# Patient Record
Sex: FEMALE | Race: WHITE | Employment: UNEMPLOYED | URBAN - METROPOLITAN AREA
[De-identification: names, ages, dates, MRNs, and addresses within clinical notes are randomized per-mention and may not be internally consistent; named-entity substitution may affect disease eponyms.]

---

## 2017-03-14 ENCOUNTER — HOSPITAL ENCOUNTER (OUTPATIENT)
Age: 42
Discharge: HOME | End: 2017-03-14
Payer: COMMERCIAL

## 2017-03-14 VITALS
OXYGEN SATURATION: 100 % | HEART RATE: 72 BPM | HEIGHT: 68 IN | RESPIRATION RATE: 16 BRPM | DIASTOLIC BLOOD PRESSURE: 75 MMHG | SYSTOLIC BLOOD PRESSURE: 144 MMHG | TEMPERATURE: 98 F

## 2017-03-14 DIAGNOSIS — L02.91 ABSCESS: Primary | ICD-10-CM

## 2017-03-14 PROCEDURE — 87070 CULTURE OTHR SPECIMN AEROBIC: CPT | Performed by: PHYSICIAN ASSISTANT

## 2017-03-14 PROCEDURE — 99203 OFFICE O/P NEW LOW 30 MIN: CPT

## 2017-03-14 PROCEDURE — 87205 SMEAR GRAM STAIN: CPT | Performed by: PHYSICIAN ASSISTANT

## 2017-03-14 PROCEDURE — 99204 OFFICE O/P NEW MOD 45 MIN: CPT

## 2017-03-14 RX ORDER — SULFAMETHOXAZOLE AND TRIMETHOPRIM 800; 160 MG/1; MG/1
1 TABLET ORAL 2 TIMES DAILY
Qty: 14 TABLET | Refills: 0 | Status: SHIPPED | OUTPATIENT
Start: 2017-03-14 | End: 2017-03-21

## 2017-03-14 RX ORDER — DOXEPIN HYDROCHLORIDE 50 MG/1
1 CAPSULE ORAL DAILY
COMMUNITY
End: 2021-04-05

## 2017-03-14 NOTE — ED PROVIDER NOTES
Patient Seen in: [facility] In [place]    History   Patient presents with:  Abscess (integumentary)    Stated Complaint: abscess on hip    HPI    Kary Hawkins is a 59-year-old female who presents today for evaluation of an abscess on her hip.   Liane Martin Exam   Constitutional: She is oriented to person, place, and time. She appears well-developed and well-nourished. HENT:   Head: Normocephalic and atraumatic.    Right Ear: External ear normal.   Left Ear: External ear normal.   Nose: Nose normal.   Mouth/ Medication List as of 3/14/2017  5:26 PM    START taking these medications    Sulfamethoxazole-TMP -160 MG Oral Tab per tablet  Take 1 tablet by mouth 2 (two) times daily. , Normal, Disp-14 tablet, R-0

## 2017-03-14 NOTE — ED INITIAL ASSESSMENT (HPI)
Patient presents to [facility] with cc of abscess to left hip x 4 days. No fever or chills. Popped it herself on Saturday and draining purulent drainage.

## 2017-03-28 ENCOUNTER — HOSPITAL ENCOUNTER (OUTPATIENT)
Age: 42
Discharge: HOME | End: 2017-03-28
Payer: COMMERCIAL

## 2017-03-28 ENCOUNTER — APPOINTMENT (OUTPATIENT)
Dept: GENERAL RADIOLOGY | Age: 42
End: 2017-03-28
Attending: PHYSICIAN ASSISTANT
Payer: COMMERCIAL

## 2017-03-28 VITALS
SYSTOLIC BLOOD PRESSURE: 122 MMHG | HEART RATE: 66 BPM | TEMPERATURE: 99 F | RESPIRATION RATE: 20 BRPM | OXYGEN SATURATION: 99 % | DIASTOLIC BLOOD PRESSURE: 53 MMHG

## 2017-03-28 DIAGNOSIS — M41.9 SCOLIOSIS OF THORACIC SPINE, UNSPECIFIED SCOLIOSIS TYPE: ICD-10-CM

## 2017-03-28 DIAGNOSIS — M50.322 OTHER CERVICAL DISC DEGENERATION AT C5-C6 LEVEL: Primary | ICD-10-CM

## 2017-03-28 DIAGNOSIS — S13.9XXA ACUTE CERVICAL SPRAIN, INITIAL ENCOUNTER: ICD-10-CM

## 2017-03-28 LAB — POCT URINE PREGNANCY: NEGATIVE

## 2017-03-28 PROCEDURE — 72050 X-RAY EXAM NECK SPINE 4/5VWS: CPT

## 2017-03-28 PROCEDURE — 99214 OFFICE O/P EST MOD 30 MIN: CPT

## 2017-03-28 PROCEDURE — 81025 URINE PREGNANCY TEST: CPT | Performed by: PHYSICIAN ASSISTANT

## 2017-03-28 PROCEDURE — 96372 THER/PROPH/DIAG INJ SC/IM: CPT

## 2017-03-28 RX ORDER — KETOROLAC TROMETHAMINE 30 MG/ML
60 INJECTION, SOLUTION INTRAMUSCULAR; INTRAVENOUS ONCE
Status: COMPLETED | OUTPATIENT
Start: 2017-03-28 | End: 2017-03-28

## 2017-03-28 RX ORDER — CYCLOBENZAPRINE HCL 10 MG
10 TABLET ORAL NIGHTLY
Qty: 20 TABLET | Refills: 0 | Status: SHIPPED | OUTPATIENT
Start: 2017-03-28 | End: 2017-04-17
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
## 2017-04-26 ENCOUNTER — HOSPITAL ENCOUNTER (OUTPATIENT)
Dept: GENERAL RADIOLOGY | Facility: HOSPITAL | Age: 42
Discharge: HOME | End: 2017-04-26
Attending: FAMILY MEDICINE
Payer: COMMERCIAL

## 2017-04-26 ENCOUNTER — LAB ENCOUNTER (OUTPATIENT)
Dept: LAB | Facility: HOSPITAL | Age: 42
End: 2017-04-26
Attending: FAMILY MEDICINE
Payer: COMMERCIAL

## 2017-04-26 DIAGNOSIS — R10.11 RIGHT UPPER QUADRANT PAIN: ICD-10-CM

## 2017-04-26 DIAGNOSIS — R07.81 PLEURITIC PAIN: ICD-10-CM

## 2017-04-26 DIAGNOSIS — R07.1 CHEST PAIN MADE WORSE BY BREATHING: ICD-10-CM

## 2017-04-26 DIAGNOSIS — R10.11 RIGHT UPPER QUADRANT PAIN: Primary | ICD-10-CM

## 2017-04-26 DIAGNOSIS — R07.9 CHEST PAIN, UNSPECIFIED TYPE: ICD-10-CM

## 2017-04-26 DIAGNOSIS — R07.9 CHEST PAIN: ICD-10-CM

## 2017-04-26 PROCEDURE — 84484 ASSAY OF TROPONIN QUANT: CPT

## 2017-04-26 PROCEDURE — 85378 FIBRIN DEGRADE SEMIQUANT: CPT

## 2017-04-26 PROCEDURE — 36415 COLL VENOUS BLD VENIPUNCTURE: CPT

## 2017-04-26 PROCEDURE — 71020 XR CHEST PA + LAT CHEST (CPT=71020): CPT

## 2019-09-26 ENCOUNTER — APPOINTMENT (OUTPATIENT)
Dept: GENERAL RADIOLOGY | Facility: HOSPITAL | Age: 44
End: 2019-09-26
Attending: PHYSICIAN ASSISTANT
Payer: COMMERCIAL

## 2019-09-26 ENCOUNTER — HOSPITAL ENCOUNTER (EMERGENCY)
Facility: HOSPITAL | Age: 44
Discharge: HOME | End: 2019-09-26
Attending: EMERGENCY MEDICINE
Payer: COMMERCIAL

## 2019-09-26 VITALS
SYSTOLIC BLOOD PRESSURE: 122 MMHG | TEMPERATURE: 99 F | HEART RATE: 71 BPM | RESPIRATION RATE: 17 BRPM | OXYGEN SATURATION: 100 % | DIASTOLIC BLOOD PRESSURE: 59 MMHG

## 2019-09-26 DIAGNOSIS — R07.89 PRESSURE IN CHEST: Primary | ICD-10-CM

## 2019-09-26 LAB
ALBUMIN SERPL-MCNC: 4.1 G/DL (ref 3.4–5)
ALBUMIN/GLOB SERPL: 1 {RATIO} (ref 1–2)
ALP LIVER SERPL-CCNC: 87 U/L (ref 37–98)
ALT SERPL-CCNC: 18 U/L (ref 13–56)
ANION GAP SERPL CALC-SCNC: 5 MMOL/L (ref 0–18)
AST SERPL-CCNC: 10 U/L (ref 15–37)
BASOPHILS # BLD AUTO: 0.03 X10(3) UL (ref 0–0.2)
BASOPHILS NFR BLD AUTO: 0.2 %
BILIRUB SERPL-MCNC: 0.9 MG/DL (ref 0.1–2)
BUN BLD-MCNC: 12 MG/DL (ref 7–18)
BUN/CREAT SERPL: 15.2 (ref 10–20)
CALCIUM BLD-MCNC: 9.1 MG/DL (ref 8.5–10.1)
CHLORIDE SERPL-SCNC: 103 MMOL/L (ref 98–112)
CO2 SERPL-SCNC: 27 MMOL/L (ref 21–32)
CREAT BLD-MCNC: 0.79 MG/DL (ref 0.55–1.02)
DEPRECATED RDW RBC AUTO: 44.8 FL (ref 35.1–46.3)
EOSINOPHIL # BLD AUTO: 0.06 X10(3) UL (ref 0–0.7)
EOSINOPHIL NFR BLD AUTO: 0.4 %
ERYTHROCYTE [DISTWIDTH] IN BLOOD BY AUTOMATED COUNT: 14 % (ref 11–15)
GLOBULIN PLAS-MCNC: 4.2 G/DL (ref 2.8–4.4)
GLUCOSE BLD-MCNC: 101 MG/DL (ref 70–99)
HCT VFR BLD AUTO: 44.2 % (ref 35–48)
HGB BLD-MCNC: 14.5 G/DL (ref 12–16)
IMM GRANULOCYTES # BLD AUTO: 0.05 X10(3) UL (ref 0–1)
IMM GRANULOCYTES NFR BLD: 0.3 %
LIPASE SERPL-CCNC: 124 U/L (ref 73–393)
LYMPHOCYTES # BLD AUTO: 2.12 X10(3) UL (ref 1–4)
LYMPHOCYTES NFR BLD AUTO: 14.6 %
M PROTEIN MFR SERPL ELPH: 8.3 G/DL (ref 6.4–8.2)
MCH RBC QN AUTO: 28.5 PG (ref 26–34)
MCHC RBC AUTO-ENTMCNC: 32.8 G/DL (ref 31–37)
MCV RBC AUTO: 87 FL (ref 80–100)
MONOCYTES # BLD AUTO: 1.18 X10(3) UL (ref 0.1–1)
MONOCYTES NFR BLD AUTO: 8.1 %
NEUTROPHILS # BLD AUTO: 11.09 X10 (3) UL (ref 1.5–7.7)
NEUTROPHILS # BLD AUTO: 11.09 X10(3) UL (ref 1.5–7.7)
NEUTROPHILS NFR BLD AUTO: 76.4 %
OSMOLALITY SERPL CALC.SUM OF ELEC: 280 MOSM/KG (ref 275–295)
PLATELET # BLD AUTO: 230 10(3)UL (ref 150–450)
POTASSIUM SERPL-SCNC: 3.5 MMOL/L (ref 3.5–5.1)
RBC # BLD AUTO: 5.08 X10(6)UL (ref 3.8–5.3)
SODIUM SERPL-SCNC: 135 MMOL/L (ref 136–145)
TROPONIN I SERPL-MCNC: <0.045 NG/ML (ref ?–0.04)
WBC # BLD AUTO: 14.5 X10(3) UL (ref 4–11)

## 2019-09-26 PROCEDURE — 93010 ELECTROCARDIOGRAM REPORT: CPT

## 2019-09-26 PROCEDURE — 99285 EMERGENCY DEPT VISIT HI MDM: CPT

## 2019-09-26 PROCEDURE — 71045 X-RAY EXAM CHEST 1 VIEW: CPT | Performed by: PHYSICIAN ASSISTANT

## 2019-09-26 PROCEDURE — 93005 ELECTROCARDIOGRAM TRACING: CPT

## 2019-09-26 PROCEDURE — 85025 COMPLETE CBC W/AUTO DIFF WBC: CPT | Performed by: PHYSICIAN ASSISTANT

## 2019-09-26 PROCEDURE — 84484 ASSAY OF TROPONIN QUANT: CPT | Performed by: PHYSICIAN ASSISTANT

## 2019-09-26 PROCEDURE — 96374 THER/PROPH/DIAG INJ IV PUSH: CPT

## 2019-09-26 PROCEDURE — 80053 COMPREHEN METABOLIC PANEL: CPT | Performed by: PHYSICIAN ASSISTANT

## 2019-09-26 PROCEDURE — 83690 ASSAY OF LIPASE: CPT | Performed by: PHYSICIAN ASSISTANT

## 2019-09-26 RX ORDER — SUCRALFATE ORAL 1 G/10ML
1 SUSPENSION ORAL
Status: DISCONTINUED | OUTPATIENT
Start: 2019-09-26 | End: 2019-09-26

## 2019-09-26 RX ORDER — OMEPRAZOLE 20 MG/1
20 CAPSULE, DELAYED RELEASE ORAL DAILY
Qty: 30 CAPSULE | Refills: 0 | Status: SHIPPED | OUTPATIENT
Start: 2019-09-26 | End: 2019-10-26

## 2019-09-26 RX ORDER — SUCRALFATE ORAL 1 G/10ML
1 SUSPENSION ORAL ONCE
Status: COMPLETED | OUTPATIENT
Start: 2019-09-26 | End: 2019-09-26

## 2019-09-26 RX ORDER — KETOROLAC TROMETHAMINE 30 MG/ML
15 INJECTION, SOLUTION INTRAMUSCULAR; INTRAVENOUS ONCE
Status: COMPLETED | OUTPATIENT
Start: 2019-09-26 | End: 2019-09-26

## 2019-09-27 LAB
ATRIAL RATE: 77 BPM
P AXIS: 57 DEGREES
P-R INTERVAL: 172 MS
Q-T INTERVAL: 366 MS
QRS DURATION: 106 MS
QTC CALCULATION (BEZET): 414 MS
R AXIS: 85 DEGREES
T AXIS: -1 DEGREES
VENTRICULAR RATE: 77 BPM

## 2019-09-27 NOTE — ED PROVIDER NOTES
Patient Seen in: [facility] Emergency Department      History   Patient presents with:  Chest Pain Angina (cardiovascular)    Stated Complaint: Chest pain    HPI  CHIEF COMPLAINT: Chest pressure    HISTORY OF PRESENT ILLNESS: Patient is a 44-year-ol noncontributory to the presenting problem, except as indicated as above.       Past Medical History:   Diagnosis Date   • Asthma               Past Surgical History:   Procedure Laterality Date   •       x5   • REMOVAL GALLBLADDER                      S (*)     All other components within normal limits   CBC W/ DIFFERENTIAL - Abnormal; Notable for the following components:    WBC 14.5 (*)     Neutrophil Absolute Prelim 11.09 (*)     Neutrophil Absolute 11.09 (*)     Monocyte Absolute 1.18 (*)     All othe performed at this time. MDM     2215: Patient is comfortable going home , patient had negative cardiac work-up, patient be discharged home on Prilosec close follow-up with her primary care physician.   Discussed with her that she may need a outpatient st 0                            EpicACT:[email or web address],RunParamsURLEncoded:[id number]

## 2019-09-27 NOTE — ED INITIAL ASSESSMENT (HPI)
Per patient, chest pain since last night. Believes she has a pulled muscle from a fall one week ago. AAO X 4. Sharp substernal pain that radiates to her back. Also complains of pressure like headache for 2 weeks.

## 2020-10-01 ENCOUNTER — HOSPITAL ENCOUNTER (INPATIENT)
Facility: HOSPITAL | Age: 45
LOS: 1 days | Discharge: HOME | DRG: 309 | End: 2020-10-02
Attending: STUDENT IN AN ORGANIZED HEALTH CARE EDUCATION/TRAINING PROGRAM | Admitting: HOSPITALIST
Payer: COMMERCIAL

## 2020-10-01 DIAGNOSIS — I48.91 ATRIAL FIBRILLATION WITH RAPID VENTRICULAR RESPONSE (HCC): Primary | ICD-10-CM

## 2020-10-01 DIAGNOSIS — R07.89 CHEST PRESSURE: ICD-10-CM

## 2020-10-02 ENCOUNTER — APPOINTMENT (OUTPATIENT)
Dept: CV DIAGNOSTICS | Facility: HOSPITAL | Age: 45
DRG: 309 | End: 2020-10-02
Attending: INTERNAL MEDICINE
Payer: COMMERCIAL

## 2020-10-02 VITALS
RESPIRATION RATE: 13 BRPM | HEIGHT: 68 IN | BODY MASS INDEX: 44.41 KG/M2 | WEIGHT: 293 LBS | OXYGEN SATURATION: 100 % | HEART RATE: 57 BPM | SYSTOLIC BLOOD PRESSURE: 111 MMHG | TEMPERATURE: 99 F | DIASTOLIC BLOOD PRESSURE: 62 MMHG

## 2020-10-02 PROBLEM — I48.91 ATRIAL FIBRILLATION WITH RAPID VENTRICULAR RESPONSE (HCC): Status: ACTIVE | Noted: 2020-10-02

## 2020-10-02 PROBLEM — R07.89 CHEST PRESSURE: Status: ACTIVE | Noted: 2020-10-02

## 2020-10-02 LAB
ANION GAP SERPL CALC-SCNC: 7 MMOL/L
BUN SERPL-MCNC: 8 MG/DL
BUN/CREAT SERPL: 13.1
CALCIUM SERPL-MCNC: 9.2 MG/DL
CHLORIDE SERPL-SCNC: 105 MMOL/L
CHOLEST SERPL-MCNC: 163 MG/DL
CHOLEST/HDLC SERPL: 50 {RATIO}
CO2 SERPL-SCNC: 25 MMOL/L
CREAT SERPL-MCNC: 0.61 MG/DL
GLUCOSE SERPL-MCNC: 107 MG/DL
HCT VFR BLD CALC: 42.5 %
HGB BLD-MCNC: 13.9 G/DL
LDLC SERPL CALC-MCNC: 92 MG/DL
MAGNESIUM SERPL-MCNC: 2.3 MG/DL
NONHDLC SERPL-MCNC: 113 MG/DL
POTASSIUM SERPL-SCNC: 3.9 MMOL/L
RBC # BLD: 4.88 10*6/UL
SODIUM SERPL-SCNC: 137 MMOL/L
TRIGL SERPL-MCNC: 103 MG/DL
VLDLC SERPL CALC-MCNC: 21 MG/DL
WBC # BLD: 11.3 K/MCL

## 2020-10-02 PROCEDURE — 93306 TTE W/DOPPLER COMPLETE: CPT | Performed by: INTERNAL MEDICINE

## 2020-10-02 PROCEDURE — 92960 CARDIOVERSION ELECTRIC EXT: CPT | Performed by: INTERNAL MEDICINE

## 2020-10-02 PROCEDURE — 99223 1ST HOSP IP/OBS HIGH 75: CPT | Performed by: HOSPITALIST

## 2020-10-02 PROCEDURE — 5A2204Z RESTORATION OF CARDIAC RHYTHM, SINGLE: ICD-10-PCS | Performed by: INTERNAL MEDICINE

## 2020-10-02 PROCEDURE — 99255 IP/OBS CONSLTJ NEW/EST HI 80: CPT | Performed by: INTERNAL MEDICINE

## 2020-10-02 RX ORDER — SODIUM CHLORIDE 9 MG/ML
INJECTION, SOLUTION INTRAVENOUS CONTINUOUS
Status: DISCONTINUED | OUTPATIENT
Start: 2020-10-02 | End: 2020-10-02

## 2020-10-02 RX ORDER — ONDANSETRON 2 MG/ML
4 INJECTION INTRAMUSCULAR; INTRAVENOUS EVERY 6 HOURS PRN
Status: DISCONTINUED | OUTPATIENT
Start: 2020-10-02 | End: 2020-10-02

## 2020-10-02 RX ORDER — DILTIAZEM HYDROCHLORIDE 240 MG/1
240 CAPSULE, COATED, EXTENDED RELEASE ORAL DAILY
Qty: 30 CAPSULE | Refills: 1 | Status: SHIPPED | OUTPATIENT
Start: 2020-10-03

## 2020-10-02 RX ORDER — HEPARIN SODIUM 5000 [USP'U]/ML
5000 INJECTION INTRAVENOUS; SUBCUTANEOUS ONCE
Status: COMPLETED | OUTPATIENT
Start: 2020-10-02 | End: 2020-10-02

## 2020-10-02 RX ORDER — HEPARIN SODIUM AND DEXTROSE 10000; 5 [USP'U]/100ML; G/100ML
1000 INJECTION INTRAVENOUS ONCE
Status: COMPLETED | OUTPATIENT
Start: 2020-10-02 | End: 2020-10-02

## 2020-10-02 RX ORDER — ACETAMINOPHEN 325 MG/1
650 TABLET ORAL EVERY 6 HOURS PRN
Status: DISCONTINUED | OUTPATIENT
Start: 2020-10-02 | End: 2020-10-02

## 2020-10-02 RX ORDER — DILTIAZEM HYDROCHLORIDE 240 MG/1
240 CAPSULE, COATED, EXTENDED RELEASE ORAL DAILY
Status: DISCONTINUED | OUTPATIENT
Start: 2020-10-02 | End: 2020-10-02

## 2020-10-02 RX ORDER — HEPARIN SODIUM 5000 [USP'U]/ML
3000 INJECTION INTRAVENOUS; SUBCUTANEOUS ONCE
Status: COMPLETED | OUTPATIENT
Start: 2020-10-02 | End: 2020-10-02

## 2020-10-02 RX ORDER — FAMOTIDINE 20 MG/1
20 TABLET ORAL 2 TIMES DAILY
Status: DISCONTINUED | OUTPATIENT
Start: 2020-10-02 | End: 2020-10-02

## 2020-10-02 RX ORDER — HEPARIN SODIUM AND DEXTROSE 10000; 5 [USP'U]/100ML; G/100ML
INJECTION INTRAVENOUS CONTINUOUS
Status: DISCONTINUED | OUTPATIENT
Start: 2020-10-02 | End: 2020-10-02

## 2020-10-02 NOTE — PAYOR COMM NOTE
--------------  ADMISSION REVIEW     Payor: JUAN PINEDO  Subscriber #:  [id number]  Authorization Number: [id number]    Admit date: 10/2/20  Admit time: 0210       Patient Seen in: [facility] Emergency Department    History   Patient presents with:  Pastora Galan Notable for the following components:       Result Value    Glucose 100 (*)     Sodium 135 (*)     Total Protein 8.4 (*)     Globulin  5.0 (*)     A/G Ratio 0.7 (*)     All other components within normal limits   CBC W/ DIFFERENTIAL - Abnormal; Notable for 126   Temp 98.5 °F (36.9 °C) (Temporal)   Resp 17   Ht 172.7 cm (5' 8\")   Wt 300 lb (136.1 kg)   LMP 09/17/2020   SpO2 98%   BMI 45.61 kg/m²   General: Alert and oriented x 3. HEENT: Normocephalic atraumatic. Moist mucous membranes. EOM-I. PERRLA.  Anicte 5,000 Units Intravenous Dariana Perez RN      heparin (PORCINE) drip 62390woxkr/250mL infusion CONTINUOUS     Date Action Dose Route User    10/2/2020 0793 New Bag 1,200 Units/hr Intravenous Sera Duran RN      diltiazem 100mg/100ml in NaCl (

## 2020-10-02 NOTE — ED INITIAL ASSESSMENT (HPI)
Patient sts felt she had a moment or rage and rush where she felt palpitations and feels it hasn't gone away since earlier this morning.  Pain sternal.

## 2020-10-02 NOTE — PROCEDURES
Cardioversion Procedure Note:    PROCEDURE:   1) External direct current cardioversion  2) Administration of IV conscious sedation    DATE OF PROCEDURE: 96/58/35    COMPLICATIONS: None     Physician: Tamiko Hayes is a 39year old

## 2020-10-02 NOTE — CONSULTS
[facility]  Electrophysiology Initial Consult Note      Son Szymanski Patient Status:  Inpatient    1975 MRN [id number]   [facility] 8NE-A Attending Richard Jeffrey MD   Hosp Day # 0 PCP Shreya Whitfield MD     [facility] 10 mg infusion, 10 mg, Intravenous, Q1H PRN  •  diltiazem 100mg/100ml in NaCl (CARDIZEM) 1 mg/mL premix/add-vantage, 2.5-20 mg/hr, Intravenous, Continuous  •  acetaminophen (TYLENOL) tab 650 mg, 650 mg, Oral, Q6H PRN  •  ondansetron HCl (ZOF Lab 10/02/20  0002 10/02/20  0635   INR 1.07  --    PTT 29.9 33.1             Data:    Telemetry: personally reviewed; AF with RVR    ECG: personally reviewed; AF with RVR      Assessment/Plan:  Deandre Ayala is a 39year old woman with no past cardi

## 2020-10-02 NOTE — PLAN OF CARE
Assumed patient care at 0730 this AM. Patient A&O x4. SPO2 maintained on RA, no c/o SOB. Afib on tele- maintained on cardizem and heparin gtt per orders. Pt reports palpitations (lessened from admit). Plan for cardioversion today. Pt is continent of B&B.  U

## 2020-10-02 NOTE — PROGRESS NOTES
Explained discharge instructions including medications and follow ups to the patient, verbalize understanding. Reminded pt to follow up about getting sleep study done. Printed prescriptions and xarelto savings card provided. Belongings packed by pt.  Ravindra r

## 2020-10-02 NOTE — PLAN OF CARE
Rcv'd A/O/3  Denies pain or discomfort  On tele with A-fib RVR  Replaced leads with disposable leads   Cardizem and heparin drip  Lungs sounds diminished bilateral  States \"feels like this room is dirty\"  Germaphobic.  Insisted on re cleaning all surfaces replacement therapy as ordered  Outcome: Progressing

## 2020-10-02 NOTE — H&P
[facility]  History and Physical     River Hayes Patient Status:  Emergency    1975 MRN [id number]   Location [address] [facility] Attending Mikayla Cárdenas MD   Hosp Day # 0 PCP Judy Mccarthy MD     Chief Complaint: comprehensive 14 point review of systems was completed. Pertinent positives and negatives noted in the HPI.     Physical Exam:    /80   Pulse (!) 126   Temp 98.5 °F (36.9 °C) (Temporal)   Resp 17   Ht 172.7 cm (5' 8\")   Wt 300 lb (136.1 kg)   LMP Pepcid  3. Hyponatremia  4. Asthma  5.  Leukocytosis likely reactive    Quality:  · DVT Prophylaxis: heparin  · CODE status: Full  · Crawford: No    Plan of care discussed with patient, ED physician    Shankar Rock MD  10/2/2020

## 2020-10-02 NOTE — ED PROVIDER NOTES
Patient Seen in: [facility] Emergency Department      History   Patient presents with:  Chest Pain Angina    Stated Complaint: cp    HPI    Patient is a 22-year-old female who presents emergency department reporting intermittent palpitations since t Cardiovascular: Irregularly irregular, normal heart sounds and intact distal pulses. Exam reveals no gallop and no friction rub. No murmur heard. Pulmonary/Chest: Effort normal. No respiratory distress. no wheezes. no rales. no tenderness.    Abdomi when EKG compared to prior. No other concerning change. MDM      Extensive differential diagnosis was considered including underlying cardiac, pulmonary, thromboembolic, gastrointestinal, vascular, infectious and other etiologies.     EKG de

## 2020-10-02 NOTE — PROGRESS NOTES
S/P cardioversion. Brevital 80 mg given for sedation, patient cardioverted  To SR with one syncronize shock at 200J. Hemodynamics remain stable. EKG completed. Will continue to monitor. Pt is alert and oriented x 4, moving all limbs.  Report given by

## 2020-10-05 ENCOUNTER — TELEPHONE (OUTPATIENT)
Dept: CARDIOLOGY | Age: 45
End: 2020-10-05

## 2020-10-29 ENCOUNTER — OFFICE VISIT (OUTPATIENT)
Dept: CARDIOLOGY | Age: 45
End: 2020-10-29

## 2020-10-29 VITALS — DIASTOLIC BLOOD PRESSURE: 80 MMHG | WEIGHT: 293 LBS | SYSTOLIC BLOOD PRESSURE: 120 MMHG

## 2020-10-29 DIAGNOSIS — I48.0 PAROXYSMAL ATRIAL FIBRILLATION (CMD): Primary | ICD-10-CM

## 2020-10-29 PROCEDURE — 93000 ELECTROCARDIOGRAM COMPLETE: CPT | Performed by: INTERNAL MEDICINE

## 2020-10-29 PROCEDURE — 99204 OFFICE O/P NEW MOD 45 MIN: CPT | Performed by: INTERNAL MEDICINE

## 2020-10-29 RX ORDER — DOXEPIN HYDROCHLORIDE 50 MG/1
1 CAPSULE ORAL DAILY
COMMUNITY

## 2020-10-29 RX ORDER — DILTIAZEM HYDROCHLORIDE 240 MG/1
240 CAPSULE, COATED, EXTENDED RELEASE ORAL DAILY
COMMUNITY
Start: 2020-10-03 | End: 2021-05-03 | Stop reason: SDUPTHER

## 2020-10-29 SDOH — HEALTH STABILITY: MENTAL HEALTH: HOW OFTEN DO YOU HAVE A DRINK CONTAINING ALCOHOL?: NEVER

## 2020-10-29 SDOH — HEALTH STABILITY: PHYSICAL HEALTH: ON AVERAGE, HOW MANY DAYS PER WEEK DO YOU ENGAGE IN MODERATE TO STRENUOUS EXERCISE (LIKE A BRISK WALK)?: 0 DAYS

## 2020-10-29 ASSESSMENT — ENCOUNTER SYMPTOMS
CHILLS: 0
COUGH: 0
HEMOPTYSIS: 0
ALLERGIC/IMMUNOLOGIC COMMENTS: NO NEW FOOD ALLERGIES
HEMATOCHEZIA: 0
SUSPICIOUS LESIONS: 0
FEVER: 0
WEIGHT LOSS: 0
BRUISES/BLEEDS EASILY: 0
WEIGHT GAIN: 0

## 2020-10-29 ASSESSMENT — PATIENT HEALTH QUESTIONNAIRE - PHQ9
1. LITTLE INTEREST OR PLEASURE IN DOING THINGS: NOT AT ALL
CLINICAL INTERPRETATION OF PHQ2 SCORE: NO FURTHER SCREENING NEEDED
2. FEELING DOWN, DEPRESSED OR HOPELESS: NOT AT ALL
CLINICAL INTERPRETATION OF PHQ9 SCORE: NO FURTHER SCREENING NEEDED
SUM OF ALL RESPONSES TO PHQ9 QUESTIONS 1 AND 2: 0
SUM OF ALL RESPONSES TO PHQ9 QUESTIONS 1 AND 2: 0

## 2020-12-02 RX ORDER — DILTIAZEM HYDROCHLORIDE 240 MG/1
CAPSULE, EXTENDED RELEASE ORAL
Qty: 90 CAPSULE | Refills: 0 | Status: SHIPPED | OUTPATIENT
Start: 2020-12-02 | End: 2021-03-11 | Stop reason: SDUPTHER

## 2020-12-08 ENCOUNTER — TELEPHONE (OUTPATIENT)
Dept: CARDIOLOGY | Age: 45
End: 2020-12-08

## 2020-12-14 DIAGNOSIS — I48.0 PAROXYSMAL ATRIAL FIBRILLATION (CMD): Primary | ICD-10-CM

## 2020-12-17 ENCOUNTER — APPOINTMENT (OUTPATIENT)
Dept: CARDIOLOGY | Age: 45
End: 2020-12-17

## 2021-01-19 ENCOUNTER — TELEPHONE (OUTPATIENT)
Dept: CARDIOLOGY | Age: 46
End: 2021-01-19

## 2021-03-05 RX ORDER — DILTIAZEM HYDROCHLORIDE 240 MG/1
240 CAPSULE, EXTENDED RELEASE ORAL DAILY
Qty: 90 CAPSULE | Refills: 0 | Status: CANCELLED | OUTPATIENT
Start: 2021-03-05

## 2021-03-10 ENCOUNTER — TELEPHONE (OUTPATIENT)
Dept: CARDIOLOGY | Age: 46
End: 2021-03-10

## 2021-03-11 RX ORDER — DILTIAZEM HYDROCHLORIDE 240 MG/1
240 CAPSULE, EXTENDED RELEASE ORAL DAILY
Qty: 60 CAPSULE | Refills: 0 | Status: SHIPPED | OUTPATIENT
Start: 2021-03-11 | End: 2021-03-12 | Stop reason: CLARIF

## 2021-03-12 RX ORDER — AMOXICILLIN 875 MG/1
TABLET, COATED ORAL
COMMUNITY
Start: 2021-01-08 | End: 2021-03-26 | Stop reason: CLARIF

## 2021-03-26 ENCOUNTER — OFFICE VISIT (OUTPATIENT)
Dept: CARDIOLOGY | Age: 46
End: 2021-03-26

## 2021-03-26 VITALS
HEIGHT: 68 IN | SYSTOLIC BLOOD PRESSURE: 134 MMHG | WEIGHT: 293 LBS | BODY MASS INDEX: 44.41 KG/M2 | HEART RATE: 76 BPM | OXYGEN SATURATION: 96 % | DIASTOLIC BLOOD PRESSURE: 70 MMHG

## 2021-03-26 DIAGNOSIS — I48.0 PAROXYSMAL ATRIAL FIBRILLATION (CMD): Primary | ICD-10-CM

## 2021-03-26 PROCEDURE — 93000 ELECTROCARDIOGRAM COMPLETE: CPT | Performed by: INTERNAL MEDICINE

## 2021-03-26 PROCEDURE — 99244 OFF/OP CNSLTJ NEW/EST MOD 40: CPT | Performed by: INTERNAL MEDICINE

## 2021-03-26 SDOH — HEALTH STABILITY: MENTAL HEALTH: HOW OFTEN DO YOU HAVE A DRINK CONTAINING ALCOHOL?: NEVER

## 2021-03-26 ASSESSMENT — PATIENT HEALTH QUESTIONNAIRE - PHQ9
SUM OF ALL RESPONSES TO PHQ9 QUESTIONS 1 AND 2: 0
CLINICAL INTERPRETATION OF PHQ9 SCORE: NO FURTHER SCREENING NEEDED
CLINICAL INTERPRETATION OF PHQ2 SCORE: NO FURTHER SCREENING NEEDED
1. LITTLE INTEREST OR PLEASURE IN DOING THINGS: NOT AT ALL
SUM OF ALL RESPONSES TO PHQ9 QUESTIONS 1 AND 2: 0
2. FEELING DOWN, DEPRESSED OR HOPELESS: NOT AT ALL

## 2021-04-05 ENCOUNTER — APPOINTMENT (OUTPATIENT)
Dept: GENERAL RADIOLOGY | Facility: HOSPITAL | Age: 46
End: 2021-04-05
Attending: EMERGENCY MEDICINE
Payer: COMMERCIAL

## 2021-04-05 ENCOUNTER — APPOINTMENT (OUTPATIENT)
Dept: CT IMAGING | Facility: HOSPITAL | Age: 46
End: 2021-04-05
Attending: EMERGENCY MEDICINE
Payer: COMMERCIAL

## 2021-04-05 ENCOUNTER — HOSPITAL ENCOUNTER (EMERGENCY)
Facility: HOSPITAL | Age: 46
Discharge: HOME | End: 2021-04-05
Attending: EMERGENCY MEDICINE
Payer: COMMERCIAL

## 2021-04-05 VITALS
RESPIRATION RATE: 16 BRPM | BODY MASS INDEX: 44.41 KG/M2 | TEMPERATURE: 100 F | OXYGEN SATURATION: 96 % | SYSTOLIC BLOOD PRESSURE: 128 MMHG | HEART RATE: 72 BPM | DIASTOLIC BLOOD PRESSURE: 63 MMHG | HEIGHT: 68 IN | WEIGHT: 293 LBS

## 2021-04-05 DIAGNOSIS — R07.89 CHEST PAIN, ATYPICAL: ICD-10-CM

## 2021-04-05 DIAGNOSIS — J18.9 COMMUNITY ACQUIRED PNEUMONIA, UNSPECIFIED LATERALITY: Primary | ICD-10-CM

## 2021-04-05 PROCEDURE — 80053 COMPREHEN METABOLIC PANEL: CPT | Performed by: EMERGENCY MEDICINE

## 2021-04-05 PROCEDURE — 99453 REM MNTR PHYSIOL PARAM SETUP: CPT

## 2021-04-05 PROCEDURE — 71260 CT THORAX DX C+: CPT | Performed by: EMERGENCY MEDICINE

## 2021-04-05 PROCEDURE — 85379 FIBRIN DEGRADATION QUANT: CPT | Performed by: EMERGENCY MEDICINE

## 2021-04-05 PROCEDURE — 99285 EMERGENCY DEPT VISIT HI MDM: CPT

## 2021-04-05 PROCEDURE — 36415 COLL VENOUS BLD VENIPUNCTURE: CPT

## 2021-04-05 PROCEDURE — 71045 X-RAY EXAM CHEST 1 VIEW: CPT | Performed by: EMERGENCY MEDICINE

## 2021-04-05 PROCEDURE — 81025 URINE PREGNANCY TEST: CPT

## 2021-04-05 PROCEDURE — 85025 COMPLETE CBC W/AUTO DIFF WBC: CPT | Performed by: EMERGENCY MEDICINE

## 2021-04-05 PROCEDURE — 0241U SARS-COV-2/FLU A AND B/RSV BY PCR (GENEXPERT): CPT | Performed by: EMERGENCY MEDICINE

## 2021-04-05 PROCEDURE — 84484 ASSAY OF TROPONIN QUANT: CPT | Performed by: EMERGENCY MEDICINE

## 2021-04-05 PROCEDURE — 93005 ELECTROCARDIOGRAM TRACING: CPT

## 2021-04-05 PROCEDURE — 93010 ELECTROCARDIOGRAM REPORT: CPT

## 2021-04-05 RX ORDER — DOXYCYCLINE HYCLATE 100 MG/1
100 CAPSULE ORAL 2 TIMES DAILY
Qty: 10 CAPSULE | Refills: 0 | Status: SHIPPED | OUTPATIENT
Start: 2021-04-05 | End: 2021-04-10

## 2021-04-05 RX ORDER — AMOXICILLIN 500 MG/1
1000 CAPSULE ORAL ONCE
Status: COMPLETED | OUTPATIENT
Start: 2021-04-05 | End: 2021-04-05

## 2021-04-05 RX ORDER — ACETAMINOPHEN 500 MG
1000 TABLET ORAL ONCE
Status: DISCONTINUED | OUTPATIENT
Start: 2021-04-05 | End: 2021-04-06

## 2021-04-05 RX ORDER — AMOXICILLIN 500 MG/1
1000 TABLET, FILM COATED ORAL 3 TIMES DAILY
Qty: 30 TABLET | Refills: 0 | Status: SHIPPED | OUTPATIENT
Start: 2021-04-05 | End: 2021-04-10

## 2021-04-05 RX ORDER — AMOXICILLIN 875 MG/1
875 TABLET, COATED ORAL 2 TIMES DAILY
COMMUNITY
Start: 2021-01-08

## 2021-04-05 NOTE — ED PROVIDER NOTES
Patient Seen in: [facility] Emergency Department      History   Patient presents with:  Chest Pain Angina    Stated Complaint:     HPI/Subjective:   HPI    80-year-old female with past medical history of atrial fibrillation, no longer anticoagulated Mouth/Throat:      Mouth: Mucous membranes are moist.   Eyes:      Extraocular Movements: Extraocular movements intact. Pupils: Pupils are equal, round, and reactive to light. Cardiovascular:      Rate and Rhythm: Normal rate and regular rhythm.    P infection due to SARS-CoV-2, influenza, and RSV can be similar. Test performed using the Xpert Xpress SARS-CoV-2/FLU/RSV assay on the [address] [place] [address], [address] [place] [address].    This test is being used under the Food and Drug Administration's 7:14 PM.  INDICATIONS:  chest pain, elevated d dimer  TECHNIQUE:  CT images were obtained with non-ionic intravenous contrast material. Dose reduction techniques were used.  Dose information is transmitted to the  [facility] Radiology) NRDR (Na appearance is suspicious for COVID-19 pneumonia. Please correlate clinically. 3. There is an ovoid, 3.6 cm nodular soft tissue density within the left upper quadrant of the abdomen which appears to be emanating from the region of the gastric fundus.   The diverticulum versus tumor. I asked that she either discuss this with her primary care doctor or follow-up with gastroenterology for further evaluation.                  Disposition and Plan     Clinical Impression:  Community acquired pneumonia, unspecifie

## 2021-05-03 RX ORDER — DILTIAZEM HYDROCHLORIDE 240 MG/1
240 CAPSULE, COATED, EXTENDED RELEASE ORAL DAILY
Qty: 90 CAPSULE | Refills: 2 | Status: SHIPPED | OUTPATIENT
Start: 2021-05-03

## 2022-07-27 NOTE — ED NOTES
ANTICOAGULATION MANAGEMENT     Fausto Farr 81 year old male is on warfarin with therapeutic INR result. (Goal INR 2.5-3.5)    Recent labs: (last 7 days)     07/27/22  0822   INR 2.5*       ASSESSMENT       Source(s): Chart Review and Patient/Caregiver Call       Warfarin doses taken: Less warfarin taken than planned which may be affecting INR, patient was mixed up on the days to take 5 mg dose and 7.5 mg dose    Diet: Increased greens/vitamin K in diet; ongoing change    New illness, injury, or hospitalization: No    Medication/supplement changes: None noted    Signs or symptoms of bleeding or clotting: No    Previous INR: Supratherapeutic    Additional findings: None       PLAN     Recommended plan for no diet, medication or health factor changes affecting INR     Dosing Instructions: Continue your current warfarin dose with next INR in 2 weeks       Summary  As of 7/27/2022    Full warfarin instructions:  5 mg every Sun, Wed; 7.5 mg all other days   Next INR check:  8/10/2022             Telephone call with Ralph who agrees to plan and repeated back plan correctly    Lab visit scheduled    Education provided: Please call back if any changes to your diet, medications or how you've been taking warfarin and Contact [phone]  with any changes, questions or concerns.     Plan made per [facility] anticoagulation protocol    Cece Rois RN  Anticoagulation Clinic  7/27/2022    _______________________________________________________________________     Anticoagulation Episode Summary     Current INR goal:  2.5-3.5   TTR:  77.5 % (1 y)   Target end date:  Indefinite   Send INR reminders to:  SHANNA DOWELL    Indications    S/P aortic valve replacement [Z95.2]  S/P mitral valve replacement [Z95.2]  Long term current use of anticoagulant therapy [Z79.01]  Persistent atrial fibrillation (H) [I48.19]           Comments:           Anticoagulation Care Providers     Provider Role Specialty Phone number    Jodi Flower  Patient is refusing to disclose her weight. MD Lucie Referring Internal Medicine - Pediatrics [phone]

## 2022-09-17 ENCOUNTER — HOSPITAL ENCOUNTER (OUTPATIENT)
Age: 47
Discharge: HOME | End: 2022-09-17

## 2022-09-17 VITALS
TEMPERATURE: 98 F | OXYGEN SATURATION: 97 % | RESPIRATION RATE: 16 BRPM | SYSTOLIC BLOOD PRESSURE: 147 MMHG | DIASTOLIC BLOOD PRESSURE: 91 MMHG | BODY MASS INDEX: 46 KG/M2 | HEART RATE: 64 BPM | HEIGHT: 68 IN

## 2022-09-17 DIAGNOSIS — R21 RASH: Primary | ICD-10-CM

## 2022-09-17 PROCEDURE — 99212 OFFICE O/P EST SF 10 MIN: CPT

## 2022-09-17 RX ORDER — DOXYCYCLINE HYCLATE 100 MG/1
CAPSULE ORAL 2 TIMES DAILY
COMMUNITY

## 2022-11-29 ENCOUNTER — HOSPITAL ENCOUNTER (OUTPATIENT)
Dept: NUCLEAR MEDICINE | Facility: HOSPITAL | Age: 47
Discharge: HOME | End: 2022-11-29
Attending: SURGERY
Payer: COMMERCIAL

## 2022-11-29 DIAGNOSIS — C50.512 MALIGNANT NEOPLASM OF LOWER-OUTER QUADRANT OF LEFT FEMALE BREAST (HCC): ICD-10-CM

## 2022-11-29 DIAGNOSIS — C77.3 SECONDARY MALIGNANT NEOPLASM OF BRACHIAL LYMPH NODE (HCC): ICD-10-CM

## 2022-11-29 LAB — GLUCOSE BLD-MCNC: 85 MG/DL (ref 70–99)

## 2022-11-29 PROCEDURE — 82962 GLUCOSE BLOOD TEST: CPT

## 2022-11-29 PROCEDURE — 78815 PET IMAGE W/CT SKULL-THIGH: CPT | Performed by: SURGERY

## 2023-01-03 ENCOUNTER — APPOINTMENT (OUTPATIENT)
Dept: GENERAL RADIOLOGY | Facility: HOSPITAL | Age: 48
End: 2023-01-03
Payer: COMMERCIAL

## 2023-01-03 ENCOUNTER — APPOINTMENT (OUTPATIENT)
Dept: ULTRASOUND IMAGING | Facility: HOSPITAL | Age: 48
End: 2023-01-03
Payer: COMMERCIAL

## 2023-01-03 ENCOUNTER — APPOINTMENT (OUTPATIENT)
Dept: CT IMAGING | Facility: HOSPITAL | Age: 48
End: 2023-01-03
Attending: EMERGENCY MEDICINE
Payer: COMMERCIAL

## 2023-01-03 ENCOUNTER — HOSPITAL ENCOUNTER (INPATIENT)
Facility: HOSPITAL | Age: 48
LOS: 3 days | Discharge: HOME | End: 2023-01-06
Attending: EMERGENCY MEDICINE | Admitting: HOSPITALIST
Payer: COMMERCIAL

## 2023-01-03 DIAGNOSIS — I82.621 ACUTE DEEP VEIN THROMBOSIS (DVT) OF BRACHIAL VEIN OF RIGHT UPPER EXTREMITY (HCC): ICD-10-CM

## 2023-01-03 DIAGNOSIS — I82.A11 ACUTE DEEP VEIN THROMBOSIS (DVT) OF AXILLARY VEIN OF RIGHT UPPER EXTREMITY (HCC): Primary | ICD-10-CM

## 2023-01-03 LAB
ANION GAP SERPL CALC-SCNC: 4 MMOL/L (ref 0–18)
APTT PPP: 23.3 SECONDS (ref 23.3–35.6)
ATRIAL RATE: 59 BPM
B-HCG UR QL: NEGATIVE
BASOPHILS # BLD AUTO: 0.02 X10(3) UL (ref 0–0.2)
BASOPHILS NFR BLD AUTO: 0.4 %
BUN BLD-MCNC: 6 MG/DL (ref 7–18)
BUN/CREAT SERPL: 8.7 (ref 10–20)
CALCIUM BLD-MCNC: 9.4 MG/DL (ref 8.5–10.1)
CHLORIDE SERPL-SCNC: 108 MMOL/L (ref 98–112)
CO2 SERPL-SCNC: 24 MMOL/L (ref 21–32)
CREAT BLD-MCNC: 0.69 MG/DL
DEPRECATED RDW RBC AUTO: 44.1 FL (ref 35.1–46.3)
EOSINOPHIL # BLD AUTO: 0.03 X10(3) UL (ref 0–0.7)
EOSINOPHIL NFR BLD AUTO: 0.7 %
ERYTHROCYTE [DISTWIDTH] IN BLOOD BY AUTOMATED COUNT: 13.8 % (ref 11–15)
GFR SERPLBLD BASED ON 1.73 SQ M-ARVRAT: 108 ML/MIN/1.73M2 (ref 60–?)
GLUCOSE BLD-MCNC: 107 MG/DL (ref 70–99)
HCT VFR BLD AUTO: 42.7 %
HGB BLD-MCNC: 14.1 G/DL
IMM GRANULOCYTES # BLD AUTO: 0.06 X10(3) UL (ref 0–1)
IMM GRANULOCYTES NFR BLD: 1.3 %
INR BLD: 1.07 (ref 0.85–1.16)
LYMPHOCYTES # BLD AUTO: 1.13 X10(3) UL (ref 1–4)
LYMPHOCYTES NFR BLD AUTO: 25.2 %
MCH RBC QN AUTO: 28.7 PG (ref 26–34)
MCHC RBC AUTO-ENTMCNC: 33 G/DL (ref 31–37)
MCV RBC AUTO: 86.8 FL
MONOCYTES # BLD AUTO: 0.29 X10(3) UL (ref 0.1–1)
MONOCYTES NFR BLD AUTO: 6.5 %
NEUTROPHILS # BLD AUTO: 2.95 X10 (3) UL (ref 1.5–7.7)
NEUTROPHILS # BLD AUTO: 2.95 X10(3) UL (ref 1.5–7.7)
NEUTROPHILS NFR BLD AUTO: 65.9 %
OSMOLALITY SERPL CALC.SUM OF ELEC: 280 MOSM/KG (ref 275–295)
P AXIS: 41 DEGREES
P-R INTERVAL: 166 MS
PLATELET # BLD AUTO: 247 10(3)UL (ref 150–450)
POTASSIUM SERPL-SCNC: 4.7 MMOL/L (ref 3.5–5.1)
PROTHROMBIN TIME: 13.8 SECONDS (ref 11.6–14.8)
Q-T INTERVAL: 406 MS
QRS DURATION: 98 MS
QTC CALCULATION (BEZET): 401 MS
R AXIS: 80 DEGREES
RBC # BLD AUTO: 4.92 X10(6)UL
SARS-COV-2 RNA RESP QL NAA+PROBE: NOT DETECTED
SODIUM SERPL-SCNC: 136 MMOL/L (ref 136–145)
T AXIS: 6 DEGREES
TROPONIN I HIGH SENSITIVITY: 10 NG/L
VENTRICULAR RATE: 59 BPM
WBC # BLD AUTO: 4.5 X10(3) UL (ref 4–11)

## 2023-01-03 PROCEDURE — 99223 1ST HOSP IP/OBS HIGH 75: CPT | Performed by: INTERNAL MEDICINE

## 2023-01-03 PROCEDURE — 71045 X-RAY EXAM CHEST 1 VIEW: CPT

## 2023-01-03 PROCEDURE — 93971 EXTREMITY STUDY: CPT

## 2023-01-03 PROCEDURE — 71260 CT THORAX DX C+: CPT | Performed by: EMERGENCY MEDICINE

## 2023-01-03 RX ORDER — CETIRIZINE HYDROCHLORIDE 10 MG/1
10 TABLET ORAL DAILY
COMMUNITY

## 2023-01-03 RX ORDER — HEPARIN SODIUM 1000 [USP'U]/ML
80 INJECTION, SOLUTION INTRAVENOUS; SUBCUTANEOUS ONCE
Status: COMPLETED | OUTPATIENT
Start: 2023-01-03 | End: 2023-01-03

## 2023-01-03 RX ORDER — PANTOPRAZOLE SODIUM 20 MG/1
20 TABLET, DELAYED RELEASE ORAL NIGHTLY
Status: DISCONTINUED | OUTPATIENT
Start: 2023-01-03 | End: 2023-01-07

## 2023-01-03 RX ORDER — DILTIAZEM HYDROCHLORIDE 120 MG/1
240 CAPSULE, EXTENDED RELEASE ORAL NIGHTLY
Status: DISCONTINUED | OUTPATIENT
Start: 2023-01-03 | End: 2023-01-07

## 2023-01-03 RX ORDER — HEPARIN SODIUM AND DEXTROSE 10000; 5 [USP'U]/100ML; G/100ML
INJECTION INTRAVENOUS CONTINUOUS
Status: DISCONTINUED | OUTPATIENT
Start: 2023-01-04 | End: 2023-01-05

## 2023-01-03 RX ORDER — HEPARIN SODIUM AND DEXTROSE 10000; 5 [USP'U]/100ML; G/100ML
18 INJECTION INTRAVENOUS ONCE
Status: COMPLETED | OUTPATIENT
Start: 2023-01-03 | End: 2023-01-04

## 2023-01-03 RX ORDER — ACETAMINOPHEN 500 MG
500 TABLET ORAL EVERY 4 HOURS PRN
Status: DISCONTINUED | OUTPATIENT
Start: 2023-01-03 | End: 2023-01-07

## 2023-01-03 RX ORDER — OMEPRAZOLE 20 MG/1
20 CAPSULE, DELAYED RELEASE ORAL NIGHTLY
COMMUNITY

## 2023-01-03 RX ORDER — CETIRIZINE HYDROCHLORIDE 10 MG/1
10 TABLET ORAL NIGHTLY
Status: DISCONTINUED | OUTPATIENT
Start: 2023-01-03 | End: 2023-01-07

## 2023-01-03 NOTE — ED INITIAL ASSESSMENT (HPI)
Pt to ED with c/o right arm swelling/pain/redness and palpitations that started today. Pt with hx of arrhythmia on diltiazem. Pt currently being treated for left breast cancer. Right chest port in place. Pt denies fever. Pt denies fall or trauma. Pt concerned for blood clot to right arm.

## 2023-01-04 LAB
APTT PPP: 115.4 SECONDS (ref 23.3–35.6)
APTT PPP: 185.3 SECONDS (ref 23.3–35.6)
APTT PPP: 190.5 SECONDS (ref 23.3–35.6)
APTT PPP: 75.5 SECONDS (ref 23.3–35.6)
BASOPHILS # BLD AUTO: 0.03 X10(3) UL (ref 0–0.2)
BASOPHILS NFR BLD AUTO: 0.7 %
DEPRECATED RDW RBC AUTO: 44.3 FL (ref 35.1–46.3)
EOSINOPHIL # BLD AUTO: 0.07 X10(3) UL (ref 0–0.7)
EOSINOPHIL NFR BLD AUTO: 1.5 %
ERYTHROCYTE [DISTWIDTH] IN BLOOD BY AUTOMATED COUNT: 13.8 % (ref 11–15)
HCT VFR BLD AUTO: 39.1 %
HGB BLD-MCNC: 12.7 G/DL
IMM GRANULOCYTES # BLD AUTO: 0.03 X10(3) UL (ref 0–1)
IMM GRANULOCYTES NFR BLD: 0.7 %
LYMPHOCYTES # BLD AUTO: 2.39 X10(3) UL (ref 1–4)
LYMPHOCYTES NFR BLD AUTO: 52.3 %
MCH RBC QN AUTO: 28.4 PG (ref 26–34)
MCHC RBC AUTO-ENTMCNC: 32.5 G/DL (ref 31–37)
MCV RBC AUTO: 87.5 FL
MONOCYTES # BLD AUTO: 0.4 X10(3) UL (ref 0.1–1)
MONOCYTES NFR BLD AUTO: 8.8 %
NEUTROPHILS # BLD AUTO: 1.65 X10 (3) UL (ref 1.5–7.7)
NEUTROPHILS # BLD AUTO: 1.65 X10(3) UL (ref 1.5–7.7)
NEUTROPHILS NFR BLD AUTO: 36 %
PLATELET # BLD AUTO: 219 10(3)UL (ref 150–450)
PLATELET # BLD AUTO: 219 10(3)UL (ref 150–450)
RBC # BLD AUTO: 4.47 X10(6)UL
WBC # BLD AUTO: 4.6 X10(3) UL (ref 4–11)

## 2023-01-04 PROCEDURE — 99233 SBSQ HOSP IP/OBS HIGH 50: CPT | Performed by: INTERNAL MEDICINE

## 2023-01-04 RX ORDER — ECHINACEA PURPUREA EXTRACT 125 MG
1 TABLET ORAL
Status: DISCONTINUED | OUTPATIENT
Start: 2023-01-04 | End: 2023-01-07

## 2023-01-04 NOTE — PLAN OF CARE
Patient VSS, no acute changes during shift.  bedside. Heparin gtt. Updated patient on plan of care. Frequent rounding, no needs at this time. Call light always in reach and safety precautions in place. Problem: Patient Centered Care  Goal: Patient preferences are identified and integrated in the patient's plan of care  Description: Interventions:  - What would you like us to know as we care for you? I am from home with my spouse  - Provide timely, complete, and accurate information to patient/family  - Incorporate patient and family knowledge, values, beliefs, and cultural backgrounds into the planning and delivery of care  - Encourage patient/family to participate in care and decision-making at the level they choose  - Honor patient and family perspectives and choices  Outcome: Progressing     Problem: Patient/Family Goals  Goal: Patient/Family Long Term Goal  Description: Patient's Long Term Goal: Go home    Interventions:  - Heparin gtt  -PTT monitoring  -Monitor labs  - See additional Care Plan goals for specific interventions  Outcome: Progressing  Goal: Patient/Family Short Term Goal  Description: Patient's Short Term Goal: No nausea    Interventions:   - Small frequent meals  -Antiemetic PRN  -Ice chips  - See additional Care Plan goals for specific interventions  Outcome: Progressing     Problem: CARDIOVASCULAR - ADULT  Goal: Maintains optimal cardiac output and hemodynamic stability  Description: INTERVENTIONS:  - Monitor vital signs, rhythm, and trends  - Monitor for bleeding, hypotension and signs of decreased cardiac output  - Evaluate effectiveness of vasoactive medications to optimize hemodynamic stability  - Monitor arterial and/or venous puncture sites for bleeding and/or hematoma  - Assess quality of pulses, skin color and temperature  - Assess for signs of decreased coronary artery perfusion - ex.  Angina  - Evaluate fluid balance, assess for edema, trend weights  Outcome: Progressing Problem: METABOLIC/FLUID AND ELECTROLYTES - ADULT  Goal: Electrolytes maintained within normal limits  Description: INTERVENTIONS:  - Monitor labs and rhythm and assess patient for signs and symptoms of electrolyte imbalances  - Administer electrolyte replacement as ordered  - Monitor response to electrolyte replacements, including rhythm and repeat lab results as appropriate  - Fluid restriction as ordered  - Instruct patient on fluid and nutrition restrictions as appropriate  Outcome: Progressing  Goal: Hemodynamic stability and optimal renal function maintained  Description: INTERVENTIONS:  - Monitor labs and assess for signs and symptoms of volume excess or deficit  - Monitor intake, output and patient weight  - Monitor urine specific gravity, serum osmolarity and serum sodium as indicated or ordered  - Monitor response to interventions for patient's volume status, including labs, urine output, blood pressure (other measures as available)  - Encourage oral intake as appropriate  - Instruct patient on fluid and nutrition restrictions as appropriate  Outcome: Progressing     Problem: HEMATOLOGIC - ADULT  Goal: Maintains hematologic stability  Description: INTERVENTIONS  - Assess for signs and symptoms of bleeding or hemorrhage  - Monitor labs and vital signs for trends  - Administer supportive blood products/factors, fluids and medications as ordered and appropriate  - Administer supportive blood products/factors as ordered and appropriate  Outcome: Progressing  Goal: Free from bleeding injury  Description: (Example usage: patient with low platelets)  INTERVENTIONS:  - Avoid intramuscular injections, enemas and rectal medication administration  - Ensure safe mobilization of patient  - Hold pressure on venipuncture sites to achieve adequate hemostasis  - Assess for signs and symptoms of internal bleeding  - Monitor lab trends  - Patient is to report abnormal signs of bleeding to staff  - Avoid use of toothpicks and dental floss  - Use electric shaver for shaving  - Use soft bristle tooth brush  - Limit straining and forceful nose blowing  Outcome: Progressing

## 2023-01-04 NOTE — ED QUICK NOTES
Orders for admission, patient is aware of plan and ready to go upstairs. Any questions, please call ED RN Maryjo Muller at extension [phone].      Patient Covid vaccination status: Unvaccinated     COVID Test Ordered in ED: Rapid SARS-CoV-2 by PCR    COVID Suspicion at Admission: N/A    Running Infusions:  Heparin 2300 units/hr (23mL/hr)    Mental Status/LOC at time of transport: A&Ox4    Other pertinent information:   CIWA score: N/A   NIH score:  N/A

## 2023-01-04 NOTE — PROGRESS NOTES
omeprazole (PriLOSEC) DR cap 20 mg daily  is Non-Formulary Medication &  Auto-Substituted to Pantoprazole 20mg daily Per P&T PROTOCOL

## 2023-01-05 ENCOUNTER — APPOINTMENT (OUTPATIENT)
Dept: INTERVENTIONAL RADIOLOGY/VASCULAR | Facility: HOSPITAL | Age: 48
End: 2023-01-05
Attending: CLINICAL NURSE SPECIALIST
Payer: COMMERCIAL

## 2023-01-05 LAB
ANION GAP SERPL CALC-SCNC: 4 MMOL/L (ref 0–18)
APTT PPP: 91.1 SECONDS (ref 23.3–35.6)
BASOPHILS # BLD AUTO: 0.02 X10(3) UL (ref 0–0.2)
BASOPHILS NFR BLD AUTO: 0.5 %
BUN BLD-MCNC: 8 MG/DL (ref 7–18)
BUN/CREAT SERPL: 14 (ref 10–20)
CALCIUM BLD-MCNC: 8.6 MG/DL (ref 8.5–10.1)
CHLORIDE SERPL-SCNC: 109 MMOL/L (ref 98–112)
CO2 SERPL-SCNC: 27 MMOL/L (ref 21–32)
CREAT BLD-MCNC: 0.57 MG/DL
DEPRECATED RDW RBC AUTO: 44.9 FL (ref 35.1–46.3)
EOSINOPHIL # BLD AUTO: 0.06 X10(3) UL (ref 0–0.7)
EOSINOPHIL NFR BLD AUTO: 1.5 %
ERYTHROCYTE [DISTWIDTH] IN BLOOD BY AUTOMATED COUNT: 14.1 % (ref 11–15)
GFR SERPLBLD BASED ON 1.73 SQ M-ARVRAT: 113 ML/MIN/1.73M2 (ref 60–?)
GLUCOSE BLD-MCNC: 90 MG/DL (ref 70–99)
HCT VFR BLD AUTO: 37 %
HGB BLD-MCNC: 12 G/DL
IMM GRANULOCYTES # BLD AUTO: 0.01 X10(3) UL (ref 0–1)
IMM GRANULOCYTES NFR BLD: 0.2 %
LYMPHOCYTES # BLD AUTO: 2.43 X10(3) UL (ref 1–4)
LYMPHOCYTES NFR BLD AUTO: 59 %
MCH RBC QN AUTO: 28.5 PG (ref 26–34)
MCHC RBC AUTO-ENTMCNC: 32.4 G/DL (ref 31–37)
MCV RBC AUTO: 87.9 FL
MONOCYTES # BLD AUTO: 0.51 X10(3) UL (ref 0.1–1)
MONOCYTES NFR BLD AUTO: 12.4 %
NEUTROPHILS # BLD AUTO: 1.09 X10 (3) UL (ref 1.5–7.7)
NEUTROPHILS # BLD AUTO: 1.09 X10(3) UL (ref 1.5–7.7)
NEUTROPHILS NFR BLD AUTO: 26.4 %
OSMOLALITY SERPL CALC.SUM OF ELEC: 288 MOSM/KG (ref 275–295)
PLATELET # BLD AUTO: 215 10(3)UL (ref 150–450)
POTASSIUM SERPL-SCNC: 3.7 MMOL/L (ref 3.5–5.1)
RBC # BLD AUTO: 4.21 X10(6)UL
SODIUM SERPL-SCNC: 140 MMOL/L (ref 136–145)
WBC # BLD AUTO: 4.1 X10(3) UL (ref 4–11)

## 2023-01-05 PROCEDURE — 05753ZZ DILATION OF RIGHT SUBCLAVIAN VEIN, PERCUTANEOUS APPROACH: ICD-10-PCS | Performed by: RADIOLOGY

## 2023-01-05 PROCEDURE — 0JPT0WZ REMOVAL OF TOTALLY IMPLANTABLE VASCULAR ACCESS DEVICE FROM TRUNK SUBCUTANEOUS TISSUE AND FASCIA, OPEN APPROACH: ICD-10-PCS | Performed by: RADIOLOGY

## 2023-01-05 PROCEDURE — 05F73ZZ FRAGMENTATION OF RIGHT AXILLARY VEIN, PERCUTANEOUS APPROACH: ICD-10-PCS | Performed by: RADIOLOGY

## 2023-01-05 PROCEDURE — 05PYX3Z REMOVAL OF INFUSION DEVICE FROM UPPER VEIN, EXTERNAL APPROACH: ICD-10-PCS | Performed by: RADIOLOGY

## 2023-01-05 PROCEDURE — 057B3ZZ DILATION OF RIGHT BASILIC VEIN, PERCUTANEOUS APPROACH: ICD-10-PCS | Performed by: RADIOLOGY

## 2023-01-05 PROCEDURE — 05773ZZ DILATION OF RIGHT AXILLARY VEIN, PERCUTANEOUS APPROACH: ICD-10-PCS | Performed by: RADIOLOGY

## 2023-01-05 PROCEDURE — 05F53ZZ FRAGMENTATION OF RIGHT SUBCLAVIAN VEIN, PERCUTANEOUS APPROACH: ICD-10-PCS | Performed by: RADIOLOGY

## 2023-01-05 PROCEDURE — 99233 SBSQ HOSP IP/OBS HIGH 50: CPT | Performed by: HOSPITALIST

## 2023-01-05 PROCEDURE — 3E04317 INTRODUCTION OF OTHER THROMBOLYTIC INTO CENTRAL VEIN, PERCUTANEOUS APPROACH: ICD-10-PCS | Performed by: RADIOLOGY

## 2023-01-05 RX ORDER — MIDAZOLAM HYDROCHLORIDE 1 MG/ML
INJECTION INTRAMUSCULAR; INTRAVENOUS
Status: COMPLETED
Start: 2023-01-05 | End: 2023-01-05

## 2023-01-05 RX ORDER — KETOROLAC TROMETHAMINE 30 MG/ML
30 INJECTION, SOLUTION INTRAMUSCULAR; INTRAVENOUS ONCE
Status: COMPLETED | OUTPATIENT
Start: 2023-01-05 | End: 2023-01-05

## 2023-01-05 RX ORDER — HYDROCODONE BITARTRATE AND ACETAMINOPHEN 10; 325 MG/1; MG/1
1 TABLET ORAL EVERY 4 HOURS PRN
Status: DISCONTINUED | OUTPATIENT
Start: 2023-01-05 | End: 2023-01-07

## 2023-01-05 RX ORDER — LIDOCAINE HYDROCHLORIDE 20 MG/ML
INJECTION, SOLUTION INFILTRATION; PERINEURAL
Status: COMPLETED
Start: 2023-01-05 | End: 2023-01-05

## 2023-01-05 RX ORDER — ACETAMINOPHEN 500 MG
TABLET ORAL
Status: COMPLETED
Start: 2023-01-05 | End: 2023-01-05

## 2023-01-05 RX ORDER — WATER 1000 ML/1000ML
INJECTION, SOLUTION INTRAVENOUS
Status: COMPLETED
Start: 2023-01-05 | End: 2023-01-05

## 2023-01-05 RX ORDER — ENOXAPARIN SODIUM 150 MG/ML
1 INJECTION SUBCUTANEOUS EVERY 12 HOURS SCHEDULED
Status: DISCONTINUED | OUTPATIENT
Start: 2023-01-05 | End: 2023-01-07

## 2023-01-05 RX ORDER — HEPARIN SODIUM 1000 [USP'U]/ML
INJECTION, SOLUTION INTRAVENOUS; SUBCUTANEOUS
Status: COMPLETED
Start: 2023-01-05 | End: 2023-01-05

## 2023-01-05 NOTE — PRE-SEDATION ASSESSMENT
[facility]  IR Pre-Procedure Sedation Assessment    History of snoring or sleep or apnea? No    History of previous problems with anesthesia or sedation  No    Physical Findings:  Neck: nl ROM  CV: RRR  PULM: normal respiratory rate/effort    Mallampati Score:  II (hard and soft palate, upper portion of tonsils anduvula visible)    ASA Classification:   2.  Patient with mild systemic disease    Plan:   -IV moderate sedation    Kaylee Ozuna MD

## 2023-01-05 NOTE — PLAN OF CARE
Patient VSS, no acute changes during shift. Heparin gtt running and titrated per protocol. Daughter bedside. Updated patient on plan of care. Frequent rounding, no needs at this time. Call light always in reach and safety precautions in place. Problem: Patient Centered Care  Goal: Patient preferences are identified and integrated in the patient's plan of care  Description: Interventions:  - What would you like us to know as we care for you?  From home with family  - Provide timely, complete, and accurate information to patient/family  - Incorporate patient and family knowledge, values, beliefs, and cultural backgrounds into the planning and delivery of care  - Encourage patient/family to participate in care and decision-making at the level they choose  - Honor patient and family perspectives and choices  Outcome: Progressing     Problem: Patient/Family Goals  Goal: Patient/Family Long Term Goal  Description: Patient's Long Term Goal: No further blood clots    Interventions:  - continue anticoagulation as prescribed  - plan for possible thrombectomy  - See additional Care Plan goals for specific interventions  Outcome: Progressing  Goal: Patient/Family Short Term Goal  Description: Patient's Short Term Goal: Less pain in R arm    Interventions:   - heparin drip to treat DVT R arm  - prn pain meds  - elevate R arm  - See additional Care Plan goals for specific interventions  Outcome: Progressing     Problem: CARDIOVASCULAR - ADULT  Goal: Maintains optimal cardiac output and hemodynamic stability  Description: INTERVENTIONS:  - Monitor vital signs, rhythm, and trends  - Monitor for bleeding, hypotension and signs of decreased cardiac output  - Evaluate effectiveness of vasoactive medications to optimize hemodynamic stability  - Monitor arterial and/or venous puncture sites for bleeding and/or hematoma  - Assess quality of pulses, skin color and temperature  - Assess for signs of decreased coronary artery perfusion - ex. Angina  - Evaluate fluid balance, assess for edema, trend weights  Outcome: Progressing     Problem: METABOLIC/FLUID AND ELECTROLYTES - ADULT  Goal: Electrolytes maintained within normal limits  Description: INTERVENTIONS:  - Monitor labs and rhythm and assess patient for signs and symptoms of electrolyte imbalances  - Administer electrolyte replacement as ordered  - Monitor response to electrolyte replacements, including rhythm and repeat lab results as appropriate  - Fluid restriction as ordered  - Instruct patient on fluid and nutrition restrictions as appropriate  Outcome: Progressing  Goal: Hemodynamic stability and optimal renal function maintained  Description: INTERVENTIONS:  - Monitor labs and assess for signs and symptoms of volume excess or deficit  - Monitor intake, output and patient weight  - Monitor urine specific gravity, serum osmolarity and serum sodium as indicated or ordered  - Monitor response to interventions for patient's volume status, including labs, urine output, blood pressure (other measures as available)  - Encourage oral intake as appropriate  - Instruct patient on fluid and nutrition restrictions as appropriate  Outcome: Progressing     Problem: HEMATOLOGIC - ADULT  Goal: Maintains hematologic stability  Description: INTERVENTIONS  - Assess for signs and symptoms of bleeding or hemorrhage  - Monitor labs and vital signs for trends  - Administer supportive blood products/factors, fluids and medications as ordered and appropriate  - Administer supportive blood products/factors as ordered and appropriate  Outcome: Progressing  Goal: Free from bleeding injury  Description: (Example usage: patient with low platelets)  INTERVENTIONS:  - Avoid intramuscular injections, enemas and rectal medication administration  - Ensure safe mobilization of patient  - Hold pressure on venipuncture sites to achieve adequate hemostasis  - Assess for signs and symptoms of internal bleeding  - Monitor lab trends  - Patient is to report abnormal signs of bleeding to staff  - Avoid use of toothpicks and dental floss  - Use electric shaver for shaving  - Use soft bristle tooth brush  - Limit straining and forceful nose blowing  Outcome: Progressing

## 2023-01-05 NOTE — PLAN OF CARE
Heparin drip continued and titrated per protocol. No c/o pain to R arm. R arm reportedly less swollen and reddened compared to how it was at time of admission. Plan possible IR procedure/thrombectomy. Keep R arm elevated. Problem: Patient Centered Care  Goal: Patient preferences are identified and integrated in the patient's plan of care  Description: Interventions:  - What would you like us to know as we care for you? From home with family  - Provide timely, complete, and accurate information to patient/family  - Incorporate patient and family knowledge, values, beliefs, and cultural backgrounds into the planning and delivery of care  - Encourage patient/family to participate in care and decision-making at the level they choose  - Honor patient and family perspectives and choices  Outcome: Progressing     Problem: Patient/Family Goals  Goal: Patient/Family Short Term Goal  Description: Patient's Short Term Goal: Less pain in R arm    Interventions:   - heparin drip to treat DVT R arm  - prn pain meds  - elevate R arm  - See additional Care Plan goals for specific interventions  Outcome: Progressing     Problem: CARDIOVASCULAR - ADULT  Goal: Maintains optimal cardiac output and hemodynamic stability  Description: INTERVENTIONS:  - Monitor vital signs, rhythm, and trends  - Monitor for bleeding, hypotension and signs of decreased cardiac output  - Evaluate effectiveness of vasoactive medications to optimize hemodynamic stability  - Monitor arterial and/or venous puncture sites for bleeding and/or hematoma  - Assess quality of pulses, skin color and temperature  - Assess for signs of decreased coronary artery perfusion - ex.  Angina  - Evaluate fluid balance, assess for edema, trend weights  Outcome: Progressing     Problem: METABOLIC/FLUID AND ELECTROLYTES - ADULT  Goal: Electrolytes maintained within normal limits  Description: INTERVENTIONS:  - Monitor labs and rhythm and assess patient for signs and symptoms of electrolyte imbalances  - Administer electrolyte replacement as ordered  - Monitor response to electrolyte replacements, including rhythm and repeat lab results as appropriate  - Fluid restriction as ordered  - Instruct patient on fluid and nutrition restrictions as appropriate  Outcome: Progressing  Goal: Hemodynamic stability and optimal renal function maintained  Description: INTERVENTIONS:  - Monitor labs and assess for signs and symptoms of volume excess or deficit  - Monitor intake, output and patient weight  - Monitor urine specific gravity, serum osmolarity and serum sodium as indicated or ordered  - Monitor response to interventions for patient's volume status, including labs, urine output, blood pressure (other measures as available)  - Encourage oral intake as appropriate  - Instruct patient on fluid and nutrition restrictions as appropriate  Outcome: Progressing     Problem: HEMATOLOGIC - ADULT  Goal: Maintains hematologic stability  Description: INTERVENTIONS  - Assess for signs and symptoms of bleeding or hemorrhage  - Monitor labs and vital signs for trends  - Administer supportive blood products/factors, fluids and medications as ordered and appropriate  - Administer supportive blood products/factors as ordered and appropriate  Outcome: Progressing  Goal: Free from bleeding injury  Description: (Example usage: patient with low platelets)  INTERVENTIONS:  - Avoid intramuscular injections, enemas and rectal medication administration  - Ensure safe mobilization of patient  - Hold pressure on venipuncture sites to achieve adequate hemostasis  - Assess for signs and symptoms of internal bleeding  - Monitor lab trends  - Patient is to report abnormal signs of bleeding to staff  - Avoid use of toothpicks and dental floss  - Use electric shaver for shaving  - Use soft bristle tooth brush  - Limit straining and forceful nose blowing  Outcome: Progressing     Problem: Patient/Family Goals  Goal: Patient/Family skin normal color for race, warm, dry and intact. Long Term Goal  Description: Patient's Long Term Goal: No further blood clots    Interventions:  - continue anticoagulation as prescribed  - plan for possible thrombectomy  - See additional Care Plan goals for specific interventions  Outcome: Not Progressing

## 2023-01-05 NOTE — PROCEDURES
[facility]  Procedure Note    Ova Michael Patient Status:  Inpatient    1975 MRN [id number]   [facility] Attending Tracey Ivan MD   Hosp Day # 2 PCP Jose Henao MD     Procedure: Right upper extremity venogram with thrombectomy, subclavian vein angioplasty    Pre-Procedure Diagnosis: Acute deep vein thrombosis (DVT) of axillary vein of right upper extremity ([address] Selvin. A11]  Acute deep vein thrombosis (DVT) of brachial vein of right upper extremity (HCC) [I82.621]      Post-Procedure Diagnosis: Acute deep vein thrombosis (DVT) of axillary vein of right upper extremity (HCC) [place].S[place]. A11]  Acute deep vein thrombosis (DVT) of brachial vein of right upper extremity (HCC) [I82.621]    Anesthesia:  Local and Sedation    Findings:  Ultrasound reveals non-occlusive thrombus in basilic vein. Basilic vein then accessed, and 8Fr sheath placed. Venogram reveals occlusion of left axillary and subclavian veins. Thrombus traversed with wire. Existing right subclavian chest port removed. Incision closed with resorbable suture and skin adhesive. 10mg t-pa delivered across thrombus via Angiojet. Rheolytic thrombectomy then performed followed by 12mm subclavian vein angioplasty, 9mm basilic and axillary vein angioplasty. Final venogram reveals good antegrade flow with only mild residual central subclavian vein stenosis. Specimens: None    Blood Loss:  55PD      Complications:  None    Drains:  None    Plan:  Lovenox 1mg/kg BID beginning tonight. Replace port as outpatient on Tuesday, January 10. Evidence of thoracic inlet compression/Paget-Shroetter, though no indication for surgical decompression at this time.     Jacqueline Mendez MD  2023

## 2023-01-05 NOTE — PLAN OF CARE
Problem: Patient Centered Care  Goal: Patient preferences are identified and integrated in the patient's plan of care  Description: Interventions:  - What would you like us to know as we care for you? From home with family  - Provide timely, complete, and accurate information to patient/family  - Incorporate patient and family knowledge, values, beliefs, and cultural backgrounds into the planning and delivery of care  - Encourage patient/family to participate in care and decision-making at the level they choose  - Honor patient and family perspectives and choices  Outcome: Progressing     Problem: Patient/Family Goals  Goal: Patient/Family Long Term Goal  Description: Patient's Long Term Goal: No further blood clots    Interventions:  - continue anticoagulation as prescribed  - plan for possible thrombectomy  - See additional Care Plan goals for specific interventions  Outcome: Progressing  Goal: Patient/Family Short Term Goal  Description: Patient's Short Term Goal: Less pain in R arm    Interventions:   - heparin drip to treat DVT R arm  - prn pain meds  - elevate R arm  - See additional Care Plan goals for specific interventions  Outcome: Progressing     Problem: CARDIOVASCULAR - ADULT  Goal: Maintains optimal cardiac output and hemodynamic stability  Description: INTERVENTIONS:  - Monitor vital signs, rhythm, and trends  - Monitor for bleeding, hypotension and signs of decreased cardiac output  - Evaluate effectiveness of vasoactive medications to optimize hemodynamic stability  - Monitor arterial and/or venous puncture sites for bleeding and/or hematoma  - Assess quality of pulses, skin color and temperature  - Assess for signs of decreased coronary artery perfusion - ex.  Angina  - Evaluate fluid balance, assess for edema, trend weights  Outcome: Progressing     Problem: METABOLIC/FLUID AND ELECTROLYTES - ADULT  Goal: Electrolytes maintained within normal limits  Description: INTERVENTIONS:  - Monitor labs and rhythm and assess patient for signs and symptoms of electrolyte imbalances  - Administer electrolyte replacement as ordered  - Monitor response to electrolyte replacements, including rhythm and repeat lab results as appropriate  - Fluid restriction as ordered  - Instruct patient on fluid and nutrition restrictions as appropriate  Outcome: Progressing  Goal: Hemodynamic stability and optimal renal function maintained  Description: INTERVENTIONS:  - Monitor labs and assess for signs and symptoms of volume excess or deficit  - Monitor intake, output and patient weight  - Monitor urine specific gravity, serum osmolarity and serum sodium as indicated or ordered  - Monitor response to interventions for patient's volume status, including labs, urine output, blood pressure (other measures as available)  - Encourage oral intake as appropriate  - Instruct patient on fluid and nutrition restrictions as appropriate  Outcome: Progressing     Problem: HEMATOLOGIC - ADULT  Goal: Maintains hematologic stability  Description: INTERVENTIONS  - Assess for signs and symptoms of bleeding or hemorrhage  - Monitor labs and vital signs for trends  - Administer supportive blood products/factors, fluids and medications as ordered and appropriate  - Administer supportive blood products/factors as ordered and appropriate  Outcome: Progressing  Goal: Free from bleeding injury  Description: (Example usage: patient with low platelets)  INTERVENTIONS:  - Avoid intramuscular injections, enemas and rectal medication administration  - Ensure safe mobilization of patient  - Hold pressure on venipuncture sites to achieve adequate hemostasis  - Assess for signs and symptoms of internal bleeding  - Monitor lab trends  - Patient is to report abnormal signs of bleeding to staff  - Avoid use of toothpicks and dental floss  - Use electric shaver for shaving  - Use soft bristle tooth brush  - Limit straining and forceful nose blowing  Outcome: Progressing

## 2023-01-06 VITALS
SYSTOLIC BLOOD PRESSURE: 132 MMHG | HEIGHT: 68 IN | OXYGEN SATURATION: 98 % | DIASTOLIC BLOOD PRESSURE: 60 MMHG | WEIGHT: 281.19 LBS | TEMPERATURE: 98 F | HEART RATE: 65 BPM | BODY MASS INDEX: 42.62 KG/M2 | RESPIRATION RATE: 18 BRPM

## 2023-01-06 LAB
ANION GAP SERPL CALC-SCNC: 5 MMOL/L (ref 0–18)
APTT PPP: 35.2 SECONDS (ref 23.3–35.6)
BASOPHILS # BLD AUTO: 0.01 X10(3) UL (ref 0–0.2)
BASOPHILS NFR BLD AUTO: 0.3 %
BUN BLD-MCNC: 14 MG/DL (ref 7–18)
BUN/CREAT SERPL: 20.6 (ref 10–20)
CALCIUM BLD-MCNC: 8.5 MG/DL (ref 8.5–10.1)
CHLORIDE SERPL-SCNC: 108 MMOL/L (ref 98–112)
CO2 SERPL-SCNC: 27 MMOL/L (ref 21–32)
CREAT BLD-MCNC: 0.68 MG/DL
DEPRECATED RDW RBC AUTO: 46.1 FL (ref 35.1–46.3)
EOSINOPHIL # BLD AUTO: 0.02 X10(3) UL (ref 0–0.7)
EOSINOPHIL NFR BLD AUTO: 0.5 %
ERYTHROCYTE [DISTWIDTH] IN BLOOD BY AUTOMATED COUNT: 14.3 % (ref 11–15)
GFR SERPLBLD BASED ON 1.73 SQ M-ARVRAT: 108 ML/MIN/1.73M2 (ref 60–?)
GLUCOSE BLD-MCNC: 90 MG/DL (ref 70–99)
HCT VFR BLD AUTO: 37.4 %
HGB BLD-MCNC: 11.8 G/DL
IMM GRANULOCYTES # BLD AUTO: 0.01 X10(3) UL (ref 0–1)
IMM GRANULOCYTES NFR BLD: 0.3 %
LYMPHOCYTES # BLD AUTO: 1.76 X10(3) UL (ref 1–4)
LYMPHOCYTES NFR BLD AUTO: 44.8 %
MCH RBC QN AUTO: 28.4 PG (ref 26–34)
MCHC RBC AUTO-ENTMCNC: 31.6 G/DL (ref 31–37)
MCV RBC AUTO: 90.1 FL
MONOCYTES # BLD AUTO: 0.57 X10(3) UL (ref 0.1–1)
MONOCYTES NFR BLD AUTO: 14.5 %
NEUTROPHILS # BLD AUTO: 1.56 X10 (3) UL (ref 1.5–7.7)
NEUTROPHILS # BLD AUTO: 1.56 X10(3) UL (ref 1.5–7.7)
NEUTROPHILS NFR BLD AUTO: 39.6 %
OSMOLALITY SERPL CALC.SUM OF ELEC: 290 MOSM/KG (ref 275–295)
PLATELET # BLD AUTO: 214 10(3)UL (ref 150–450)
POTASSIUM SERPL-SCNC: 3.6 MMOL/L (ref 3.5–5.1)
RBC # BLD AUTO: 4.15 X10(6)UL
SODIUM SERPL-SCNC: 140 MMOL/L (ref 136–145)
WBC # BLD AUTO: 3.9 X10(3) UL (ref 4–11)

## 2023-01-06 PROCEDURE — 99239 HOSP IP/OBS DSCHRG MGMT >30: CPT | Performed by: HOSPITALIST

## 2023-01-06 RX ORDER — ENOXAPARIN SODIUM 150 MG/ML
1 INJECTION SUBCUTANEOUS EVERY 12 HOURS SCHEDULED
Qty: 5.1 ML | Refills: 0 | Status: SHIPPED | OUTPATIENT
Start: 2023-01-06 | End: 2023-01-10

## 2023-01-06 RX ORDER — KETOROLAC TROMETHAMINE 15 MG/ML
15 INJECTION, SOLUTION INTRAMUSCULAR; INTRAVENOUS ONCE
Status: COMPLETED | OUTPATIENT
Start: 2023-01-06 | End: 2023-01-06

## 2023-01-06 RX ORDER — ACETAMINOPHEN 500 MG
500 TABLET ORAL 3 TIMES DAILY
Qty: 21 TABLET | Refills: 0 | Status: SHIPPED | OUTPATIENT
Start: 2023-01-06 | End: 2023-01-13

## 2023-01-06 RX ORDER — KETOROLAC TROMETHAMINE 15 MG/ML
15 INJECTION, SOLUTION INTRAMUSCULAR; INTRAVENOUS EVERY 6 HOURS PRN
Status: DISCONTINUED | OUTPATIENT
Start: 2023-01-06 | End: 2023-01-07

## 2023-01-06 RX ORDER — IBUPROFEN 600 MG/1
600 TABLET ORAL EVERY 8 HOURS PRN
Qty: 15 TABLET | Refills: 0 | Status: SHIPPED | OUTPATIENT
Start: 2023-01-06 | End: 2023-01-11

## 2023-01-06 NOTE — DISCHARGE INSTRUCTIONS
Hold lovenox for 12 hours prior to procedure on 1/10/23. Follow up with oncology at [facility].

## 2023-01-06 NOTE — PLAN OF CARE
Patient VSS, no acute changes during shift. Thrombectomy done 1/5. Heparin gtt discontinued and lovenox started. Updated patient on plan of care. Family bedside. Frequent rounding, no needs at this time. Call light always in reach and safety precautions in place. Problem: Patient Centered Care  Goal: Patient preferences are identified and integrated in the patient's plan of care  Description: Interventions:  - What would you like us to know as we care for you?  From home with family  - Provide timely, complete, and accurate information to patient/family  - Incorporate patient and family knowledge, values, beliefs, and cultural backgrounds into the planning and delivery of care  - Encourage patient/family to participate in care and decision-making at the level they choose  - Honor patient and family perspectives and choices  Outcome: Progressing     Problem: Patient/Family Goals  Goal: Patient/Family Long Term Goal  Description: Patient's Long Term Goal: No further blood clots    Interventions:  - continue anticoagulation as prescribed  - plan for possible thrombectomy  - See additional Care Plan goals for specific interventions  Outcome: Progressing  Goal: Patient/Family Short Term Goal  Description: Patient's Short Term Goal: Less pain in R arm    Interventions:   - heparin drip to treat DVT R arm  - prn pain meds  - elevate R arm  - See additional Care Plan goals for specific interventions  Outcome: Progressing     Problem: CARDIOVASCULAR - ADULT  Goal: Maintains optimal cardiac output and hemodynamic stability  Description: INTERVENTIONS:  - Monitor vital signs, rhythm, and trends  - Monitor for bleeding, hypotension and signs of decreased cardiac output  - Evaluate effectiveness of vasoactive medications to optimize hemodynamic stability  - Monitor arterial and/or venous puncture sites for bleeding and/or hematoma  - Assess quality of pulses, skin color and temperature  - Assess for signs of decreased coronary artery perfusion - ex.  Angina  - Evaluate fluid balance, assess for edema, trend weights  Outcome: Progressing     Problem: METABOLIC/FLUID AND ELECTROLYTES - ADULT  Goal: Electrolytes maintained within normal limits  Description: INTERVENTIONS:  - Monitor labs and rhythm and assess patient for signs and symptoms of electrolyte imbalances  - Administer electrolyte replacement as ordered  - Monitor response to electrolyte replacements, including rhythm and repeat lab results as appropriate  - Fluid restriction as ordered  - Instruct patient on fluid and nutrition restrictions as appropriate  Outcome: Progressing  Goal: Hemodynamic stability and optimal renal function maintained  Description: INTERVENTIONS:  - Monitor labs and assess for signs and symptoms of volume excess or deficit  - Monitor intake, output and patient weight  - Monitor urine specific gravity, serum osmolarity and serum sodium as indicated or ordered  - Monitor response to interventions for patient's volume status, including labs, urine output, blood pressure (other measures as available)  - Encourage oral intake as appropriate  - Instruct patient on fluid and nutrition restrictions as appropriate  Outcome: Progressing     Problem: HEMATOLOGIC - ADULT  Goal: Maintains hematologic stability  Description: INTERVENTIONS  - Assess for signs and symptoms of bleeding or hemorrhage  - Monitor labs and vital signs for trends  - Administer supportive blood products/factors, fluids and medications as ordered and appropriate  - Administer supportive blood products/factors as ordered and appropriate  Outcome: Progressing  Goal: Free from bleeding injury  Description: (Example usage: patient with low platelets)  INTERVENTIONS:  - Avoid intramuscular injections, enemas and rectal medication administration  - Ensure safe mobilization of patient  - Hold pressure on venipuncture sites to achieve adequate hemostasis  - Assess for signs and symptoms of internal bleeding  - Monitor lab trends  - Patient is to report abnormal signs of bleeding to staff  - Avoid use of toothpicks and dental floss  - Use electric shaver for shaving  - Use soft bristle tooth brush  - Limit straining and forceful nose blowing  Outcome: Progressing

## 2023-01-06 NOTE — IVS NOTE
Hand-Off     Procedure hand off report given to [facility]. Pt's vital signs are stable. Right brachial and right chest procedural access sites are dry and intact with no signs and symptoms of bleeding and hematoma.
stated

## 2023-01-10 ENCOUNTER — HOSPITAL ENCOUNTER (OUTPATIENT)
Dept: INTERVENTIONAL RADIOLOGY/VASCULAR | Facility: HOSPITAL | Age: 48
Discharge: HOME | End: 2023-01-10
Attending: CLINICAL NURSE SPECIALIST | Admitting: RADIOLOGY
Payer: COMMERCIAL

## 2023-01-10 VITALS
TEMPERATURE: 98 F | HEIGHT: 68 IN | OXYGEN SATURATION: 94 % | DIASTOLIC BLOOD PRESSURE: 45 MMHG | HEART RATE: 63 BPM | RESPIRATION RATE: 18 BRPM | SYSTOLIC BLOOD PRESSURE: 107 MMHG | BODY MASS INDEX: 44.41 KG/M2 | WEIGHT: 293 LBS

## 2023-01-10 DIAGNOSIS — I82.A11 ACUTE DEEP VEIN THROMBOSIS (DVT) OF AXILLARY VEIN OF RIGHT UPPER EXTREMITY (HCC): ICD-10-CM

## 2023-01-10 LAB — SARS-COV-2 RNA RESP QL NAA+PROBE: NOT DETECTED

## 2023-01-10 PROCEDURE — 99152 MOD SED SAME PHYS/QHP 5/>YRS: CPT

## 2023-01-10 PROCEDURE — 0JH60WZ INSERTION OF TOTALLY IMPLANTABLE VASCULAR ACCESS DEVICE INTO CHEST SUBCUTANEOUS TISSUE AND FASCIA, OPEN APPROACH: ICD-10-PCS | Performed by: RADIOLOGY

## 2023-01-10 PROCEDURE — 99153 MOD SED SAME PHYS/QHP EA: CPT

## 2023-01-10 PROCEDURE — 36561 INSERT TUNNELED CV CATH: CPT

## 2023-01-10 PROCEDURE — 36415 COLL VENOUS BLD VENIPUNCTURE: CPT

## 2023-01-10 PROCEDURE — 02HV33Z INSERTION OF INFUSION DEVICE INTO SUPERIOR VENA CAVA, PERCUTANEOUS APPROACH: ICD-10-PCS | Performed by: RADIOLOGY

## 2023-01-10 PROCEDURE — 77001 FLUOROGUIDE FOR VEIN DEVICE: CPT

## 2023-01-10 RX ORDER — OXYCODONE HYDROCHLORIDE AND ACETAMINOPHEN 5; 325 MG/1; MG/1
1-2 TABLET ORAL EVERY 6 HOURS PRN
Qty: 20 TABLET | Refills: 0 | Status: SHIPPED | OUTPATIENT
Start: 2023-01-10

## 2023-01-10 RX ORDER — HEPARIN SODIUM (PORCINE) LOCK FLUSH IV SOLN 100 UNIT/ML 100 UNIT/ML
SOLUTION INTRAVENOUS
Status: COMPLETED
Start: 2023-01-10 | End: 2023-01-10

## 2023-01-10 RX ORDER — KETOROLAC TROMETHAMINE 30 MG/ML
30 INJECTION, SOLUTION INTRAMUSCULAR; INTRAVENOUS ONCE
Status: COMPLETED | OUTPATIENT
Start: 2023-01-10 | End: 2023-01-10

## 2023-01-10 RX ORDER — KETOROLAC TROMETHAMINE 10 MG/1
10 TABLET, FILM COATED ORAL EVERY 6 HOURS PRN
Qty: 20 TABLET | Refills: 0 | Status: SHIPPED | OUTPATIENT
Start: 2023-01-10

## 2023-01-10 RX ORDER — MIDAZOLAM HYDROCHLORIDE 1 MG/ML
INJECTION INTRAMUSCULAR; INTRAVENOUS
Status: COMPLETED
Start: 2023-01-10 | End: 2023-01-10

## 2023-01-10 RX ORDER — CEFAZOLIN SODIUM/WATER 2 G/20 ML
SYRINGE (ML) INTRAVENOUS
Status: COMPLETED
Start: 2023-01-10 | End: 2023-01-10

## 2023-01-10 RX ORDER — SODIUM CHLORIDE 9 MG/ML
INJECTION, SOLUTION INTRAVENOUS CONTINUOUS
Status: DISCONTINUED | OUTPATIENT
Start: 2023-01-10 | End: 2023-01-10

## 2023-01-10 RX ORDER — LIDOCAINE HYDROCHLORIDE 20 MG/ML
INJECTION, SOLUTION INFILTRATION; PERINEURAL
Status: COMPLETED
Start: 2023-01-10 | End: 2023-01-10

## 2023-01-10 RX ADMIN — SODIUM CHLORIDE: 9 INJECTION, SOLUTION INTRAVENOUS at 08:30:00

## 2023-01-10 RX ADMIN — KETOROLAC TROMETHAMINE 30 MG: 30 INJECTION, SOLUTION INTRAMUSCULAR; INTRAVENOUS at 13:33:00

## 2023-01-10 NOTE — DISCHARGE INSTRUCTIONS
Pr-14  4.2  [phone]     Patient Name:  Jo Ann Richey    Procedure:  Chest Port Insertion     Site Care: Dermabond (skin glue) has been applied to your incision. Do not scrub the area. Allow the Dermabond to flake off on its own. No showering for 24 hours. After 24 hours you can shower with mild soap and water and dab dry. No scrubbing the site. No ointment, powder, or cream to the site. No swimming pools or baths. Activity/Diet  No heavy lifting or strenuous activity for 48 hours. Drink plenty of fluids, unless you have otherwise been told to restrict your fluid intake. Do not drink alcohol for 24 hours. Do not drive,  operate heavy machinery, make important decisions or sign legal documents today. Medications:  Stop lovenox tonight. Start Xarelto start pack tonight. Contact Interventional Radiology at [phone] if you have severe/unrelieved pain, fever, chills, dizziness/lightheadedness, or drainage/bleeding from your incision site.     Follow up with oncologist for future doses of Xarelto

## 2023-01-10 NOTE — IVS NOTE
DISCHARGE NOTE     Pt is able to sit up and ambulate without difficulty. Pt voided and tolerated fluids and food. Procedural site remains dry and intact with good circulation, motion, and sensation. No signs and symptoms of bleeding/hematoma noted. IV access removed  Instruction provided, patient/family verbalizes understanding. Dr. Comfort Martinez spoke with patient/family post procedure. Pt discharge via wheelchair to [address]       Follow up Appointment: With Oncologist regarding furture xarelto. New Prescription: Stop lovenox. Start Xarelto starter pack. Coupon card given. Ketorolac RX sent.

## 2023-01-12 NOTE — INTERVAL H&P NOTE
The above referenced H&P was reviewed by Grady Essex, MD on 1/12/2023, the patient was examined and no significant changes have occurred in the patient's condition since the H&P was performed. Risks, benefits, alternative treatments and consequences of no treatment were discussed. We will proceed with procedure as planned.       Grady Essex, MD  1/12/2023  3:26 PM

## 2023-01-15 NOTE — CDS QUERY
Clarification - Condition Associated with Device, Implant or Graft  Manuel Baptiste  Dear: Provider  Clinical information (provided below) suggests involvement of a device, implant or graft. For accurate ICD-10-CM code assignment to reflect severity of illness and risk of mortality,  IS ACUTE DVT_OF AXILLARY VEIN RIGHT UPPER EXTREMITY, ACUTE DVT BRACHIAL VEIN RIGHT UPPER EXTREMITY A COMPLICATION OF PORT A CATH? SELECTION BY PROVIDER ONLY    [  ] Yes, it is a complication of the device, implant or graft. [  ] No, it is not a complication of the device, implant or graft. [  ] Other (please specify): ___________________________________________________________    [  ] Unable to Determine (please comment) ______________________________________________        RISK FACTORS:  Invasive Ductal Carcinoma w. axillary lymph node w. chemotherapy: Rt sided port a cath placement 1                                 month ago. CLINICAL INDICATORS:                                            US: Right upper extremity ultrasound reviewed complete right subclavian and right axillary vein thrombosis with partial right basilic and brachial vein thrombosis                                           ED: Presents w. evaluation of right arm swelling. Had a port placed a month ago. Today afternoon she noticed that her right arm was circumferentially swollen from the shoulder down to the hand and the skin appeared bluish purplish to her. Acute deep vein thrombosis (DVT) of axillary vein of right upper extremity. Acute deep vein thrombosis (DVT) of brachial vein of right upper extremity                                           HP/PN: Acute deep vein thrombosis (DVT) of axillary vein of right upper extremity                                            IR: 48y/o woman with left breast cancer s/p right subclavian vein approach chest port insertion who presented with acute right axillosubclavian vein thrombosis.   She is s/p chest port removal with right axillosubclavian vein thrombectomy performed on 1/5/23. IR: Procedure Report; Ultrasound reveals non-occlusive thrombus in basilic vein. Basilic vein then accessed, and 8Fr sheath placed. Venogram reveals occlusion of left axillary and subclavian veins. Thrombus traversed with wire. Existing right subclavian chest port removed.        TREATMENT: IR Consult, Heparin gtt, Thrombectomy, right sided port removal.         For questions regarding this query, please contact Clinical Documentation Integrity: BONI Wiley RN, CCDS:   [phone]                                          THIS FORM IS A PERMANENT PART OF THE MEDICAL RECORD  Revised 09/2015

## 2023-02-17 ENCOUNTER — HOSPITAL ENCOUNTER (OUTPATIENT)
Dept: CT IMAGING | Facility: HOSPITAL | Age: 48
Discharge: HOME | End: 2023-02-17
Attending: INTERNAL MEDICINE
Payer: COMMERCIAL

## 2023-02-17 DIAGNOSIS — R06.02 SHORTNESS OF BREATH: ICD-10-CM

## 2023-02-17 DIAGNOSIS — R05.9 COUGH: ICD-10-CM

## 2023-02-17 DIAGNOSIS — C50.312 MALIGNANT NEOPLASM OF LOWER-INNER QUADRANT OF LEFT FEMALE BREAST (HCC): ICD-10-CM

## 2023-02-17 PROCEDURE — 71250 CT THORAX DX C-: CPT | Performed by: INTERNAL MEDICINE

## 2023-04-29 ENCOUNTER — HOSPITAL ENCOUNTER (EMERGENCY)
Facility: HOSPITAL | Age: 48
Discharge: HOME | End: 2023-04-29
Attending: EMERGENCY MEDICINE
Payer: COMMERCIAL

## 2023-04-29 ENCOUNTER — APPOINTMENT (OUTPATIENT)
Dept: ULTRASOUND IMAGING | Facility: HOSPITAL | Age: 48
End: 2023-04-29
Attending: EMERGENCY MEDICINE
Payer: COMMERCIAL

## 2023-04-29 VITALS
TEMPERATURE: 99 F | WEIGHT: 286 LBS | HEIGHT: 68 IN | HEART RATE: 72 BPM | SYSTOLIC BLOOD PRESSURE: 123 MMHG | OXYGEN SATURATION: 99 % | DIASTOLIC BLOOD PRESSURE: 80 MMHG | RESPIRATION RATE: 18 BRPM | BODY MASS INDEX: 43.35 KG/M2

## 2023-04-29 DIAGNOSIS — S20.211A CHEST WALL HEMATOMA, RIGHT, INITIAL ENCOUNTER: ICD-10-CM

## 2023-04-29 DIAGNOSIS — M79.89 SWELLING OF RIGHT UPPER EXTREMITY: Primary | ICD-10-CM

## 2023-04-29 PROCEDURE — 99284 EMERGENCY DEPT VISIT MOD MDM: CPT

## 2023-04-29 PROCEDURE — 93971 EXTREMITY STUDY: CPT | Performed by: EMERGENCY MEDICINE

## 2023-04-29 RX ORDER — RIVAROXABAN 20 MG/1
20 TABLET, FILM COATED ORAL DAILY
COMMUNITY
Start: 2023-02-03

## 2023-04-29 NOTE — ED QUICK NOTES
Pt reports generalized pain at right arm; denies injury and still taking bld thinner. Additional blankets and pillow received = right arm elevated for comfort.

## 2023-04-29 NOTE — ED INITIAL ASSESSMENT (HPI)
Patient to the ED with c/o right upper arm pain since Tuesday. Hx of blood clot after her port was placed. Current port was placed January of 2023. First clot was in early January.

## 2023-04-30 NOTE — DISCHARGE INSTRUCTIONS
Elevate arm is much as possible. Follow-up with your primary care physician and oncologist if you have increasing swelling or discomfort return to the emergency for repeat ultrasound. Return if any chest pain or shortness of breath.

## 2023-06-06 ENCOUNTER — HOSPITAL ENCOUNTER (OUTPATIENT)
Dept: CT IMAGING | Facility: HOSPITAL | Age: 48
Discharge: HOME | End: 2023-06-06
Attending: INTERNAL MEDICINE
Payer: COMMERCIAL

## 2023-06-06 DIAGNOSIS — C50.312 MALIGNANT NEOPLASM OF LOWER-INNER QUADRANT OF LEFT FEMALE BREAST (HCC): ICD-10-CM

## 2023-06-06 LAB
CREAT BLD-MCNC: 0.6 MG/DL
GFR SERPLBLD BASED ON 1.73 SQ M-ARVRAT: 111 ML/MIN/1.73M2 (ref 60–?)

## 2023-06-06 PROCEDURE — 82565 ASSAY OF CREATININE: CPT

## 2023-06-06 PROCEDURE — 74160 CT ABDOMEN W/CONTRAST: CPT | Performed by: INTERNAL MEDICINE

## 2023-06-22 ENCOUNTER — HOSPITAL ENCOUNTER (OUTPATIENT)
Age: 48
Discharge: HOME | End: 2023-06-22
Payer: COMMERCIAL

## 2023-06-22 VITALS
OXYGEN SATURATION: 97 % | TEMPERATURE: 99 F | DIASTOLIC BLOOD PRESSURE: 116 MMHG | SYSTOLIC BLOOD PRESSURE: 149 MMHG | RESPIRATION RATE: 20 BRPM | HEART RATE: 70 BPM

## 2023-06-22 DIAGNOSIS — M62.830 LUMBAR PARASPINAL MUSCLE SPASM: Primary | ICD-10-CM

## 2023-06-22 PROCEDURE — 99213 OFFICE O/P EST LOW 20 MIN: CPT

## 2023-06-22 RX ORDER — TIZANIDINE 4 MG/1
4 TABLET ORAL 3 TIMES DAILY PRN
Qty: 20 TABLET | Refills: 0 | Status: SHIPPED | OUTPATIENT
Start: 2023-06-22

## 2023-06-22 RX ORDER — FAMOTIDINE 20 MG/1
20 TABLET, FILM COATED ORAL 2 TIMES DAILY
COMMUNITY

## 2023-06-23 NOTE — DISCHARGE INSTRUCTIONS
Alternate ice / heat as tolerated  Drink plenty of water (approx.  4-5 bottles per day)  Tylenol (or other) for pain as needed  Zanaflex for spasms as needed (may make your drowsy)  You may benefit from over-the-counter topical pain medication such as: Voltaren, Icy/Hot, Biofreeze, Bengay, etc.  You may benefit from massage  Avoid excessive twisting / bending / lifting  Avoid quick turns as this may worsen your pain  Expect symptoms to start improving over next few days

## 2023-08-25 ENCOUNTER — HOSPITAL ENCOUNTER (OUTPATIENT)
Dept: NUCLEAR MEDICINE | Facility: HOSPITAL | Age: 48
Discharge: HOME | End: 2023-08-25
Attending: SURGERY
Payer: COMMERCIAL

## 2023-08-25 DIAGNOSIS — C79.81 METASTATIC MALIGNANT NEOPLASM TO BREAST (HCC): ICD-10-CM

## 2023-08-25 LAB — GLUCOSE BLD-MCNC: 99 MG/DL (ref 70–99)

## 2023-08-25 PROCEDURE — 78815 PET IMAGE W/CT SKULL-THIGH: CPT | Performed by: SURGERY

## 2023-08-25 PROCEDURE — 82962 GLUCOSE BLOOD TEST: CPT

## 2023-12-08 ENCOUNTER — LAB REQUISITION (OUTPATIENT)
Dept: LAB | Facility: HOSPITAL | Age: 48
End: 2023-12-08
Payer: COMMERCIAL

## 2023-12-08 DIAGNOSIS — Z00.01 ENCOUNTER FOR GENERAL ADULT MEDICAL EXAMINATION WITH ABNORMAL FINDINGS: ICD-10-CM

## 2023-12-08 PROCEDURE — 88305 TISSUE EXAM BY PATHOLOGIST: CPT | Performed by: DENTIST

## 2024-02-06 ENCOUNTER — LAB REQUISITION (OUTPATIENT)
Dept: LAB | Facility: HOSPITAL | Age: 49
End: 2024-02-06
Payer: COMMERCIAL

## 2024-02-06 DIAGNOSIS — D48.5 NEOPLASM OF UNCERTAIN BEHAVIOR OF SKIN: ICD-10-CM

## 2024-02-06 PROCEDURE — 88305 TISSUE EXAM BY PATHOLOGIST: CPT | Performed by: DENTIST

## 2024-02-27 ENCOUNTER — HOSPITAL ENCOUNTER (OUTPATIENT)
Dept: ULTRASOUND IMAGING | Age: 49
Discharge: HOME | End: 2024-02-27
Attending: INTERNAL MEDICINE
Payer: COMMERCIAL

## 2024-02-27 DIAGNOSIS — M79.601 PAIN AND SWELLING OF UPPER EXTREMITY, RIGHT: ICD-10-CM

## 2024-02-27 DIAGNOSIS — M79.89 PAIN AND SWELLING OF UPPER EXTREMITY, RIGHT: ICD-10-CM

## 2024-02-27 PROCEDURE — 93971 EXTREMITY STUDY: CPT | Performed by: INTERNAL MEDICINE

## 2024-05-22 DIAGNOSIS — H53.30 UNSPECIFIED DISORDER OF BINOCULAR VISION: Primary | ICD-10-CM

## 2024-05-22 DIAGNOSIS — R20.9 DISTURBANCE OF SKIN SENSATION: ICD-10-CM

## 2024-05-22 DIAGNOSIS — R06.02 SHORTNESS OF BREATH: ICD-10-CM

## 2024-05-22 DIAGNOSIS — R21 SKIN RASH: ICD-10-CM

## 2024-05-22 DIAGNOSIS — C50.312 MALIGNANT NEOPLASM OF LOWER-INNER QUADRANT OF LEFT FEMALE BREAST  (CMD): ICD-10-CM

## 2024-05-23 ENCOUNTER — HOSPITAL ENCOUNTER (OUTPATIENT)
Dept: MRI IMAGING | Age: 49
Discharge: HOME | End: 2024-05-23
Attending: INTERNAL MEDICINE

## 2024-05-23 DIAGNOSIS — H53.30 UNSPECIFIED DISORDER OF BINOCULAR VISION: ICD-10-CM

## 2024-05-23 DIAGNOSIS — R20.9 DISTURBANCE OF SKIN SENSATION: ICD-10-CM

## 2024-05-23 DIAGNOSIS — C50.312 MALIGNANT NEOPLASM OF LOWER-INNER QUADRANT OF LEFT FEMALE BREAST  (CMD): ICD-10-CM

## 2024-05-23 PROCEDURE — A9577 INJ MULTIHANCE: HCPCS | Performed by: INTERNAL MEDICINE

## 2024-05-23 PROCEDURE — 10002805 HB CONTRAST AGENT: Performed by: INTERNAL MEDICINE

## 2024-05-23 PROCEDURE — 70553 MRI BRAIN STEM W/O & W/DYE: CPT

## 2024-05-23 RX ADMIN — GADOBENATE DIMEGLUMINE 20 ML: 529 INJECTION, SOLUTION INTRAVENOUS at 17:27

## 2024-05-30 ENCOUNTER — HOSPITAL ENCOUNTER (OUTPATIENT)
Dept: PET IMAGING | Age: 49
Discharge: HOME | End: 2024-05-30
Attending: INTERNAL MEDICINE

## 2024-05-30 DIAGNOSIS — R21 SKIN RASH: ICD-10-CM

## 2024-05-30 DIAGNOSIS — C50.312 MALIGNANT NEOPLASM OF LOWER-INNER QUADRANT OF LEFT FEMALE BREAST  (CMD): ICD-10-CM

## 2024-05-30 DIAGNOSIS — R06.02 SHORTNESS OF BREATH: ICD-10-CM

## 2024-05-30 LAB — GLUCOSE BLDC GLUCOMTR-MCNC: 131 MG/DL (ref 70–99)

## 2024-05-30 PROCEDURE — A9609 HB RX 343: HCPCS | Performed by: INTERNAL MEDICINE

## 2024-05-30 PROCEDURE — 78815 PET IMAGE W/CT SKULL-THIGH: CPT

## 2024-05-30 PROCEDURE — 10006150 HB RX 343: Performed by: INTERNAL MEDICINE

## 2024-05-30 PROCEDURE — 82962 GLUCOSE BLOOD TEST: CPT

## 2024-05-30 RX ADMIN — FLUDEOXYGLUCOSE F-18 11.3 MILLICURIE: 300 INJECTION INTRAVENOUS at 08:32

## 2024-07-05 ENCOUNTER — OFFICE VISIT (OUTPATIENT)
Dept: WOUND CARE | Facility: HOSPITAL | Age: 49
End: 2024-07-05
Attending: INTERNAL MEDICINE
Payer: COMMERCIAL

## 2024-07-05 VITALS
DIASTOLIC BLOOD PRESSURE: 63 MMHG | RESPIRATION RATE: 16 BRPM | WEIGHT: 285 LBS | HEIGHT: 68 IN | SYSTOLIC BLOOD PRESSURE: 112 MMHG | HEART RATE: 71 BPM | TEMPERATURE: 98 F | BODY MASS INDEX: 43.19 KG/M2

## 2024-07-05 DIAGNOSIS — C79.9 METASTASIS FROM BREAST CANCER (HCC): ICD-10-CM

## 2024-07-05 DIAGNOSIS — C50.919 METASTASIS FROM BREAST CANCER (HCC): ICD-10-CM

## 2024-07-05 DIAGNOSIS — R60.0 LOCALIZED EDEMA: ICD-10-CM

## 2024-07-05 DIAGNOSIS — S21.002A OPEN WOUND OF LEFT BREAST, INITIAL ENCOUNTER: Primary | ICD-10-CM

## 2024-07-05 DIAGNOSIS — R23.8 SLOUGHING OF WOUND: ICD-10-CM

## 2024-07-05 PROCEDURE — 99214 OFFICE O/P EST MOD 30 MIN: CPT

## 2024-07-05 RX ORDER — SODIUM HYPOCHLORITE 1.25 MG/ML
SOLUTION TOPICAL
Qty: 1000 ML | Refills: 3 | Status: SHIPPED | OUTPATIENT
Start: 2024-07-05

## 2024-07-05 NOTE — PROGRESS NOTES
[facility] CONSULTATION NOTE  GERSON CHRISTIAN MD  2024    Subjective   Yesenia Daniels is a 49 year old female.    Chief Complaint   Patient presents with    Wound Care     Initial visit for left breast wound. Pt first noticed wound draining around may. Has been dressing with bleached gauze and other dressings.      HPI    50 yo CF here for eval and management of open wound left breast - they opened up in 2024.   She has h/o met. Breast cancer - brain liver lungs etc - has undergone lumpectomy, chemo, XRT. She has had recurrence and hence started on palliative chemotherapy - She is here for wound management of draining left breast ulcers related to her inflammatory breast cancer.   She has  biopsy proven malignancy in the wound bed - per pt.   We are  awaiting oncology treatment records    Significant swelling and  discoloration over left breast with skin break down and slough and bleeding.       Diabetes status: no    Smoker status: no    Past Medical history, Surgical history, Social history, Family history reviewed with patient.   Medications reviewed.   Epic chart notes including provider notes, labs, imaging etc. Reviewed.   [facility] care everywhere queried and results reviewed.     Past Medical Hx:  Past Medical History:    Arrhythmia    Asthma (HCC)    Back problem    Breast cancer (HCC)    Cancer (HCC)    Shortness of breath    Visual impairment     Past Surgical Hx:  Past Surgical History:   Procedure Laterality Date    Cholecystectomy      Endometrial ablation            x5    Removal gallbladder       Problem List:  Patient Active Problem List   Diagnosis    Atrial fibrillation with rapid ventricular response (HCC)    Epigastric abdominal pain    Hyponatremia    Leukocytosis    Chest pressure    Acute deep vein thrombosis (DVT) of axillary vein of right upper extremity (HCC)    Acute deep vein thrombosis (DVT) of brachial vein of right upper extremity (HCC)     Social  History:  Social History     Socioeconomic History    Marital status:    Tobacco Use    Smoking status: Never    Smokeless tobacco: Never   Vaping Use    Vaping status: Never Used   Substance and Sexual Activity    Alcohol use: No     Alcohol/week: 0.0 standard drinks of alcohol    Drug use: No     Social Determinants of Health     Financial Resource Strain: Low Risk  (12/14/2022)    [facility]    Overall Financial Resource Strain (CARDIA)     Difficulty of Paying Living Expenses: Not hard at all   Food Insecurity: No Food Insecurity (12/14/2022)    [facility]    Hunger Vital Sign     Worried About Running Out of Food in the Last Year: Never true     Ran Out of Food in the Last Year: Never true   Transportation Needs: No Transportation Needs (12/14/2022)    [facility]    PRAPARE - Transportation     Lack of Transportation (Medical): No     Lack of Transportation (Non-Medical): No   Physical Activity: Insufficiently Active (12/14/2022)    [facility]    Exercise Vital Sign     Days of Exercise per Week: 1 day     Minutes of Exercise per Session: 30 min   Stress: No Stress Concern Present (12/14/2022)    [facility]    [employer] [place] [employer] - Occupational Stress Questionnaire     Feeling of Stress : Not at all    Received from [facility]     Family History:  Family History   Problem Relation Age of Onset    Heart Disorder Father     Cancer Father     Heart Disorder Mother     Heart Disorder Maternal Grandmother     Heart Disorder Maternal Grandfather     Heart Disorder Paternal Grandmother     Heart Disorder Sister      Allergies:  Allergies   Allergen  Reactions    Antihistamine & Nasal Deconges [Fexofenadine-Pseudoephedrine] PALPITATIONS    Benadryl [Diphenhydramine] PALPITATIONS and SHORTNESS OF BREATH    Maalox Advanced Max St [Antacid] PALPITATIONS and SHORTNESS OF BREATH    Morphine ANXIETY     Tolerates hydrocodone    Sudafed [Pseudoephedrine] PALPITATIONS and SHORTNESS OF BREATH    Betadine [Povidone Iodine] RASH    Erythromycin NAUSEA AND VOMITING    Latex RASH    Other OTHER (SEE COMMENTS)     Morphine makes her loopy,benedryl as well    Vitamin E RASH     Current Meds:  Current Outpatient Medications   Medication Sig Dispense Refill    sodium hypochlorite 0.125 % External Solution Apply on affected areas daily. 1000 mL 3    tiZANidine 4 MG Oral Tab Take 1 tablet (4 mg total) by mouth 3 (three) times daily as needed (spasms). 20 tablet 0    famotidine 20 MG Oral Tab Take 1 tablet (20 mg total) by mouth 2 (two) times daily.      XARELTO 20 MG Oral Tab Take 1 tablet (20 mg total) by mouth daily.      Ketorolac Tromethamine 10 MG Oral Tab Take 1 tablet (10 mg total) by mouth every 6 (six) hours as needed for Pain. 20 tablet 0    rivaroxaban 15 & 20 MG Oral Tablet Therapy Pack Take As Directed based on package instructions: Days 1-21: 15 mg by mouth twice daily Days 22-30: 20 mg by mouth once daily 1 each 0    oxyCODONE-acetaminophen 5-325 MG Oral Tab Take 1-2 tablets by mouth every 6 (six) hours as needed for Pain. 20 tablet 0    cetirizine 10 MG Oral Tab Take 1 tablet (10 mg total) by mouth daily.      omeprazole 20 MG Oral Capsule Delayed Release Take 1 capsule (20 mg total) by mouth at bedtime.      dilTIAZem HCl ER Coated Beads 240 MG Oral Capsule SR 24 Hr Take 1 capsule (240 mg total) by mouth daily. (Patient taking differently: Take 1 capsule (240 mg total) by mouth at bedtime.) 30 capsule 1     Tobacco Counseling:  Counseling given: Not Answered       REVIEW OF SYSTEMS:   CONSTITUTIONAL:  Denies unusual weight gain/loss, fever, chills, or  fatigue.  EENT:  Eyes:  Denies eye pain, visual loss, blurred vision, double vision or yellow sclerae.   CARDIOVASCULAR:  Denies chest pain, chest pressure, chest discomfort, palpitations, dyspnea on exertion or at rest.  RESPIRATORY:  Denies shortness of breath, wheezing, cough or sputum.  GASTROINTESTINAL:  Denies abdominal pain, nausea, vomiting, constipation, diarrhea, or blood in stool.  MUSCULOSKELETAL:  Denies weakness  NEUROLOGICAL:  Denies headache, seizures, dizziness, syncope      Objective   Objective  Physical Exam    Wound Assessment  Wound 01/05/23 Incision Chest Right;Upper (Active)       Wound 01/10/23 Incision Chest Right;Upper (Active)       Wound 01/10/23 Chest Right;Upper (Active)       Wound 07/05/24 #1 Left Breast Breast Left;Lower (Active)   Wound Image   07/05/24 0959   Drainage Amount Scant 07/05/24 0959   Drainage Description Serous;Yellow 07/05/24 0959   Wound Length (cm) 9.5 cm 07/05/24 0959   Wound Width (cm) 14.5 cm 07/05/24 0959   Wound Surface Area (cm^2) 137.75 cm^2 07/05/24 0959   Wound Depth (cm) 0.1 cm 07/05/24 0959   Wound Volume (cm^3) 13.775 cm^3 07/05/24 0959   Margins Well-defined edges 07/05/24 0959   Non-staged Wound Description Full thickness 07/05/24 0959   Patricia-wound Assessment Edema;Red;Dry 07/05/24 0959   Wound Granulation Tissue Spongy;Pink 07/05/24 0959   Wound Bed Granulation (%) 10 % 07/05/24 0959   Wound Bed Epithelium (%) 75 % 07/05/24 0959   Wound Bed Slough (%) 15 % 07/05/24 0959   Wound Odor None 07/05/24 0959   Shape Clustered 07/05/24 0959          PHYSICAL EXAM:   /63   Pulse 71   Temp 97.5 °F (36.4 °C)   Resp 16   Ht 68\"   Wt 285 lb (129.3 kg)   BMI 43.33 kg/m²  Estimated body mass index is 43.33 kg/m² as calculated from the following:    Height as of this encounter: 68\".    Weight as of this encounter: 285 lb (129.3 kg).   Vital signs reviewed.Appears stated age, well groomed.  Physical Exam:  GEN:  Patient is alert, awake and oriented, well  developed, well nourished, no apparent distress.      Assessment   Assessment    Encounter Diagnosis  1. Open wound of left breast, initial encounter    2. Metastasis from breast cancer (HCC)    3. Sloughing of wound    4. Localized edema        Problem List  Patient Active Problem List   Diagnosis    Atrial fibrillation with rapid ventricular response (HCC)    Epigastric abdominal pain    Hyponatremia    Leukocytosis    Chest pressure    Acute deep vein thrombosis (DVT) of axillary vein of right upper extremity (HCC)    Acute deep vein thrombosis (DVT) of brachial vein of right upper extremity (HCC)       Plan    Get records from oncologist.   Start dakins soak   Start absorptive dressings.   W/o s/o infection.   Extensive discussion about plan of care  Return 4 weeks / sooner PRN    PROCEDURES:     Mechanical debridement of wound bed with saline soaked gauze - removed slough - good bleeding response.     Patient Instructions     Wound Cleaning and Dressings:    Wash your hands with soap and water. Always wear gloves while changing dressings. Donot touch wound / chele-wound skin with un-gloved hands. Remove old dressing, discard and place into trash.      DRESSINGS: dakins soaked gauze / abd pad OR kerramax OR Eclipse dressing  Change dressing daily.    Miscellaneous Instructions:  Supplement with a daily multivitamin   Increase protein intake / consider protein supplements - see below    DIETARY MODIFICATIONS TO HELP WITH WOUND HEALING:    Protein: Meats, beans, eggs, milk and yogurt particularly Greek yogurt), tofu, soy nuts, soy protein products    Vitamin C: Citrus fruits and juices, strawberries, tomatoes, tomato juice, peppers, baked potatoes, spinach, broccoli, cauliflower, [place] sprouts, cabbage    Vitamin A: Dark green, leafy vegetables, orange or yellow vegetables, cantaloupe, fortified dairy products, liver, fortified cereals    Zinc: Fortified cereals, red meats, seafood    Consider Johnie by [employer]  labs (These are essential branch chain amino acids that help with tissue building and wound healing) and take 2 packets/day. you can order online at [employer] or [employer]    ADDITIONAL REMINDERS:    The treatment plan has been discussed at length with you and your provider. Follow all instructions carefully, it is very important. If you do not follow all instructions, you are at  risk of your wound not healing, infection, possible loss of limb and even end of life.  Please call the clinic during regular business hours ( 7:30 AM - 5:30 PM) if you notice increased bleeding, redness, warmth, pain or pus like drainage or start running a fever greater than 100.3.    For after hour emergencies, please call your primary physician or go to the nearest emergency room.        Meds & Refills for this Visit:  Requested Prescriptions     Signed Prescriptions Disp Refills    sodium hypochlorite 0.125 % External Solution 1000 mL 3     Sig: Apply on affected areas daily.         Patient/Caregiver Education: There are no barriers to learning. Medical education for above diagnosis given.   Answered all questions.    Outcome: Patient verbalizes understanding. Patient is notified to call with any questions, complications, allergies, or worsening or changing symptoms.  Patient is to call with any side effects or complications as a result of the treatments today.      DOCUMENTATION OF TIME SPENT: Code selection for this visit was based on time spent : 60 min on date of service in preparing to see the patient, obtaining and/or reviewing separately obtained history, performing a medically appropriate examination, counseling and educating the patient/family/caregiver, ordering medications or testing, referring and communicating with other healthcare providers, documenting clinical information in the E HR, independently interpreting results and communicating results to the patient/family/caregiver and care coordination with the patient's other  providers.    Followup: Return in about 4 weeks (around 8/2/2024) for Wound followup.      Note to Patient:  The 21st Century Cures Act makes medical notes like these available to patients in the interest of transparency. However, be advised this is a medical document and is intended as pdes-be-ualv communication; it is written in medical language and may appear blunt, direct, or contain abbreviations or verbiage that are unfamiliar. Medical documents are intended to carry relevant information, facts as evident, and the clinical opinion of the practitioner.    Also, please note that this report has been produced using speech recognition software and may contain errors related to that system including, but not limited to, errors in grammar, punctuation, and spelling, as well as words and phrases that possibly may have been recognized inappropriately.  If there are any questions or concerns, contact the dictating provider for clarification.      Pepper Smith MD  7/5/2024  10:21 AM

## 2024-07-05 NOTE — PROGRESS NOTES
Weekly Wound Education Note    Teaching Provided To: Patient  Training Topics: Cleasing and general instructions;Discharge instructions;Dressing  Training Method: Explain/Verbal  Training Response: Patient responds and understands;Reinforcement needed        Notes: Initial visit: to left breast - hx of cancer to area and mets. Daksins solution ordered today. Vashe soaked gauze applied to sloughy/open areas and covered with ABD pads, held on with bra. Supplied ordered from [employer]. Informed patient that maxi pads or baby diapers can also be used as an absorbptive dressing.

## 2024-07-05 NOTE — PATIENT INSTRUCTIONS
Wound Cleaning and Dressings:    Wash your hands with soap and water. Always wear gloves while changing dressings. Donot touch wound / chele-wound skin with un-gloved hands. Remove old dressing, discard and place into trash.      DRESSINGS: dakins soaked gauze / abd pad OR kerramax OR Eclipse dressing  Change dressing daily.    Miscellaneous Instructions:  Supplement with a daily multivitamin   Increase protein intake / consider protein supplements - see below    DIETARY MODIFICATIONS TO HELP WITH WOUND HEALING:    Protein: Meats, beans, eggs, milk and yogurt particularly Greek yogurt), tofu, soy nuts, soy protein products    Vitamin C: Citrus fruits and juices, strawberries, tomatoes, tomato juice, peppers, baked potatoes, spinach, broccoli, cauliflower, [place] sprouts, cabbage    Vitamin A: Dark green, leafy vegetables, orange or yellow vegetables, cantaloupe, fortified dairy products, liver, fortified cereals    Zinc: Fortified cereals, red meats, seafood    Consider Johnie by [employer] (These are essential branch chain amino acids that help with tissue building and wound healing) and take 2 packets/day. you can order online at [employer] or [employer]    ADDITIONAL REMINDERS:    The treatment plan has been discussed at length with you and your provider. Follow all instructions carefully, it is very important. If you do not follow all instructions, you are at  risk of your wound not healing, infection, possible loss of limb and even end of life.  Please call the clinic during regular business hours ( 7:30 AM - 5:30 PM) if you notice increased bleeding, redness, warmth, pain or pus like drainage or start running a fever greater than 100.3.    For after hour emergencies, please call your primary physician or go to the nearest emergency room.

## 2024-07-09 ENCOUNTER — TELEPHONE (OUTPATIENT)
Dept: WOUND CARE | Facility: HOSPITAL | Age: 49
End: 2024-07-09

## 2024-07-09 NOTE — TELEPHONE ENCOUNTER
Returning patient's call - she states she is unhappy with her [place] order. She spoke to them, and gave RN list of items she wanted ordered that are over by insurance: ABD pads, eclypse dressings, and saline. She also states she has not gotten the daksins - confirmed that rx was sent to preferred pharmacy on file. She is also asking if provider can order silvadene cream, informed her that provider ordered dakins to be applied to wound.   Patient is aware but states it feels a bit uncomfortable and would like to see if an ointment/cream can be ordered?   Order faxed to [facility] as requested by patient and pharmacy call in regards to dakins solution.

## 2024-07-11 ENCOUNTER — APPOINTMENT (OUTPATIENT)
Dept: WOUND CARE | Facility: HOSPITAL | Age: 49
End: 2024-07-11
Attending: NURSE PRACTITIONER
Payer: COMMERCIAL

## 2024-07-31 ENCOUNTER — TELEPHONE (OUTPATIENT)
Dept: WOUND CARE | Facility: HOSPITAL | Age: 49
End: 2024-07-31

## 2024-08-02 ENCOUNTER — APPOINTMENT (OUTPATIENT)
Dept: WOUND CARE | Facility: HOSPITAL | Age: 49
End: 2024-08-02
Payer: COMMERCIAL

## 2024-08-15 DIAGNOSIS — C79.2 SECONDARY MALIGNANT NEOPLASM OF SKIN  (CMD): Primary | ICD-10-CM

## 2024-08-16 ENCOUNTER — APPOINTMENT (OUTPATIENT)
Dept: WOUND CARE | Facility: HOSPITAL | Age: 49
End: 2024-08-16
Payer: COMMERCIAL

## 2024-08-19 ENCOUNTER — OFFICE VISIT (OUTPATIENT)
Dept: WOUND CARE | Facility: HOSPITAL | Age: 49
End: 2024-08-19
Attending: INTERNAL MEDICINE
Payer: COMMERCIAL

## 2024-08-19 VITALS
HEART RATE: 67 BPM | SYSTOLIC BLOOD PRESSURE: 104 MMHG | TEMPERATURE: 98 F | RESPIRATION RATE: 16 BRPM | DIASTOLIC BLOOD PRESSURE: 76 MMHG

## 2024-08-19 DIAGNOSIS — R21 RASH: ICD-10-CM

## 2024-08-19 DIAGNOSIS — C79.9 METASTASIS FROM BREAST CANCER (HCC): ICD-10-CM

## 2024-08-19 DIAGNOSIS — C50.919 METASTASIS FROM BREAST CANCER (HCC): ICD-10-CM

## 2024-08-19 DIAGNOSIS — R23.8 SLOUGHING OF WOUND: ICD-10-CM

## 2024-08-19 DIAGNOSIS — T78.40XA ALLERGIC REACTION, INITIAL ENCOUNTER: ICD-10-CM

## 2024-08-19 DIAGNOSIS — R60.0 LOCALIZED EDEMA: ICD-10-CM

## 2024-08-19 DIAGNOSIS — S21.002D OPEN WOUND OF LEFT BREAST, SUBSEQUENT ENCOUNTER: Primary | ICD-10-CM

## 2024-08-19 PROBLEM — S21.002A OPEN WOUND OF LEFT BREAST: Status: ACTIVE | Noted: 2024-08-19

## 2024-08-19 PROCEDURE — 99214 OFFICE O/P EST MOD 30 MIN: CPT

## 2024-08-19 RX ORDER — BETAMETHASONE DIPROPIONATE 0.5 MG/G
1 CREAM TOPICAL 2 TIMES DAILY
Qty: 50 G | Refills: 1 | Status: SHIPPED | OUTPATIENT
Start: 2024-08-19

## 2024-08-19 NOTE — PATIENT INSTRUCTIONS
Wound Cleaning and Dressings:    Wash your hands with soap and water. Always wear gloves while changing dressings. Donot touch wound / chele-wound skin with un-gloved hands. Remove old dressing, discard and place into trash.      DRESSINGS: dakins / VASHE soaked gauze / abd pad OR kerramax OR Eclipse dressing  Change dressing daily.    Miscellaneous Instructions:  Supplement with a daily multivitamin   Increase protein intake / consider protein supplements - see below    DIETARY MODIFICATIONS TO HELP WITH WOUND HEALING:    Protein: Meats, beans, eggs, milk and yogurt particularly Greek yogurt), tofu, soy nuts, soy protein products    Vitamin C: Citrus fruits and juices, strawberries, tomatoes, tomato juice, peppers, baked potatoes, spinach, broccoli, cauliflower, [place] sprouts, cabbage    Vitamin A: Dark green, leafy vegetables, orange or yellow vegetables, cantaloupe, fortified dairy products, liver, fortified cereals    Zinc: Fortified cereals, red meats, seafood    Consider Johnie by [employer] (These are essential branch chain amino acids that help with tissue building and wound healing) and take 2 packets/day. you can order online at [employer] or [employer]    ADDITIONAL REMINDERS:    The treatment plan has been discussed at length with you and your provider. Follow all instructions carefully, it is very important. If you do not follow all instructions, you are at  risk of your wound not healing, infection, possible loss of limb and even end of life.  Please call the clinic during regular business hours ( 7:30 AM - 5:30 PM) if you notice increased bleeding, redness, warmth, pain or pus like drainage or start running a fever greater than 100.3.    For after hour emergencies, please call your primary physician or go to the nearest emergency room.

## 2024-08-19 NOTE — PROGRESS NOTES
.Weekly Wound Education Note    Teaching Provided To: Patient;Family  Training Topics: Dressing;Discharge instructions;Cleasing and general instructions  Training Method: Explain/Verbal;Written  Training Response: Patient responds and understands            Silvadene cream to open areas, may cover with vaseline gauze, abd pads.  Supplies ordered this visit.

## 2024-08-19 NOTE — PROGRESS NOTES
[facility] PROGRESS NOTE  GERSON CHRISTIAN MD  8/19/2024    Chief Complaint:   Chief Complaint   Patient presents with    Wound Care     Patient is here for a wound care follow up. She complains of pain that is 10/10. She states that it has gotten progressively worse over the past week.        HPI:   Subjective   Yesenia Daniels is a 49 year old female coming in for a follow-up visit.    HPI    Wound has deteriorated significantly. Increased size - malignant appearing tissue.   She has increased pain which is not well controlled  New rash on back which is v itchy and she thinks is from her chemotherapy.   Would like med for the same.     Review of Systems  Negative except HPI   Denies chest pain / SOB / palpitations  Denies fever.     Allergies  Allergies   Allergen Reactions    Antihistamine & Nasal Deconges [Fexofenadine-Pseudoephedrine] PALPITATIONS    Benadryl [Diphenhydramine] PALPITATIONS and SHORTNESS OF BREATH    Maalox Advanced Max St [Antacid] PALPITATIONS and SHORTNESS OF BREATH    Morphine ANXIETY     Tolerates hydrocodone    Sudafed [Pseudoephedrine] PALPITATIONS and SHORTNESS OF BREATH    Betadine [Povidone Iodine] RASH    Erythromycin NAUSEA AND VOMITING    Latex RASH    Other OTHER (SEE COMMENTS)     Morphine makes her loopy,benedryl as well    Vitamin E RASH       Current Meds:  Current Outpatient Medications   Medication Sig Dispense Refill    Betamethasone Dipropionate Aug (DIPROLENE AF) 0.05 % External Cream Apply 1 Application topically 2 (two) times daily. 50 g 1    sodium hypochlorite 0.125 % External Solution Apply on affected areas daily. 1000 mL 3    tiZANidine 4 MG Oral Tab Take 1 tablet (4 mg total) by mouth 3 (three) times daily as needed (spasms). 20 tablet 0    famotidine 20 MG Oral Tab Take 1 tablet (20 mg total) by mouth 2 (two) times daily.      XARELTO 20 MG Oral Tab Take 1 tablet (20 mg total) by mouth daily.      Ketorolac Tromethamine 10 MG Oral Tab Take 1 tablet  (10 mg total) by mouth every 6 (six) hours as needed for Pain. 20 tablet 0    rivaroxaban 15 & 20 MG Oral Tablet Therapy Pack Take As Directed based on package instructions: Days 1-21: 15 mg by mouth twice daily Days 22-30: 20 mg by mouth once daily 1 each 0    oxyCODONE-acetaminophen 5-325 MG Oral Tab Take 1-2 tablets by mouth every 6 (six) hours as needed for Pain. 20 tablet 0    cetirizine 10 MG Oral Tab Take 1 tablet (10 mg total) by mouth daily.      omeprazole 20 MG Oral Capsule Delayed Release Take 1 capsule (20 mg total) by mouth at bedtime.      dilTIAZem HCl ER Coated Beads 240 MG Oral Capsule SR 24 Hr Take 1 capsule (240 mg total) by mouth daily. (Patient taking differently: Take 1 capsule (240 mg total) by mouth at bedtime.) 30 capsule 1         EXAM:   Objective   Objective    Physical Exam    Vital Signs  Vitals:    08/19/24 1403   BP: 104/76   Pulse:    Resp:    Temp:        Wound Assessment  Wound 01/05/23 Incision Chest Right;Upper (Active)       Wound 01/10/23 Incision Chest Right;Upper (Active)       Wound 01/10/23 Chest Right;Upper (Active)       Wound 07/05/24 #1 Left Breast Breast Left;Lower (Active)   Wound Image   08/19/24 1359   Drainage Amount Moderate 08/19/24 1359   Drainage Description Serous;Yellow 08/19/24 1359   Wound Length (cm) 26 cm 08/19/24 1359   Wound Width (cm) 28.5 cm 08/19/24 1359   Wound Surface Area (cm^2) 741 cm^2 08/19/24 1359   Wound Depth (cm) 0.2 cm 08/19/24 1359   Wound Volume (cm^3) 148.2 cm^3 08/19/24 1359   Wound Healing % -976 08/19/24 1359   Margins Well-defined edges 08/19/24 1359   Non-staged Wound Description Full thickness 08/19/24 1359   Patricia-wound Assessment Edema;Red 08/19/24 1359   Wound Granulation Tissue Spongy;Pink 08/19/24 1359   Wound Bed Granulation (%) 75 % 08/19/24 1359   Wound Bed Epithelium (%) 75 % 07/05/24 0959   Wound Bed Slough (%) 25 % 08/19/24 1359   Wound Odor None 08/19/24 1359   Shape Clustered 07/05/24 0959   Tunneling? No 08/19/24  1359   Undermining? No 08/19/24 1359   Sinus Tracts? No 08/19/24 1359           ASSESSMENT AND PLAN:     Assessment     Encounter Diagnosis  1. Open wound of left breast, subsequent encounter    2. Rash    3. Allergic reaction, initial encounter    4. Metastasis from breast cancer (HCC)    5. Sloughing of wound    6. Localized edema      PLAN OF CARE:    Continu dakins / vashe / absorptive dressings  Will order dressings.   Trial of betamethasone cream for allergic rash.   Watch out for signs of early infection - counseled.   Plan of care discussed with patient in detail - All questions answered   Return in 6 week.       Meds & Refills for this Visit:  Requested Prescriptions     Signed Prescriptions Disp Refills    Betamethasone Dipropionate Aug (DIPROLENE AF) 0.05 % External Cream 50 g 1     Sig: Apply 1 Application topically 2 (two) times daily.         Patient Instructions     Wound Cleaning and Dressings:    Wash your hands with soap and water. Always wear gloves while changing dressings. Donot touch wound / chele-wound skin with un-gloved hands. Remove old dressing, discard and place into trash.      DRESSINGS: dakins / VASHE soaked gauze / abd pad OR kerramax OR Eclipse dressing  Change dressing daily.    Miscellaneous Instructions:  Supplement with a daily multivitamin   Increase protein intake / consider protein supplements - see below    DIETARY MODIFICATIONS TO HELP WITH WOUND HEALING:    Protein: Meats, beans, eggs, milk and yogurt particularly Greek yogurt), tofu, soy nuts, soy protein products    Vitamin C: Citrus fruits and juices, strawberries, tomatoes, tomato juice, peppers, baked potatoes, spinach, broccoli, cauliflower, [place] sprouts, cabbage    Vitamin A: Dark green, leafy vegetables, orange or yellow vegetables, cantaloupe, fortified dairy products, liver, fortified cereals    Zinc: Fortified cereals, red meats, seafood    Consider Johnie by [employer] (These are essential branch chain amino  acids that help with tissue building and wound healing) and take 2 packets/day. you can order online at [employer] or [employer]    ADDITIONAL REMINDERS:    The treatment plan has been discussed at length with you and your provider. Follow all instructions carefully, it is very important. If you do not follow all instructions, you are at  risk of your wound not healing, infection, possible loss of limb and even end of life.  Please call the clinic during regular business hours ( 7:30 AM - 5:30 PM) if you notice increased bleeding, redness, warmth, pain or pus like drainage or start running a fever greater than 100.3.    For after hour emergencies, please call your primary physician or go to the nearest emergency room.        Patient/Caregiver Education: There are no barriers to learning. Medical education for above diagnosis given.   Answered all questions.    Outcome: Patient verbalizes understanding. Patient is notified to call with any questions, complications, allergies, or worsening or changing symptoms.  Patient is to call with any side effects or complications as a result of the treatments today.      DOCUMENTATION OF TIME SPENT: Code selection for this visit was based on time spent : 30 min on date of service in preparing to see the patient, obtaining and/or reviewing separately obtained history, performing a medically appropriate examination, counseling and educating the patient/family/caregiver, ordering medications or testing, referring and communicating with other healthcare providers, documenting clinical information in the E HR, independently interpreting results and communicating results to the patient/family/caregiver and care coordination with the patient's other providers.    Followup: Return in about 6 weeks (around 9/30/2024) for Wound followup.      Note to Patient:  The 21st Century Cures Act makes medical notes like these available to patients in the interest of transparency. However, be advised this  is a medical document and is intended as zrhy-sc-rpig communication; it is written in medical language and may appear blunt, direct, or contain abbreviations or verbiage that are unfamiliar. Medical documents are intended to carry relevant information, facts as evident, and the clinical opinion of the practitioner.    Also, please note that this report has been produced using speech recognition software and may contain errors related to that system including, but not limited to, errors in grammar, punctuation, and spelling, as well as words and phrases that possibly may have been recognized inappropriately.  If there are any questions or concerns, contact the dictating provider for clarification.      Pepper Smith MD  8/19/2024  2:29 PM

## 2024-08-23 ENCOUNTER — APPOINTMENT (OUTPATIENT)
Dept: PET IMAGING | Age: 49
End: 2024-08-23
Attending: INTERNAL MEDICINE

## 2024-08-31 ENCOUNTER — APPOINTMENT (OUTPATIENT)
Dept: GENERAL RADIOLOGY | Facility: HOSPITAL | Age: 49
End: 2024-08-31
Attending: EMERGENCY MEDICINE
Payer: COMMERCIAL

## 2024-08-31 ENCOUNTER — APPOINTMENT (OUTPATIENT)
Dept: CT IMAGING | Facility: HOSPITAL | Age: 49
End: 2024-08-31
Attending: EMERGENCY MEDICINE
Payer: COMMERCIAL

## 2024-08-31 ENCOUNTER — HOSPITAL ENCOUNTER (INPATIENT)
Facility: HOSPITAL | Age: 49
LOS: 5 days | Discharge: HOSPICE | End: 2024-09-05
Attending: EMERGENCY MEDICINE | Admitting: STUDENT IN AN ORGANIZED HEALTH CARE EDUCATION/TRAINING PROGRAM
Payer: COMMERCIAL

## 2024-08-31 DIAGNOSIS — I48.91 ATRIAL FIBRILLATION WITH RVR (HCC): Primary | ICD-10-CM

## 2024-08-31 DIAGNOSIS — G47.30 SLEEP APNEA, UNSPECIFIED TYPE: ICD-10-CM

## 2024-08-31 DIAGNOSIS — J18.9 COMMUNITY ACQUIRED PNEUMONIA, UNSPECIFIED LATERALITY: ICD-10-CM

## 2024-08-31 DIAGNOSIS — D64.9 ANEMIA, UNSPECIFIED TYPE: ICD-10-CM

## 2024-08-31 DIAGNOSIS — Z85.3 HISTORY OF BREAST CANCER: ICD-10-CM

## 2024-08-31 LAB
ALBUMIN SERPL-MCNC: 3.6 G/DL (ref 3.2–4.8)
ALBUMIN/GLOB SERPL: 1.6 {RATIO} (ref 1–2)
ALP LIVER SERPL-CCNC: 64 U/L
ALT SERPL-CCNC: 23 U/L
ANION GAP SERPL CALC-SCNC: 10 MMOL/L (ref 0–18)
AST SERPL-CCNC: 19 U/L (ref ?–34)
BASOPHILS # BLD AUTO: 0.01 X10(3) UL (ref 0–0.2)
BASOPHILS NFR BLD AUTO: 0.2 %
BILIRUB SERPL-MCNC: 0.7 MG/DL (ref 0.3–1.2)
BUN BLD-MCNC: 14 MG/DL (ref 9–23)
CALCIUM BLD-MCNC: 9.2 MG/DL (ref 8.7–10.4)
CHLORIDE SERPL-SCNC: 107 MMOL/L (ref 98–112)
CO2 SERPL-SCNC: 24 MMOL/L (ref 21–32)
CREAT BLD-MCNC: 0.56 MG/DL
DEPRECATED HBV CORE AB SER IA-ACNC: 270.9 NG/ML
EGFRCR SERPLBLD CKD-EPI 2021: 112 ML/MIN/1.73M2 (ref 60–?)
EOSINOPHIL # BLD AUTO: 0.01 X10(3) UL (ref 0–0.7)
EOSINOPHIL NFR BLD AUTO: 0.2 %
ERYTHROCYTE [DISTWIDTH] IN BLOOD BY AUTOMATED COUNT: 21.3 %
FLUAV + FLUBV RNA SPEC NAA+PROBE: NEGATIVE
FLUAV + FLUBV RNA SPEC NAA+PROBE: NEGATIVE
GLOBULIN PLAS-MCNC: 2.3 G/DL (ref 2–3.5)
GLUCOSE BLD-MCNC: 92 MG/DL (ref 70–99)
HCT VFR BLD AUTO: 26.2 %
HGB BLD-MCNC: 8.3 G/DL
IMM GRANULOCYTES # BLD AUTO: 0.05 X10(3) UL (ref 0–1)
IMM GRANULOCYTES NFR BLD: 1.2 %
IRON SATN MFR SERPL: 40 %
IRON SERPL-MCNC: 95 UG/DL
LACTATE SERPL-SCNC: 2 MMOL/L (ref 0.5–2)
LACTATE SERPL-SCNC: 3 MMOL/L (ref 0.5–2)
LYMPHOCYTES # BLD AUTO: 0.58 X10(3) UL (ref 1–4)
LYMPHOCYTES NFR BLD AUTO: 13.6 %
MCH RBC QN AUTO: 31.3 PG (ref 26–34)
MCHC RBC AUTO-ENTMCNC: 31.7 G/DL (ref 31–37)
MCV RBC AUTO: 98.9 FL
MONOCYTES # BLD AUTO: 0.73 X10(3) UL (ref 0.1–1)
MONOCYTES NFR BLD AUTO: 17.1 %
NEUTROPHILS # BLD AUTO: 2.88 X10 (3) UL (ref 1.5–7.7)
NEUTROPHILS # BLD AUTO: 2.88 X10(3) UL (ref 1.5–7.7)
NEUTROPHILS NFR BLD AUTO: 67.7 %
OSMOLALITY SERPL CALC.SUM OF ELEC: 292 MOSM/KG (ref 275–295)
PLATELET # BLD AUTO: 415 10(3)UL (ref 150–450)
POTASSIUM SERPL-SCNC: 3.6 MMOL/L (ref 3.5–5.1)
POTASSIUM SERPL-SCNC: 3.8 MMOL/L (ref 3.5–5.1)
PROT SERPL-MCNC: 5.9 G/DL (ref 5.7–8.2)
RBC # BLD AUTO: 2.65 X10(6)UL
RSV RNA SPEC NAA+PROBE: NEGATIVE
SARS-COV-2 RNA RESP QL NAA+PROBE: NOT DETECTED
SODIUM SERPL-SCNC: 141 MMOL/L (ref 136–145)
TOTAL IRON BINDING CAPACITY: 240 UG/DL (ref 250–425)
TRANSFERRIN SERPL-MCNC: 182 MG/DL (ref 250–380)
TROPONIN I SERPL HS-MCNC: 12 NG/L
TROPONIN I SERPL HS-MCNC: 12 NG/L
WBC # BLD AUTO: 4.3 X10(3) UL (ref 4–11)

## 2024-08-31 PROCEDURE — 71275 CT ANGIOGRAPHY CHEST: CPT | Performed by: EMERGENCY MEDICINE

## 2024-08-31 PROCEDURE — 99223 1ST HOSP IP/OBS HIGH 75: CPT | Performed by: HOSPITALIST

## 2024-08-31 PROCEDURE — 71045 X-RAY EXAM CHEST 1 VIEW: CPT | Performed by: EMERGENCY MEDICINE

## 2024-08-31 RX ORDER — METOPROLOL TARTRATE 1 MG/ML
5 INJECTION, SOLUTION INTRAVENOUS ONCE
Status: COMPLETED | OUTPATIENT
Start: 2024-08-31 | End: 2024-08-31

## 2024-08-31 RX ORDER — PROCHLORPERAZINE EDISYLATE 5 MG/ML
5 INJECTION INTRAMUSCULAR; INTRAVENOUS EVERY 8 HOURS PRN
Status: DISCONTINUED | OUTPATIENT
Start: 2024-08-31 | End: 2024-09-05

## 2024-08-31 RX ORDER — FLUTICASONE PROPIONATE 110 UG/1
2 AEROSOL, METERED RESPIRATORY (INHALATION) EVERY 4 HOURS PRN
Status: DISCONTINUED | OUTPATIENT
Start: 2024-08-31 | End: 2024-08-31

## 2024-08-31 RX ORDER — FLECAINIDE ACETATE 100 MG/1
200 TABLET ORAL ONCE
Status: DISCONTINUED | OUTPATIENT
Start: 2024-08-31 | End: 2024-08-31

## 2024-08-31 RX ORDER — HYDROCODONE BITARTRATE AND ACETAMINOPHEN 5; 325 MG/1; MG/1
1 TABLET ORAL EVERY 4 HOURS PRN
Status: DISCONTINUED | OUTPATIENT
Start: 2024-08-31 | End: 2024-09-05

## 2024-08-31 RX ORDER — LORAZEPAM 0.5 MG/1
1 TABLET ORAL 2 TIMES DAILY PRN
COMMUNITY

## 2024-08-31 RX ORDER — SODIUM HYPOCHLORITE 1.25 MG/ML
SOLUTION TOPICAL AS NEEDED
Status: DISCONTINUED | OUTPATIENT
Start: 2024-08-31 | End: 2024-09-05

## 2024-08-31 RX ORDER — LORATADINE 10 MG/1
10 TABLET, ORALLY DISINTEGRATING ORAL 2 TIMES DAILY
Status: DISCONTINUED | OUTPATIENT
Start: 2024-08-31 | End: 2024-09-05

## 2024-08-31 RX ORDER — PANTOPRAZOLE SODIUM 20 MG/1
20 TABLET, DELAYED RELEASE ORAL
Status: DISCONTINUED | OUTPATIENT
Start: 2024-08-31 | End: 2024-08-31

## 2024-08-31 RX ORDER — POTASSIUM CHLORIDE 1500 MG/1
40 TABLET, EXTENDED RELEASE ORAL EVERY 4 HOURS
Status: DISPENSED | OUTPATIENT
Start: 2024-08-31 | End: 2024-08-31

## 2024-08-31 RX ORDER — GABAPENTIN 100 MG/1
100 CAPSULE ORAL 3 TIMES DAILY
COMMUNITY

## 2024-08-31 RX ORDER — FENTANYL 50 UG/1
1 PATCH TRANSDERMAL
Status: DISCONTINUED | OUTPATIENT
Start: 2024-08-31 | End: 2024-09-05

## 2024-08-31 RX ORDER — POTASSIUM CHLORIDE 1500 MG/1
40 TABLET, EXTENDED RELEASE ORAL ONCE
Status: DISCONTINUED | OUTPATIENT
Start: 2024-08-31 | End: 2024-09-05

## 2024-08-31 RX ORDER — HYDROMORPHONE HYDROCHLORIDE 1 MG/ML
0.2 INJECTION, SOLUTION INTRAMUSCULAR; INTRAVENOUS; SUBCUTANEOUS EVERY 4 HOURS PRN
Status: DISCONTINUED | OUTPATIENT
Start: 2024-08-31 | End: 2024-09-05

## 2024-08-31 RX ORDER — LEVOFLOXACIN 750 MG/1
750 TABLET, FILM COATED ORAL DAILY
Qty: 5 TABLET | Refills: 0 | Status: SHIPPED | OUTPATIENT
Start: 2024-09-01 | End: 2024-09-05

## 2024-08-31 RX ORDER — ALPRAZOLAM 0.25 MG
0.5 TABLET ORAL 2 TIMES DAILY PRN
Status: DISCONTINUED | OUTPATIENT
Start: 2024-08-31 | End: 2024-08-31

## 2024-08-31 RX ORDER — ACETAMINOPHEN 500 MG
500 TABLET ORAL EVERY 4 HOURS PRN
Status: DISCONTINUED | OUTPATIENT
Start: 2024-08-31 | End: 2024-09-05

## 2024-08-31 RX ORDER — HYDROMORPHONE HYDROCHLORIDE 1 MG/ML
0.4 INJECTION, SOLUTION INTRAMUSCULAR; INTRAVENOUS; SUBCUTANEOUS EVERY 4 HOURS PRN
Status: DISCONTINUED | OUTPATIENT
Start: 2024-08-31 | End: 2024-09-05

## 2024-08-31 RX ORDER — SODIUM PHOSPHATE, DIBASIC AND SODIUM PHOSPHATE, MONOBASIC 7; 19 G/230ML; G/230ML
1 ENEMA RECTAL ONCE AS NEEDED
Status: DISCONTINUED | OUTPATIENT
Start: 2024-08-31 | End: 2024-09-05

## 2024-08-31 RX ORDER — ONDANSETRON 2 MG/ML
4 INJECTION INTRAMUSCULAR; INTRAVENOUS EVERY 6 HOURS PRN
Status: DISCONTINUED | OUTPATIENT
Start: 2024-08-31 | End: 2024-09-04

## 2024-08-31 RX ORDER — HYDROMORPHONE HYDROCHLORIDE 1 MG/ML
1 INJECTION, SOLUTION INTRAMUSCULAR; INTRAVENOUS; SUBCUTANEOUS ONCE
Status: COMPLETED | OUTPATIENT
Start: 2024-08-31 | End: 2024-08-31

## 2024-08-31 RX ORDER — FLECAINIDE ACETATE 100 MG/1
200 TABLET ORAL ONCE
Status: COMPLETED | OUTPATIENT
Start: 2024-08-31 | End: 2024-08-31

## 2024-08-31 RX ORDER — LORAZEPAM 0.5 MG/1
0.5 TABLET ORAL 2 TIMES DAILY PRN
Status: DISCONTINUED | OUTPATIENT
Start: 2024-08-31 | End: 2024-09-05

## 2024-08-31 RX ORDER — FAMOTIDINE 20 MG/1
20 TABLET, FILM COATED ORAL 2 TIMES DAILY
Status: DISCONTINUED | OUTPATIENT
Start: 2024-08-31 | End: 2024-09-05

## 2024-08-31 RX ORDER — IPRATROPIUM BROMIDE AND ALBUTEROL SULFATE 2.5; .5 MG/3ML; MG/3ML
3 SOLUTION RESPIRATORY (INHALATION) EVERY 4 HOURS PRN
Status: DISCONTINUED | OUTPATIENT
Start: 2024-08-31 | End: 2024-09-01

## 2024-08-31 RX ORDER — FLUTICASONE PROPIONATE 110 UG/1
2 AEROSOL, METERED RESPIRATORY (INHALATION) EVERY 4 HOURS PRN
COMMUNITY

## 2024-08-31 RX ORDER — BISACODYL 10 MG
10 SUPPOSITORY, RECTAL RECTAL
Status: DISCONTINUED | OUTPATIENT
Start: 2024-08-31 | End: 2024-09-05

## 2024-08-31 RX ORDER — GABAPENTIN 100 MG/1
100 CAPSULE ORAL 3 TIMES DAILY
Status: DISCONTINUED | OUTPATIENT
Start: 2024-08-31 | End: 2024-08-31

## 2024-08-31 RX ORDER — LORATADINE 10 MG/1
10 TABLET ORAL 2 TIMES DAILY
COMMUNITY

## 2024-08-31 RX ORDER — SENNOSIDES 8.6 MG
17.2 TABLET ORAL NIGHTLY PRN
Status: DISCONTINUED | OUTPATIENT
Start: 2024-08-31 | End: 2024-09-05

## 2024-08-31 RX ORDER — TIZANIDINE 2 MG/1
4 TABLET ORAL 3 TIMES DAILY PRN
Status: DISCONTINUED | OUTPATIENT
Start: 2024-08-31 | End: 2024-09-05

## 2024-08-31 RX ORDER — OXYCODONE AND ACETAMINOPHEN 5; 325 MG/1; MG/1
2 TABLET ORAL EVERY 6 HOURS PRN
Status: DISCONTINUED | OUTPATIENT
Start: 2024-08-31 | End: 2024-08-31

## 2024-08-31 RX ORDER — OXYCODONE AND ACETAMINOPHEN 5; 325 MG/1; MG/1
1 TABLET ORAL EVERY 6 HOURS PRN
Status: DISCONTINUED | OUTPATIENT
Start: 2024-08-31 | End: 2024-08-31

## 2024-08-31 RX ORDER — LEVALBUTEROL TARTRATE 45 UG/1
2 AEROSOL, METERED ORAL EVERY 4 HOURS PRN
Status: DISCONTINUED | OUTPATIENT
Start: 2024-08-31 | End: 2024-09-01

## 2024-08-31 RX ORDER — FLUTICASONE PROPIONATE 110 UG/1
1 AEROSOL, METERED RESPIRATORY (INHALATION) 2 TIMES DAILY
Status: DISCONTINUED | OUTPATIENT
Start: 2024-08-31 | End: 2024-08-31 | Stop reason: ALTCHOICE

## 2024-08-31 RX ORDER — POLYETHYLENE GLYCOL 3350 17 G/17G
17 POWDER, FOR SOLUTION ORAL DAILY PRN
Status: DISCONTINUED | OUTPATIENT
Start: 2024-08-31 | End: 2024-09-05

## 2024-08-31 RX ORDER — LEVALBUTEROL TARTRATE 45 UG/1
2 AEROSOL, METERED ORAL EVERY 4 HOURS PRN
COMMUNITY

## 2024-08-31 RX ORDER — MELATONIN
3 NIGHTLY PRN
Status: DISCONTINUED | OUTPATIENT
Start: 2024-08-31 | End: 2024-09-05

## 2024-08-31 RX ORDER — FENTANYL 50 UG/1
1 PATCH TRANSDERMAL
COMMUNITY
Start: 2024-08-23 | End: 2024-09-22

## 2024-08-31 RX ORDER — HYDROMORPHONE HYDROCHLORIDE 1 MG/ML
0.8 INJECTION, SOLUTION INTRAMUSCULAR; INTRAVENOUS; SUBCUTANEOUS EVERY 4 HOURS PRN
Status: DISCONTINUED | OUTPATIENT
Start: 2024-08-31 | End: 2024-09-05

## 2024-08-31 RX ORDER — HYDROCODONE BITARTRATE AND ACETAMINOPHEN 5; 325 MG/1; MG/1
2 TABLET ORAL EVERY 4 HOURS PRN
Status: DISCONTINUED | OUTPATIENT
Start: 2024-08-31 | End: 2024-09-05

## 2024-08-31 RX ORDER — HYDROCODONE BITARTRATE AND ACETAMINOPHEN 5; 325 MG/1; MG/1
1 TABLET ORAL EVERY 6 HOURS PRN
Status: DISCONTINUED | OUTPATIENT
Start: 2024-08-31 | End: 2024-09-05

## 2024-08-31 RX ORDER — OXYCODONE AND ACETAMINOPHEN 5; 325 MG/1; MG/1
1-2 TABLET ORAL EVERY 6 HOURS PRN
Status: DISCONTINUED | OUTPATIENT
Start: 2024-08-31 | End: 2024-08-31 | Stop reason: SDUPTHER

## 2024-08-31 NOTE — ED QUICK NOTES
Orders for admission, patient is aware of plan and ready to go upstairs. Any questions, please call ED RN Isidra at extension [phone].     Patient Covid vaccination status: Unvaccinated     COVID Test Ordered in ED: SARS-CoV-2/Flu A and B/RSV by PCR (GeneXpert)    COVID Suspicion at Admission: N/A    Running Infusions:    dilTIAZem 5 mg/hr (08/31/24 0435)        Mental Status/LOC at time of transport: AOx4    Other pertinent information: Chemo patient, has port accessed.  Feels weak, fall risk  CIWA score: N/A   NIH score:  N/A

## 2024-08-31 NOTE — H&P
[facility]     Yesenia Daniels Patient Status:  Emergency    1975 MRN [id number]   Location [facility] EMERGENCY DEPARTMENT Attending Ibrahima Flores MD   Hosp Day # 0 PCP Eduardo Brooks MD     Chief Complaint: SOB    Subjective:    History of Present Illness:     Yesenia Daniels is a 49 year old female with history of metastatic breast cancer to lungs and brain, atrial fibrillation, anemia presents emergency room with shortness of breath that started 3 days ago.  No fevers, chills, nausea, vomiting, diarrhea or constipation.  No chest pain but did have some palpitations.  No dizziness, lightheadedness, or syncope.  No numbness or ting of extremities or any focalized weakness.    History/Other:    Past Medical History:  Past Medical History:    Arrhythmia    Asthma (HCC)    Back problem    Breast cancer (HCC)    Cancer (HCC)    Shortness of breath    Visual impairment     Past Surgical History:   Past Surgical History:   Procedure Laterality Date    Cholecystectomy      Endometrial ablation            x5    Removal gallbladder        Family History:   Family History   Problem Relation Age of Onset    Heart Disorder Father     Cancer Father     Heart Disorder Mother     Heart Disorder Maternal Grandmother     Heart Disorder Maternal Grandfather     Heart Disorder Paternal Grandmother     Heart Disorder Sister      Social History:    reports that she has never smoked. She has never used smokeless tobacco. She reports that she does not drink alcohol and does not use drugs.     Allergies:   Allergies   Allergen Reactions    Antihistamine & Nasal Deconges [Fexofenadine-Pseudoephedrine] PALPITATIONS    Benadryl [Diphenhydramine] PALPITATIONS and SHORTNESS OF BREATH    Maalox Advanced Max St [Antacid] PALPITATIONS and SHORTNESS OF BREATH    Morphine ANXIETY     Tolerates hydrocodone    Sudafed [Pseudoephedrine] PALPITATIONS and SHORTNESS OF BREATH    Betadine  [Povidone Iodine] RASH    Erythromycin NAUSEA AND VOMITING    Latex RASH    Other OTHER (SEE COMMENTS)     Morphine makes her loopy,benedryl as well    Vitamin E RASH       Medications:    No current facility-administered medications on file prior to encounter.     Current Outpatient Medications on File Prior to Encounter   Medication Sig Dispense Refill    Betamethasone Dipropionate Aug (DIPROLENE AF) 0.05 % External Cream Apply 1 Application topically 2 (two) times daily. 50 g 1    sodium hypochlorite 0.125 % External Solution Apply on affected areas daily. 1000 mL 3    tiZANidine 4 MG Oral Tab Take 1 tablet (4 mg total) by mouth 3 (three) times daily as needed (spasms). 20 tablet 0    famotidine 20 MG Oral Tab Take 1 tablet (20 mg total) by mouth 2 (two) times daily.      XARELTO 20 MG Oral Tab Take 1 tablet (20 mg total) by mouth daily.      Ketorolac Tromethamine 10 MG Oral Tab Take 1 tablet (10 mg total) by mouth every 6 (six) hours as needed for Pain. 20 tablet 0    rivaroxaban 15 & 20 MG Oral Tablet Therapy Pack Take As Directed based on package instructions: Days 1-21: 15 mg by mouth twice daily Days 22-30: 20 mg by mouth once daily 1 each 0    oxyCODONE-acetaminophen 5-325 MG Oral Tab Take 1-2 tablets by mouth every 6 (six) hours as needed for Pain. 20 tablet 0    cetirizine 10 MG Oral Tab Take 1 tablet (10 mg total) by mouth daily.      omeprazole 20 MG Oral Capsule Delayed Release Take 1 capsule (20 mg total) by mouth at bedtime.      dilTIAZem HCl ER Coated Beads 240 MG Oral Capsule SR 24 Hr Take 1 capsule (240 mg total) by mouth daily. (Patient taking differently: Take 1 capsule (240 mg total) by mouth at bedtime.) 30 capsule 1       Review of Systems:   A comprehensive review of systems was completed.    Pertinent positives and negatives noted in the HPI.    Objective:   Physical Exam:    /83   Pulse 98   Temp 98.1 °F (36.7 °C)   Resp 20   Ht 5' 8\" (1.727 m)   Wt (!) 315 lb (142.9 kg)    SpO2 99%   BMI 47.90 kg/m²   General: No acute distress, Alert  Respiratory: No rhonchi, no wheezes  Cardiovascular: S1, S2. Regular rate and rhythm  Abdomen: Soft, Non-tender, non-distended, positive bowel sounds  Neuro: No new focal deficits  Extremities: No edema      Results:    Labs:      Labs Last 24 Hours:    Recent Labs   Lab 08/31/24  0352   RBC 2.65*   HGB 8.3*   HCT 26.2*   MCV 98.9   MCH 31.3   MCHC 31.7   RDW 21.3   NEPRELIM 2.88   WBC 4.3   .0       Recent Labs   Lab 08/31/24  0352   GLU 92   BUN 14   CREATSERUM 0.56   EGFRCR 112   CA 9.2   ALB 3.6      K 3.6      CO2 24.0   ALKPHO 64   AST 19   ALT 23   BILT 0.7   TP 5.9       Lab Results   Component Value Date    INR 1.07 01/03/2023    INR 1.07 10/02/2020       Recent Labs   Lab 08/31/24  0352   TROPHS 12       No results for input(s): \"TROP\", \"PBNP\" in the last 168 hours.    No results for input(s): \"PCT\" in the last 168 hours.    Imaging: Imaging data reviewed in Epic.    Assessment & Plan:      #Atrial fibrillation with RVR  - Will continue on cardizem drip  - Pt already on xarelto    # Pneumonia  - RLL on imaging  - Continue cefriaxone and azithromycin  - CUltures pending    # Recent history of DVT in the right upper extremity  -Will continue on Xarelto    # Breast cancer  - Pt on chemotherapy    # Anemia  - Likely multifactorial with cancer, chemo and infection.   - Will check iron studies.    Plan of care discussed with patient at bedside.    Ted Love, DO    Supplementary Documentation:     The 21st Century Cures Act makes medical notes like these available to patients in the interest of transparency. Please be advised this is a medical document. Medical documents are intended to carry relevant information, facts as evident, and the clinical opinion of the practitioner. The medical note is intended as peer to peer communication and may appear blunt or direct. It is written in medical language and may contain  abbreviations or verbiage that are unfamiliar.

## 2024-08-31 NOTE — ED QUICK NOTES
Rounded on patient, provided updates to care plan, transport times. Patient resting on stretcher, family member at bedside. VSS.

## 2024-08-31 NOTE — PLAN OF CARE
NURSING ADMISSION NOTE      Patient admitted via Cart  Oriented to room.  Safety precautions initiated.  Bed in low position.  Call light in reach.    Admission navigator completed   A/Ox4. RA. Afib on tele  Cardizem drip infusing at 5ml/hr   R chest port a cath   Xarelto for VTE  Cardiac EP- Potassium replaced. Redraw 1600   Regular diet   Ambulates SBA with cane   Safety precautions in place.  at bedside. All needs met at this time

## 2024-08-31 NOTE — BH PROGRESS NOTE
Pt seen- meds adjusted  She is hoping to go home today, discussed with her that if her HR doesn't improve will be less likely.  She is on Abx for PNA   She wants to try least sedating PRN pain meds for now in case she goes home today- she wants to celebrate her spouse birthday. Should she remain in house  due to he HR we can further adjust her pain meds    Monica Loyola MD

## 2024-08-31 NOTE — CONSULTS
[facility]  Cardiology Consultation    Yesenia Daniels Patient Status:  Emergency    1975 MRN [id number]   Location [facility] EMERGENCY DEPARTMENT Attending Ibrahima Flores MD   Hosp Day # 0 PCP Eduardo Brooks MD     Consults      Reason for Consultation     Atrial fibrillation        History of Present Illness     Yesenia Daniels is a a(n) 49 year old female here with recurrent symptomatic AF.    Patient has stage IV metastatic breast cancer with skin involvement on her chest.  A-fib began somewhere around 2 AM this morning with palpitations and shortness of breath.  She had been taking albuterol which she thinks may have triggered this.  She also has had some chemotherapy changes.  Normal ejection fraction.  No known coronary disease      DCCV - Anastacio  Normal EF multiple times with chemo surveillance  PAF      History:     Past Medical History:    Arrhythmia    Asthma (HCC)    Back problem    Breast cancer (HCC)    Cancer (HCC)    Shortness of breath    Visual impairment     Past Surgical History:   Procedure Laterality Date    Cholecystectomy      Endometrial ablation            x5    Removal gallbladder       Family History   Problem Relation Age of Onset    Heart Disorder Father     Cancer Father     Heart Disorder Mother     Heart Disorder Maternal Grandmother     Heart Disorder Maternal Grandfather     Heart Disorder Paternal Grandmother     Heart Disorder Sister       reports that she has never smoked. She has never used smokeless tobacco. She reports that she does not drink alcohol and does not use drugs.      Allergies:     Allergies   Allergen Reactions    Antihistamine & Nasal Deconges [Fexofenadine-Pseudoephedrine] PALPITATIONS    Benadryl [Diphenhydramine] PALPITATIONS and SHORTNESS OF BREATH    Maalox Advanced Max St [Antacid] PALPITATIONS and SHORTNESS OF BREATH    Morphine ANXIETY     Tolerates hydrocodone    Sudafed [Pseudoephedrine] PALPITATIONS and SHORTNESS  OF BREATH    Betadine [Povidone Iodine] RASH    Erythromycin NAUSEA AND VOMITING    Latex RASH    Other OTHER (SEE COMMENTS)     Morphine makes her loopy,benedryl as well    Vitamin E RASH         Medications       Current Facility-Administered Medications:     dilTIAZem 10 mg BOLUS FROM BAG infusion, 10 mg, Intravenous, Q1H PRN    dilTIAZem (cardIZEM) 100 mg in sodium chloride 0.9% 100 mL IVPB-ADDV, 2.5-20 mg/hr, Intravenous, Continuous    azithromycin (Zithromax) 500 mg in sodium chloride 0.9% 250mL IVPB premix, 500 mg, Intravenous, Once      Review of Systems     10 point ROS was negative except  Shortness of breath, palpitations      Telemetry:         Telemetry: Atrial fibrillation with rapid ventricular response      Physical Exam     Physical Exam   Blood pressure 114/86, pulse 76, temperature 98.1 °F (36.7 °C), resp. rate 23, height 5' 8\" (1.727 m), weight (!) 315 lb (142.9 kg), SpO2 99%, not currently breastfeeding.  Temp (24hrs), Av.1 °F (36.7 °C), Min:98.1 °F (36.7 °C), Max:98.1 °F (36.7 °C)    Wt Readings from Last 3 Encounters:   24 (!) 315 lb (142.9 kg)   24 285 lb (129.3 kg)   23 286 lb (129.7 kg)     Well-appearing  NAD  PERRLA/EOMI  Neck veins not elevated  Carotids- no bruits  CTA bilaterally  Cardiac-irregularly irregular S1-S2  Abdomen- Soft,Nontender, normal BS  Extremities- pulses normal  Edema-2+ edema  Mood /Affect Congruent  Skin- no lesions            Lab/Radiology Results     Recent Labs   Lab 24  0352   GLU 92   BUN 14   CREATSERUM 0.56   EGFRCR 112   CA 9.2      K 3.6      CO2 24.0     Recent Labs   Lab 24  0352   RBC 2.65*   HGB 8.3*   HCT 26.2*   MCV 98.9   MCH 31.3   MCHC 31.7   RDW 21.3   NEPRELIM 2.88   WBC 4.3   .0         [unfilled]  No results for input(s): \"BNP\" in the last 168 hours.  Lab Results   Component Value Date    INR 1.07 2023    INR 1.07 10/02/2020     Lab Results   Component Value Date    TROP <0.045 2021     TROP <0.045 10/02/2020    TROP <0.045 10/02/2020         No results found.   EKG 12 Lead    Result Date: 8/31/2024  Atrial fibrillation with rapid ventricular response Nonspecific ST abnormality Abnormal QRS-T angle, consider primary T wave abnormality Abnormal ECG When compared with ECG of 03-JAN-2023 15:29, Atrial fibrillation has replaced Sinus rhythm Vent. rate has increased BY  59 BPM Borderline criteria for Lateral infarct are no longer Present Nonspecific T wave abnormality, improved in Anterior leads       Problem List     Patient Active Problem List   Diagnosis    Atrial fibrillation with rapid ventricular response (HCC)    Epigastric abdominal pain    Hyponatremia    Leukocytosis    Chest pressure    Acute deep vein thrombosis (DVT) of axillary vein of right upper extremity (HCC)    Acute deep vein thrombosis (DVT) of brachial vein of right upper extremity (HCC)    Open wound of left breast    Metastasis from breast cancer (HCC)    Sloughing of wound    Allergic reaction    Localized edema    Atrial fibrillation with RVR (HCC)       Diagnostic Testing     ECG     TTE normal ejection fraction normal strain      Assessment     Symptomatic atrial [id number] score 1 for gender  History of DVT on prophylactic Xarelto 10 mg daily  Stage IV metastatic breast cancer with numerous cutaneous involvement of the chest and back as well as the brains        Plan     Continue diltiazem  Patient would like to be in sinus rhythm expeditiously.  She would like to avoid a cardioversion if at all possible given that it may be challenging with all the skin involvement on her back and chest which is where we would place the paddles for cardioversion  No known coronary disease.  Will administer flecainide 200 mg x 1  Will increase her Xarelto dose to 20 mg daily.  Dose ordered for today          C5      Damián Cordova MD    Cardiac Electrophysiology  [facility] [place]  8/31/2024  7:04 AM

## 2024-08-31 NOTE — ED PROVIDER NOTES
Patient Seen in: [facility] Emergency Department      History     Chief Complaint   Patient presents with    Difficulty Breathing     Stated Complaint: cancer patient, chest palpitations, and shortness of breath    Subjective:   Patient is a 49-year-old female with extensive medical history including triple negative breast cancer with metastasis and inflammatory breast cancer.  Patient reports that she is on Xarelto for prior blood clot she has a port in place she recently had a new round of chemo and feels that her elevated heart rate is due to this she also has a history of A-fib.  Patient is in A-fib currently.  Patient reports that she did take albuterol prior to arrival.  She reports that she is feeling short of breath.  She denies any fevers or cough.              Objective:   Past Medical History:    Arrhythmia    Asthma (HCC)    Back problem    Breast cancer (HCC)    Cancer (HCC)    Shortness of breath    Visual impairment              Past Surgical History:   Procedure Laterality Date    Cholecystectomy      Endometrial ablation            x5    Removal gallbladder                  Social History     Socioeconomic History    Marital status:    Tobacco Use    Smoking status: Never    Smokeless tobacco: Never   Vaping Use    Vaping status: Never Used   Substance and Sexual Activity    Alcohol use: No     Alcohol/week: 0.0 standard drinks of alcohol    Drug use: No     Social Determinants of Health     Financial Resource Strain: Low Risk  (2022)    Received from [facility], [facility]    Overall Financial Resource Strain (CARDIA)     Difficulty of Paying Living Expenses: Not hard at all   Food Insecurity: No Food Insecurity (2022)    Received from [facility], [facility]    Hunger Vital Sign     Worried About Running Out of Food in the Last Year: Never true     Ran Out of Food in the Last Year:  Never true   Transportation Needs: No Transportation Needs (12/14/2022)    Received from [facility], [facility]    PRAPARE - Transportation     Lack of Transportation (Medical): No     Lack of Transportation (Non-Medical): No   Physical Activity: Insufficiently Active (12/14/2022)    Received from [facility]    Exercise Vital Sign     Days of Exercise per Week: 1 day     Minutes of Exercise per Session: 30 min   Stress: No Stress Concern Present (12/14/2022)    Received from [facility], [facility] [place] [facility] - Occupational Stress Questionnaire     Feeling of Stress : Not at all    Received from [facility]              Review of Systems   Constitutional:  Negative for fever.   Respiratory:  Positive for shortness of breath. Negative for cough.    Cardiovascular:  Positive for palpitations. Negative for chest pain.       Positive for stated Chief Complaint: Difficulty Breathing    Other systems are as noted in HPI.  Constitutional and vital signs reviewed.      All other systems reviewed and negative except as noted above.    Physical Exam     ED Triage Vitals [08/31/24 0332]   /71   Pulse (!) 130   Resp 22   Temp 98.1 °F (36.7 °C)   Temp src    SpO2 99 %   O2 Device None (Room air)       Current Vitals:   Vital Signs  BP: 112/83  Pulse: 98  Resp: 20  Temp: 98.1 °F (36.7 °C)    Oxygen Therapy  SpO2: 99 %  O2 Device: None (Room air)            Physical Exam  Vitals and nursing note reviewed.   Constitutional:       General: She is not in acute distress.     Appearance: She is well-developed. She is obese. She is not ill-appearing, toxic-appearing or diaphoretic.   HENT:      Head: Normocephalic and atraumatic.   Eyes:      Comments: Patient wearing sunglasses, she denies migraine headache   Cardiovascular:      Rate and  Rhythm: Tachycardia present. Rhythm irregular.   Pulmonary:      Effort: Pulmonary effort is normal. No tachypnea, accessory muscle usage or respiratory distress.      Breath sounds: Normal breath sounds. No wheezing.   Musculoskeletal:         General: Normal range of motion.      Cervical back: Normal range of motion.   Skin:     General: Skin is warm.      Capillary Refill: Capillary refill takes less than 2 seconds.   Neurological:      General: No focal deficit present.      Mental Status: She is alert and oriented to person, place, and time.   Psychiatric:         Mood and Affect: Mood is anxious.         Behavior: Behavior normal.               ED Course     Labs Reviewed   CBC WITH DIFFERENTIAL WITH PLATELET - Abnormal; Notable for the following components:       Result Value    RBC 2.65 (*)     HGB 8.3 (*)     HCT 26.2 (*)     Lymphocyte Absolute 0.58 (*)     All other components within normal limits   COMP METABOLIC PANEL (14) - Normal   TROPONIN I HIGH SENSITIVITY - Normal   SARS-COV-2/FLU A AND B/RSV BY PCR (GENEXPERT) - Normal    Narrative:     This test is intended for the qualitative detection and differentiation of SARS-CoV-2, influenza A, influenza B, and respiratory syncytial virus (RSV) viral RNA in nasopharyngeal or nares swabs from individuals suspected of respiratory viral infection consistent with COVID-19 by their healthcare provider. Signs and symptoms of respiratory viral infection due to SARS-CoV-2, influenza, and RSV can be similar.    Test performed using the Xpert Xpress SARS-CoV-2/FLU/RSV (real time RT-PCR)  assay on the GeneXpert instrument, [employer], [place] [address].   This test is being used under the Food and Drug Administration's Emergency Use Authorization.    The authorized Fact Sheet for Healthcare Providers for this assay is available upon request from the laboratory.   TROPONIN I HIGH SENSITIVITY   LACTIC ACID, PLASMA   RAINBOW DRAW BLUE   BLOOD CULTURE   BLOOD CULTURE      EKG    Rate, intervals and axes as noted on EKG Report.  Rate: 118  Rhythm: Atrial Fibrillation  Reading: Atrial for with RVR no ST elevation ME interval does not exist QRS of 100 QTc of 456 with axis of 86/-35            Chest x-ray shows moderate to large region of consolidation in the right lung base probably pneumonia related to the history of patient's cancer or combination of both.  Additional faint groundglass opacities in the left mid lung zone.  Right Mediport.  Cardiomediastinal silhouette is unremarkable no pneumothorax or pleural effusion no acute bony abnormality.     CT scan shows study is technically diagnostic no evidence of pulmonary embolism severe skin thickening in the left breast, patible with history of breast cancer enlargement of mediastinal and right axillary lymph nodes.  Occlusion of the right middle lobe bronchus with collapse of the right middle lobe.  Other nodular patchy consolidation in the right lower lobe and mild interstitial thickening probably pneumonia however difficult to exclude malignancy with lymphangitic spread of carcinoma or combination of the above trace right pleural effusion right medial port mass extending from the greater curvature of the stomach.  Images of the aorta without evidence of aneurysm.         MDM      Social -negative tobacco, negative etoh, negative drugs  Family History-noncontributory  Past Medical History-asthma, arrhythmia, breast cancer, blood clot, atrial fibs    Differential diagnosis before testing included atrial fed, medication side effects, pulmonary embolism, pneumothorax, viral syndrome    Co-morbidities that add to the complexity of management include: Breast cancer, new chemotherapy medication this week, history of A-fib    Testing ordered during this visit included chest x-ray EKG baseline labs pulmonary embolism study    Radiographic images  I personally reviewed the radiographs and my individual interpretation shows CT shows mass  versus pneumonia and mass on right lower lobe, chest x-ray shows a right sided large pneumonia on the right base  I also reviewed the official reports that showed CT scan shows study is technically diagnostic no evidence of pulmonary embolism severe skin thickening in the left breast, patible with history of breast cancer enlargement of mediastinal and right axillary lymph nodes.  Occlusion of the right middle lobe bronchus with collapse of the right middle lobe.  Other nodular patchy consolidation in the right lower lobe and mild interstitial thickening probably pneumonia however difficult to exclude malignancy with lymphangitic spread of carcinoma or combination of the above trace right pleural effusion right medial port mass extending from the greater curvature of the stomach.  Images of the aorta without evidence of aneurysm.  Chest x-ray shows Chest x-ray shows moderate to large region of consolidation in the right lung base probably pneumonia related to the history of patient's cancer or combination of both.  Additional faint groundglass opacities in the left mid lung zone.  Right Mediport.  Cardiomediastinal silhouette is unremarkable no pneumothorax or pleural effusion no acute bony abnormality.    External chart review showed review of Care Everywhere in [facility] system shows patient started a new medication for her chemotherapy on the most recent treatment    History obtained by an independent source included from patient, family    Discussion of management with patient, family    Social determinants of health that affect care include chemotherapy patient      Medications Provided: Metoprolol, Rocephin, azithromycin    Course of Events during Emergency Room Visit include 49-year-old female who presents for shortness of breath and is found to be in A-fib with RVR.  I will try metoprolol to see if that will help lower her heart rate which she did bring it down to approximately 100 but normally she states her  heart rates in the 50s.  Will give Cardizem.  Patient to be get a PE study as well.    Patient's chest x-ray shows a right-sided pneumonia.  Patient is anemic she has no white count here but is immunocompromise.  Given these findings we will start on IV antibiotics.  Patient will be discussed with [facility] and plan for admission with Cardizem drip will speak with cardiology.  Her initial troponin is negative.    Patient discussed with cardiology they are okay with the plan to continue with the Cardizem drip.  Patient CT scan shows possible pneumonia versus pneumonia and mass, given this finding we will give IV antibiotics of Rocephin and azithromycin clarified with patient that she has an allergy to erythromycin.  She states that if she does have any reaction she does not want Benadryl as she has bad reaction to that.  She does well of steroids if she does have any reaction to the azithromycin however she has taken Z-Elias's in the past without problems.  I discussed the patient with the [facility] for admission.    Cardiology does not want the patient on oncology floor given the atrial for with RVR currently.  They will have her stay on the med/tele unit at this time  Disposition:    Admission  I have discussed with the patient the results of test, differential diagnosis, and treatment plan. They expressed clear understanding of these instructions and agrees to the plan provided.     Admission disposition: 8/31/2024  5:59 AM                                        Medical Decision Making      Disposition and Plan     Clinical Impression:  1. Atrial fibrillation with RVR (HCC)    2. Community acquired pneumonia, unspecified laterality    3. Anemia, unspecified type    4. History of breast cancer         Disposition:  Admit  8/31/2024  5:59 am    Follow-up:  No follow-up provider specified.        Medications Prescribed:  Current Discharge Medication List                            Hospital Problems        Present on Admission  Date Reviewed: 8/19/2024            ICD-10-CM Noted POA    * (Principal) Atrial fibrillation with RVR (HCC) I48.91 8/31/2024 Unknown

## 2024-08-31 NOTE — ED INITIAL ASSESSMENT (HPI)
Patient has triple negative breast cancer with metastasis to lungs and brain. Reports palpitations and shortness of breath

## 2024-08-31 NOTE — HISTORICAL OFFICE NOTE
[facility]  Outside Information  Continuity of Care Document  10/17/2023  Yesenia Garber - 48 y.o. Female; born [birth year]Summary of episode note, generated on Feb. 27, 2024February 27, 2024   CHIEF COMPLAINT    CHIEF COMPLAINT  Reason for Visit/Chief Complaint   f/u  pre-op lumpectomy on 6/26 with Dr. Blount at [facility]   Patient is here for follow-up regarding paroxysmal atrial fibrillation and ongoing breast cancer treatment.She is status post lumpectomy after receiving chemotherapy. She to switch to oral chemotherapy for 6 months. She has a permacath on the right and an old hematoma scar on the right also. She did have lymph node biopsy a conclusive from a PET scan in August 2023.She had some palpitations specially when she is under a lot of stress her daughter is heart transplant recipient on immunosuppressive and has diffuse cancer related to this and is starting chemotherapy. Also her son is getting spinal surgery and has a spot on his thyroid they are not sure ongoing work-up for this.Her right arm has improved mild swelling with the history of thrombus that was treated. Her right chest shows the old port site had a large hematoma apparently and is slowly resolving and the new site looks healed. Echo with strain rate is stable at -19.7 with an EF of 55% range between 19 and 20 the last year. She has occasional palpitations but no sustained episodes such as A-fib. No chest pain or shortness of breath.Since last visit she was diagnosed with triple negative breast cancer in the fall 2022 she started chemotherapy she had a port in the right side and she was actually at [facility] recently with a right arm venous thrombus. Dr. Hdez did thrombectomy and she is now on Xarelto. She did have a port also removed from the right side and put into a different place on the right side she has a moderate hematoma over the old site. During this time she has had some increase in  palpitations but they have been intermittent slight and not long-lasting and nothing sustained the may be A. fib or ectopy is unclear. We talked about different monitoring devices and issues with this but advised her just to focus on breast cancer treatment chemotherapy and will do any monitor at this time will have shorter follow-up. Her cancer treatment is at [facility].Previously noted: Originally had to be cardioverted by Dr. Chaves with the patient had October 2020. She had an echocardiogram July 2022 with normal LV function 67% mild to moderate LVH with normal diastolic function. Left atrial diameter moderately increased and pulmonary pressures at 40 mmHg at most. She has no recurrent A. fib or palpitations no lightheadedness dizziness syncope. Only has a rare individual palpitation but nothing consistent with A. fib.Last echo in October 2020 was okay with normal EF and now this 1 July 2022 EF 67%.  She did have a Covid pneumonia in April 2021 no A. fib during this episode and had a good recovery. She was in [place] in October 2021 and had an episode of A. fib she did call we talked on the phone not much that can be done in the ER was for discharge in A. fib it gave her some medication she finally converted. She had no recurrences since then.At the office visit he recommended event monitor and a stress echo and then consideration for  changing medical therapy with APN follow-up but patient had followed through and now makes an  appointment for EP follow-up myself.  At the time of the ER visit it was noted reporting intermittent palpitations since this morning associated  with upper chest pressure. Patient states that it feels like her heart is flip flopping and beating harder  and faster than normal. Patient reports having had some palpitations in the past but never this faster  this severe. Previous work-up did not reveal any arrhythmias. No cough, no fever, no lower extremity  edema, no  syncope, no sore throat, no changes in the sense of smell or taste, no sick contacts, no  calf pain, no abdominal pain, no other associated symptoms.     PROBLEMS  Reconcile with Patient's ChartPROBLEMS  Problem Effective Dates Date resolved Problem Status   Palpitations, [SNOMED-CT: 13074071] 1/20/2023 - Active   Atrial fibrillation (Afib), paroxysmal - A Fib, [SNOMED-CT: 723267851] 10/29/2020 - Active   History of DVT (deep vein thrombosis) - Hx, [SNOMED-CT: 795419723] 1/17/2023 - Active   OVERTON (dyspnea on exertion), [SNOMED-CT: 58004592] 1/5/2022 - Active   Breast Cancer, [SNOMED-CT: 304450786] 12/2/2022 - Active   Encounter for monitoring cardiotoxic drug therapy, [SNOMED-CT: 698780784] 12/2/2022 - Active     ENCOUNTER DIAGNOSIS    No data available    VITAL SIGNS    VITAL SIGNS  Date / Time: 10/18/2023   BP Systolic 138 mmHg   BP Diastolic 72 mmHg   Height 68 inches   Weight 286 lbs   Pulse Rate 74 bpm   BSA (Body Surface Area) 2.6 cc/m2   BMI (Body Mass Index) 43.5 cc/m2   Blood Pressure 138 / 72 mmHg     PHYSICAL EXAMINATION    PHYSICAL EXAMINATION  Header Details   Constitutional 100% o2   Vitals Left Arm Sitting  / 72 mmHg, Pulse rate 74 bpm, Height in 5' 8\", BMI: 43.5, Weight in 286.6 lbs (or) 130 kgs, BSA : 2.56 cc/m²   General Appearance No Acute Distress, Appropriate   Head/Eyes/Ears/Nose/Mouth/Throat Conjunctiva pink, Sclera Clear, Mucous membranes Moist   Neck Normal carotid pulsations   Respiratory Unlabored, Equal bilaterally   Cardiovascular Regular rhythm. Normal and normal S1 and S2   Gastrointestinal Abdomen soft, Non-tender, Normoactive bowel sounds   Musculoskeletal Normal spine   Gait Normal gait   Strength and tone Normal muscle strength   Upper Extremities No clubbing, No cyanosis   Lower Extremities Pulses 2+ and equal bilaterally, No edema   Skin Warm and dry, No rashes or lesions   Neurologic / Psychiatric Alert and Oriented   Speech Normal speech     ALLERGIES, ADVERSE REACTIONS,  ALERTS  Reconcile with Patient's ChartALLERGIES, ADVERSE REACTIONS, ALERTS  Type Substance Reaction Severity Status   Allergies BenadryL - Drug Class heart palpitations, sob Moderate Active   Allergies Betadine - Drug Class rashes Moderate Active   Allergies Erythromycin, [RxNorm: 342243]   Mild Active   Allergies Latex, [RxNorm: 8391860]   Mild Active   Allergies Morphine, [RxNorm: 845039]   Mild Active   Allergies Povidone Iodine, [RxNorm: 8611]   Mild Active   Allergies Pseudoephedrine, [RxNorm: 227869]   Mild Active     MEDICATIONS ADMINISTERED DURING VISIT    No data available    MEDICATIONS  Reconcile with Patient's ChartMEDICATIONS  Medication Start Date Route/Frequency Status   dilTIAZem  mg capsule,extended release 24 hr, [RxNorm: 808554] 10/13/2023 Take 1 capsule orally once a day. Active   famotidine 20 mg tablet, [RxNorm: 047867] 10/18/2023 Take orally once a day. Active   Multiple Vitamin (Tab-A-Young/Beta Carotene) Tab, [RxNorm: 0] 10/4/2021 Take 1 tablet by mouth daily. Active   Xarelto 20 mg tablet, [RxNorm: 2057809] 6/19/2023 Take 1 tablet orally once a day. Active   Xeloda 500 mg tablet, [RxNorm: 389695] 10/18/2023 4 tabs orally 4 times a day. Active   ZyrTEC 10 mg tablet, [RxNorm: 6299847] 1/20/2023 Take 1 tablet orally once a day. Active     ASSESSMENT    Atrial fibrillation, paroxysmal  No evidence of recurrences, has palpitations with a lot of stress in her life with her kids and illnesses  Some increased palpitations with chemotherapy occasional shortness of breath with Keytruda infusion  Echo shows EF normal and she has a protocol for follow-up echo as per her oncologist  Will defer MCT monitor at this time and focus on chemotherapy if symptoms increase would have come in for MCT monitor2 total documented episode requiring cardioversion October 2020 the next 1 to [place] October 2021 converting in the ER with medications  Continue on diltiazem did talk about other rhythm control strategy  such as antiarrhythmic or ablation if recurrences we will continue to monitorDyspnea and exertion  Improved  No indication of A. fib or heart failure associated with the symptoms  Echocardiogram EF 67% to 55% with stable strain rate no diastolic dysfunction  Follow-up echocardiogram after initial chemotherapy shows EF 66% and global strain rate -19.1% and then 20% followed by 19.7% and now 18.2 we will recheck in 6 months  Pulmonary pressures mildly elevated postchemotherapy. Follow-up echo in 6 monthsBreast cancer  Is triple negative breast cancer  Has Port-A-Cath in the right side was removed and replaced does have right arm DVT January 2023  Status post thrombectomy now on Xarelto  finished chemotherapy and radiation  Starting oral chemotherapy for 6 months  Echo EF and strains have been in the normal range last month slightly lower  Recheck in 6 months, no symptoms  Has protocol for follow-up echocardiograms with strain ratePlan  Follow-up 6 months  Echo with strain prior to follow-up     FAMILY HISTORY    FAMILY HISTORY  Relationship Age Diagnosis   Father 0 No history of Family history of heart disease - FHx   Mother 0 No history of Family history of heart disease - FHx     GENERAL STATUS    No data available    PAST MEDICAL HISTORY    PAST MEDICAL HISTORY  Problem Date diagonsed Date resolved Status   Palpitations, [SNOMED-CT: 35622394] 1/20/2023 - Active   Atrial fibrillation (Afib), paroxysmal - A Fib, [SNOMED-CT: 085993389] 10/29/2020 - Active   History of DVT (deep vein thrombosis) - Hx, [SNOMED-CT: 032056984] 1/17/2023 - Active   OVERTON (dyspnea on exertion), [SNOMED-CT: 59756120] 1/5/2022 - Active   Breast Cancer, [SNOMED-CT: 516279583] 12/2/2022 - Active   Encounter for monitoring cardiotoxic drug therapy, [SNOMED-CT: 366631233] 12/2/2022 - Active     HISTORY OF PRESENT ILLNESS    Patient is here for follow-up regarding paroxysmal atrial fibrillation and ongoing breast cancer treatment.She is status post  lumpectomy after receiving chemotherapy. She to switch to oral chemotherapy for 6 months. She has a permacath on the right and an old hematoma scar on the right also. She did have lymph node biopsy a conclusive from a PET scan in August 2023.She had some palpitations specially when she is under a lot of stress her daughter is heart transplant recipient on immunosuppressive and has diffuse cancer related to this and is starting chemotherapy. Also her son is getting spinal surgery and has a spot on his thyroid they are not sure ongoing work-up for this.Her right arm has improved mild swelling with the history of thrombus that was treated. Her right chest shows the old port site had a large hematoma apparently and is slowly resolving and the new site looks healed. Echo with strain rate is stable at -19.7 with an EF of 55% range between 19 and 20 the last year. She has occasional palpitations but no sustained episodes such as A-fib. No chest pain or shortness of breath.Since last visit she was diagnosed with triple negative breast cancer in the fall 2022 she started chemotherapy she had a port in the right side and she was actually at [facility] recently with a right arm venous thrombus. Dr. Hdez did thrombectomy and she is now on Xarelto. She did have a port also removed from the right side and put into a different place on the right side she has a moderate hematoma over the old site. During this time she has had some increase in palpitations but they have been intermittent slight and not long-lasting and nothing sustained the may be A. fib or ectopy is unclear. We talked about different monitoring devices and issues with this but advised her just to focus on breast cancer treatment chemotherapy and will do any monitor at this time will have shorter follow-up. Her cancer treatment is at [facility].Previously noted: Originally had to be cardioverted by Dr. Chaves with the patient had October 2020.  She had an echocardiogram July 2022 with normal LV function 67% mild to moderate LVH with normal diastolic function. Left atrial diameter moderately increased and pulmonary pressures at 40 mmHg at most. She has no recurrent A. fib or palpitations no lightheadedness dizziness syncope. Only has a rare individual palpitation but nothing consistent with A. fib.Last echo in October 2020 was okay with normal EF and now this 1 July 2022 EF 67%.  She did have a Covid pneumonia in April 2021 no A. fib during this episode and had a good recovery. She was in [place] in October 2021 and had an episode of A. fib she did call we talked on the phone not much that can be done in the ER was for discharge in A. fib it gave her some medication she finally converted. She had no recurrences since then.At the office visit he recommended event monitor and a stress echo and then consideration for  changing medical therapy with APN follow-up but patient had followed through and now makes an  appointment for EP follow-up myself.  At the time of the ER visit it was noted reporting intermittent palpitations since this morning associated  with upper chest pressure. Patient states that it feels like her heart is flip flopping and beating harder  and faster than normal. Patient reports having had some palpitations in the past but never this faster  this severe. Previous work-up did not reveal any arrhythmias. No cough, no fever, no lower extremity  edema, no syncope, no sore throat, no changes in the sense of smell or taste, no sick contacts, no  calf pain, no abdominal pain, no other associated symptoms.     IMMUNIZATIONS    No data available    PLAN OF CARE    PLAN OF CARE  Planned Care Date   Echocardiography with GLS 1/1/1900   Follow up visit - Vic Camacho 9/4/2024     PROCEDURES    No data available    RESULTS    RESULTS  Name Result Date Location - Ordered By   Trans Thoracic Echocardiogram 1.The left ventricle is normal in size.  Moderate left ventricular hypertrophy. Global left ventricular systolic function is normal. The left ventricular ejection fraction is 60%. Left ventricular diastolic function is indeterminate. GLS -18.2%.2.The left atrial diameter is moderately increased. 10/11/2023 1:30:00 PM Vic Camacho     REVIEW OF SYSTEMS    REVIEW OF SYSTEMS  Header Details   Cardiovascular Palpitations, Edema  No history of Chest pain, OVERTON, Syncope, PND, Orthopnea, Claudication   Respiratory No history of SOB, Wheezing, Sputum   Hem/Lymphatic Easy bruising  No history of Blood clots, Hx of blood transfusion, Anemia, Bleeding problems     SOCIAL HISTORY    SOCIAL HISTORY  Social History Element Description Effective Dates   Smoking status Former smoker -     FUNCTIONAL STATUS    No data available    INSTRUCTIONS    INSTRUCTIONS  NAME TYPE DATE   Increased BMI: Provide patient with information regarding diet and lifestyle changes. Goals 10/18/2023     MEDICAL EQUIPMENT    No data available    Goals Sections    No data available    REASON FOR REFERRAL               Health Concerns Section    No data available    COGNITIVE/MENTAL STATUS    No data available    Patient Demographics    Patient Demographics  Patient Address Patient Name Communication   [address] [place] [address] Yes[place] [phone] (Mobile)     Patient Demographics  Language Race / Ethnicity Marital Status   Unknown White / Unknown      Document Information    Primary Care Provider Other Service Providers Document Coverage Dates   Doug Ho  NPI: [id number]  [phone] (Work)  [address], [address], [place] [address]  [place] [address]  Interpreting Physicians  [facility]  [phone] (Work)  [address] [place] [address] Juan Crespo  NPI: [id number]  [phone] (Work)  [address], [address], [place] [address]  [place] [address]  Nurses     Ashley MEEKS  Carmenenedina  NPI: [id number]  [phone] (Work)  [address], [address], [place] [address]  [place] [address]  Nurses     Alex MEEKS Sigrid  NPI: [id number]  [phone] (Work)  [address], [address], [place] [address]  [place] [address]  Nurses Oct. 18, 2023October 18, 2023      Organization   [facility]  [phone] (Work)  [address], [address], [place] [address]  [place] [address]     Encounter Providers Encounter Date    Oct. 18, 2023October 18, 2023     Legal Authenticator    Vic Camacho  NPI: [id number]  [phone] (Work)  [address] [place] [address], [address], [place] [address]  [place] [address]

## 2024-09-01 LAB
ANION GAP SERPL CALC-SCNC: 10 MMOL/L (ref 0–18)
BASOPHILS # BLD AUTO: 0.01 X10(3) UL (ref 0–0.2)
BASOPHILS NFR BLD AUTO: 0.2 %
BUN BLD-MCNC: 11 MG/DL (ref 9–23)
CALCIUM BLD-MCNC: 8.8 MG/DL (ref 8.7–10.4)
CHLORIDE SERPL-SCNC: 107 MMOL/L (ref 98–112)
CO2 SERPL-SCNC: 24 MMOL/L (ref 21–32)
CREAT BLD-MCNC: 0.6 MG/DL
EGFRCR SERPLBLD CKD-EPI 2021: 110 ML/MIN/1.73M2 (ref 60–?)
EOSINOPHIL # BLD AUTO: 0.07 X10(3) UL (ref 0–0.7)
EOSINOPHIL NFR BLD AUTO: 1.5 %
ERYTHROCYTE [DISTWIDTH] IN BLOOD BY AUTOMATED COUNT: 21 %
GLUCOSE BLD-MCNC: 85 MG/DL (ref 70–99)
HCT VFR BLD AUTO: 26.2 %
HGB BLD-MCNC: 8.1 G/DL
IMM GRANULOCYTES # BLD AUTO: 0.03 X10(3) UL (ref 0–1)
IMM GRANULOCYTES NFR BLD: 0.6 %
LYMPHOCYTES # BLD AUTO: 0.72 X10(3) UL (ref 1–4)
LYMPHOCYTES NFR BLD AUTO: 15.2 %
MCH RBC QN AUTO: 31.8 PG (ref 26–34)
MCHC RBC AUTO-ENTMCNC: 30.9 G/DL (ref 31–37)
MCV RBC AUTO: 102.7 FL
MONOCYTES # BLD AUTO: 0.54 X10(3) UL (ref 0.1–1)
MONOCYTES NFR BLD AUTO: 11.4 %
NEUTROPHILS # BLD AUTO: 3.38 X10 (3) UL (ref 1.5–7.7)
NEUTROPHILS # BLD AUTO: 3.38 X10(3) UL (ref 1.5–7.7)
NEUTROPHILS NFR BLD AUTO: 71.1 %
OSMOLALITY SERPL CALC.SUM OF ELEC: 291 MOSM/KG (ref 275–295)
PLATELET # BLD AUTO: 436 10(3)UL (ref 150–450)
POTASSIUM SERPL-SCNC: 3.9 MMOL/L (ref 3.5–5.1)
POTASSIUM SERPL-SCNC: 3.9 MMOL/L (ref 3.5–5.1)
RBC # BLD AUTO: 2.55 X10(6)UL
SODIUM SERPL-SCNC: 141 MMOL/L (ref 136–145)
TSI SER-ACNC: 2.21 MIU/ML (ref 0.55–4.78)
WBC # BLD AUTO: 4.8 X10(3) UL (ref 4–11)

## 2024-09-01 PROCEDURE — 99233 SBSQ HOSP IP/OBS HIGH 50: CPT | Performed by: STUDENT IN AN ORGANIZED HEALTH CARE EDUCATION/TRAINING PROGRAM

## 2024-09-01 PROCEDURE — 99291 CRITICAL CARE FIRST HOUR: CPT | Performed by: INTERNAL MEDICINE

## 2024-09-01 RX ORDER — DILTIAZEM HYDROCHLORIDE 30 MG/1
60 TABLET, FILM COATED ORAL EVERY 6 HOURS SCHEDULED
Status: DISCONTINUED | OUTPATIENT
Start: 2024-09-01 | End: 2024-09-01

## 2024-09-01 RX ORDER — DILTIAZEM HYDROCHLORIDE 120 MG/1
240 CAPSULE, EXTENDED RELEASE ORAL DAILY
Status: DISCONTINUED | OUTPATIENT
Start: 2024-09-01 | End: 2024-09-01

## 2024-09-01 RX ORDER — AZITHROMYCIN 250 MG/1
500 TABLET, FILM COATED ORAL DAILY
Status: COMPLETED | OUTPATIENT
Start: 2024-09-02 | End: 2024-09-02

## 2024-09-01 RX ORDER — METOPROLOL TARTRATE 1 MG/ML
5 INJECTION, SOLUTION INTRAVENOUS EVERY 6 HOURS
Status: DISCONTINUED | OUTPATIENT
Start: 2024-09-01 | End: 2024-09-02

## 2024-09-01 RX ORDER — DIGOXIN 0.25 MG/ML
250 INJECTION INTRAMUSCULAR; INTRAVENOUS ONCE
Status: COMPLETED | OUTPATIENT
Start: 2024-09-01 | End: 2024-09-01

## 2024-09-01 RX ORDER — LEVALBUTEROL TARTRATE 45 UG/1
2 AEROSOL, METERED ORAL EVERY 6 HOURS
Status: DISCONTINUED | OUTPATIENT
Start: 2024-09-01 | End: 2024-09-05

## 2024-09-01 RX ORDER — DIGOXIN 0.25 MG/ML
250 INJECTION INTRAMUSCULAR; INTRAVENOUS ONCE
Status: DISCONTINUED | OUTPATIENT
Start: 2024-09-01 | End: 2024-09-05

## 2024-09-01 NOTE — PLAN OF CARE
Assumed care at 0730  A/Ox4. RA. Afib on tele   C/o back pain- declines pain meds at this time  Fentanyl patch R arm   R chest port a cath- CDI  Unit draw  Cardizem drip infusing at 20mg/hr  IV Rocephin q24  IV Zithro q24  Cardiac EP   Regular diet   Safety precautions in place.  at bedside. All needs met at this time

## 2024-09-01 NOTE — PLAN OF CARE
Assumed care @ 1930  A&ox4  RA  Afib on tele  Port right upper chest  Unit draw  Cardizem drip titration  Meds in MAR given  Family at bedside  Family does pt wound care per pt request for her left breast   Refused potassium pills  Cardiac protocol  Reg diet  Norco for pain PRN  Safety precautions in place  Continuing to meet pt needs  Need further details reference flowsheets

## 2024-09-01 NOTE — CONSULTS
[facility]  Pulmonary/Critical Care Consult note    Yesenia Daniels Patient Status:  Inpatient    1975 MRN [id number]   Location [facility] 3NE-A Attending Monica Loyola MD   Hosp Day # 1 PCP Eduardo Brooks MD     Reason for Consultation:  Shortness of breath     History of Present Illness:  Yesenia Daniels is a a(n) 49 year old female with history of metastatic breast cancer to lungs and brain, atrial fibrillation, anemia.  She presented to the emergency room with shortness of breath that started 3 days ago.  No fevers, chills, nausea, vomiting, diarrhea or constipation.  No chest pain but did have some palpitations.  No dizziness, lightheadedness, or syncope.  No numbness or ting of extremities or any focalized weakness.  The patient was not noted to have atrial fibrillation with rapid ventricular response.  The patient has been on Xarelto previously    CT chest performed did not show pulmonary embolism but showed lung masses with right middle lobe consolidation and mediastinal adenopathy so pulmonary consultation was obtained for further evaluation and management.    The patient admits to dyspnea on exertion.  She has intermittent cough but denies any hemoptysis, wheezing, chest pains, fever or chills.  Denies abdominal pain, diplopia or dizziness.      History:  Past Medical History:    Arrhythmia    Asthma (HCC)    Back problem    Breast cancer (HCC)    Cancer (HCC)    Shortness of breath    Visual impairment     Past Surgical History:   Procedure Laterality Date    Cholecystectomy      Endometrial ablation            x5    Removal gallbladder       Family History   Problem Relation Age of Onset    Heart Disorder Father     Cancer Father     Heart Disorder Mother     Heart Disorder Maternal Grandmother     Heart Disorder Maternal Grandfather     Heart Disorder Paternal Grandmother     Heart Disorder Sister       reports that she has never smoked. She has never used smokeless  tobacco. She reports that she does not drink alcohol and does not use drugs.    Allergies:  Allergies   Allergen Reactions    Antihistamine & Nasal Deconges [Fexofenadine-Pseudoephedrine] PALPITATIONS    Benadryl [Diphenhydramine] PALPITATIONS and SHORTNESS OF BREATH    Maalox Advanced Max St [Antacid] PALPITATIONS and SHORTNESS OF BREATH    Morphine ANXIETY     Tolerates hydrocodone    Sudafed [Pseudoephedrine] PALPITATIONS and SHORTNESS OF BREATH    Betadine [Povidone Iodine] RASH    Erythromycin NAUSEA AND VOMITING    Latex RASH    Other OTHER (SEE COMMENTS)     Morphine makes her loopy,benedryl as well    Vitamin E RASH       Medications:    Current Facility-Administered Medications:     Levalbuterol Tartrate (XOPENEX HFA) inhaler  2 puff, Inhalation, q6h    [START ON 9/2/2024] azithromycin (Zithromax) tab 500 mg, 500 mg, Oral, Daily    metoprolol (Lopressor) 5 mg/5mL injection 5 mg, 5 mg, Intravenous, Q6H    digoxin (Lanoxin) 250 MCG/ML injection 250 mcg, 250 mcg, Intravenous, Once    dilTIAZem (cardIZEM) 100 mg in sodium chloride 0.9% 100 mL IVPB-ADDV, 2.5-20 mg/hr, Intravenous, Continuous    famotidine (Pepcid) tab 20 mg, 20 mg, Oral, BID    rivaroxaban (Xarelto) tab 20 mg, 20 mg, Oral, Daily with dinner    tiZANidine (Zanaflex) tab 4 mg, 4 mg, Oral, TID PRN    melatonin tab 3 mg, 3 mg, Oral, Nightly PRN    acetaminophen (Tylenol Extra Strength) tab 500 mg, 500 mg, Oral, Q4H PRN    ondansetron (Zofran) 4 MG/2ML injection 4 mg, 4 mg, Intravenous, Q6H PRN    prochlorperazine (Compazine) 10 MG/2ML injection 5 mg, 5 mg, Intravenous, Q8H PRN    polyethylene glycol (PEG 3350) (Miralax) 17 g oral packet 17 g, 17 g, Oral, Daily PRN    sennosides (Senokot) tab 17.2 mg, 17.2 mg, Oral, Nightly PRN    bisacodyl (Dulcolax) 10 MG rectal suppository 10 mg, 10 mg, Rectal, Daily PRN    fleet enema (Fleet) rectal enema 133 mL, 1 enema, Rectal, Once PRN    heparin (Porcine) 100 Units/mL lock flush 500 Units, 5 mL, Intravenous,  PRN    cefTRIAXone (Rocephin) 2 g in sodium chloride 0.9% 100 mL IVPB-ADDV, 2 g, Intravenous, Daily    HYDROcodone-acetaminophen (Norco) 5-325 MG per tab 1 tablet, 1 tablet, Oral, Q6H PRN    HYDROcodone-acetaminophen (Norco) 5-325 MG per tab 1 tablet, 1 tablet, Oral, Q4H PRN **OR** HYDROcodone-acetaminophen (Norco) 5-325 MG per tab 2 tablet, 2 tablet, Oral, Q4H PRN    LORazepam (Ativan) tab 0.5 mg, 0.5 mg, Oral, BID PRN    fluticasone furoate (Arnuity Ellipta) 100 MCG/ACT inhaler 1 puff, 1 puff, Inhalation, Daily    HYDROmorphone (Dilaudid) 1 MG/ML injection 0.2 mg, 0.2 mg, Intravenous, Q4H PRN **OR** HYDROmorphone (Dilaudid) 1 MG/ML injection 0.4 mg, 0.4 mg, Intravenous, Q4H PRN **OR** HYDROmorphone (Dilaudid) 1 MG/ML injection 0.8 mg, 0.8 mg, Intravenous, Q4H PRN    potassium chloride (Klor-Con M20) tab 40 mEq, 40 mEq, Oral, Once    fentaNYL (Duragesic) 50 MCG/HR patch 1 patch, 1 patch, Transdermal, Q72H    sodium hypochlorite (Dakin's) 0.125 % external solution, , Topical, PRN    loratadine (Claritin Reditabs) oral disintegrating tablet TBDP 10 mg, 10 mg, Oral, BID    Intake/Output:  No intake or output data in the 24 hours ending 09/01/24 1300   Body mass index is 47.9 kg/m².    Review of Systems  Review of Systems:   A comprehensive 10 point review of systems was completed.  Pertinent positives and negatives noted in the the HPI.          Patient Vitals for the past 24 hrs:   BP Temp Temp src Pulse Resp SpO2   09/01/24 1211 -- -- -- 120 -- --   09/01/24 1210 105/70 98.3 °F (36.8 °C) Oral 93 17 96 %   09/01/24 1209 -- -- -- 109 -- --   09/01/24 1208 -- -- -- 108 -- --   09/01/24 1207 -- -- -- 109 -- --   09/01/24 0835 131/64 98.7 °F (37.1 °C) Oral 95 17 96 %   09/01/24 0800 -- -- -- 96 -- 93 %   09/01/24 0600 -- -- -- 96 -- 92 %   09/01/24 0430 (!) 171/97 98 °F (36.7 °C) Oral 89 18 93 %   09/01/24 0400 (!) 156/106 -- -- 86 -- 94 %   09/01/24 0200 -- -- -- 117 -- 93 %   09/01/24 0030 129/69 98.2 °F (36.8 °C) Oral  92 19 94 %   09/01/24 0000 -- -- -- 103 -- 99 %   08/31/24 2000 146/88 98.5 °F (36.9 °C) Oral 84 18 94 %   08/31/24 1522 138/83 97.5 °F (36.4 °C) Oral 88 18 98 %     Vitals:    09/01/24 1208 09/01/24 1209 09/01/24 1210 09/01/24 1211   BP:   105/70    BP Location:   Left arm    Pulse: 108 109 93 120   Resp:   17    Temp:   98.3 °F (36.8 °C)    TempSrc:   Oral    SpO2:   96%    Weight:       Height:             Physical Exam  Constitutional:       General: She is not in acute distress.     Appearance: Normal appearance. She is obese. She is not ill-appearing or diaphoretic.   HENT:      Head: Normocephalic and atraumatic.      Nose: Nose normal. No congestion or rhinorrhea.      Mouth/Throat:      Mouth: Mucous membranes are moist.      Pharynx: Oropharynx is clear. No oropharyngeal exudate or posterior oropharyngeal erythema.      Comments: Mallampati class IV palate   Eyes:      Extraocular Movements: Extraocular movements intact.      Pupils: Pupils are equal, round, and reactive to light.   Cardiovascular:      Rate and Rhythm: Normal rate.      Pulses: Normal pulses.      Heart sounds: Normal heart sounds. No murmur heard.  Pulmonary:      Effort: Pulmonary effort is normal. No respiratory distress.      Breath sounds: Normal breath sounds. No wheezing or rhonchi.   Chest:      Chest wall: No tenderness.   Abdominal:      General: Abdomen is flat. Bowel sounds are normal.      Palpations: Abdomen is soft.   Musculoskeletal:         General: Normal range of motion.      Right lower leg: Edema present.      Left lower leg: Edema present.   Skin:     General: Skin is warm.   Neurological:      General: No focal deficit present.      Mental Status: She is alert and oriented to person, place, and time.   Psychiatric:         Mood and Affect: Mood normal.         Behavior: Behavior normal.         Thought Content: Thought content normal.         Judgment: Judgment normal.             Lab Data Review:  Recent Labs   Lab  08/31/24  0352 09/01/24  0506   WBC 4.3 4.8   HGB 8.3* 8.1*   HCT 26.2* 26.2*   .0 436.0       Recent Labs   Lab 08/31/24  0352 08/31/24  1610 09/01/24  0506     --  141   K 3.6 3.8 3.9  3.9     --  107   CO2 24.0  --  24.0   BUN 14  --  11   CREATSERUM 0.56  --  0.60   CA 9.2  --  8.8   ALB 3.6  --   --    ALKPHO 64  --   --    ALT 23  --   --    AST 19  --   --    GLU 92  --  85       No results for input(s): \"MG\" in the last 168 hours.    No results found for: \"PHOS\", \"PHOSPHORUS\"     No results for input(s): \"PT\", \"INR\", \"PTT\" in the last 168 hours.    No results for input(s): \"ABGPHT\", \"UGWXPW4U\", \"RTQWJ6Q\", \"ABGHCO3\", \"ABGBE\", \"TEMP\", \"ESTHER\", \"SITE\", \"DEV\", \"THGB\" in the last 168 hours.    No results for input(s): \"TROP\", \"CKMB\" in the last 168 hours.    Cultures:   Hospital Encounter on 08/31/24   1. Blood Culture     Status: None (Preliminary result)    Collection Time: 08/31/24  4:12 AM    Specimen: Blood,peripheral   Result Value Ref Range    Blood Culture Result No Growth 1 Day N/A           Radiology personally reviewed:  CTA CHEST (CPT=71275)    Result Date: 8/31/2024  CONCLUSION:  1. No pulmonary embolus or aortic dissection. 2. Extensive tumor extending to involve the left breast and less skin.  There is extensive lymphadenopathy within the hilar regions, supraclavicular region, mediastinum, hilum.  There is masslike lesion in the central aspect of the right lung extending into the right lower lobe with complete atelectasis of the right middle lobe underlying tumor not excluded in the right middle lobe consolidation/collapse.  There is a soft tissue mass noted in the gastrohepatic ligament noted.  Findings are consistent with metastatic disease given the history.    Preliminary interpretation was performed by [facility]. Final report agrees with preliminary interpretation without discrepancy.      LOCATION:  [place] [address]   Dictated by (CST): Manuelito Reid MD on 8/31/2024 at  12:20 PM     Finalized by (CST): Manuelito Reid MD on 8/31/2024 at 12:27 PM       XR CHEST AP PORTABLE  (CPT=71045)    Result Date: 8/31/2024  CONCLUSION:  Masslike consolidation in the right lung base medially.  Bilateral pulmonary infiltrates/atelectasis.   LOCATION:  [facility]      Dictated by (CST): Sasha Caballero MD on 8/31/2024 at 7:21 AM     Finalized by (CST): Sasha Caballero MD on 8/31/2024 at 7:22 AM         Patient Active Problem List   Diagnosis    Atrial fibrillation with rapid ventricular response (HCC)    Epigastric abdominal pain    Hyponatremia    Leukocytosis    Chest pressure    Acute deep vein thrombosis (DVT) of axillary vein of right upper extremity (HCC)    Acute deep vein thrombosis (DVT) of brachial vein of right upper extremity (HCC)    Open wound of left breast    Metastasis from breast cancer (HCC)    Sloughing of wound    Allergic reaction    Localized edema    Atrial fibrillation with RVR (HCC)    Community acquired pneumonia, unspecified laterality    Anemia, unspecified type    History of breast cancer       Assessment:  Shortness of Breath: Suspect related to metastatic breast cancer to lungs as well as atrial fibrillation with rapid ventricular response  Atrial fibrillation with rapid ventricular response  Right middle lobe consolidation, suspect due to metastatic breast cancer, though patient is at risk post obstructive pneumonia   Right lower lobe lung and perihilar masses: CT chest on 8/31/2024 shows soft tissue mass in the right hilum extending into the right lower lobe and right middle lobe with collapse of the right middle lobe.  Multiple masses are noted in the right lower lobe the largest measures 3 cm. nodular interlobular septa  Lung infiltrate: Asymmetric right lung versus left consistent with lymphangitic spread of metastases. ground-glass opacity in the left upper lobe consistent with pneumonitis versus lymphangitic spread of breast cancer.   Mediastinal adenopathy:  Extensive mediastinal lymphadenopathy within the right left paratracheal spaces, prevascular space, subcarinal lymph node.   MELIA:  Bilateral hilar lymphadenopathy the largest lymph nodes are on the right measuring 2.5 x 2 cm.   Abdominal mass: CT chest shows 3.8 x 3.5 cm mass in the gastrosplenic ligament.   Left breast tumor infiltrating ductal carcinoma originally diagnosed in November 2022 status post chemotherapy: On 1/18/2024 the [facility] reports shows Left BrCA Clinical Stage IIIB T2 N1 Invasive Ductal Gr 3 ER 1 WV Her2 1+ FISH Negative Ki67 90 TIL 20% .S/P NAC TC x 4   pT1bN0 RCB II Genomics High Luminal B  GeneticsMyriad Pane; BMPR1A & PALB2 VUS : CT chest 831 shows extensive tumor extending to involve the left breast the breast cancer has been metastatic to lungs, and brain by history  Acute deep venous thrombosis of axillary veins right extremity: Currently on Xarelto  Obesity, class III: Body mass index is 47.9 kg/m².:  Mallampati class III palate and history of snoring with daytime sleepiness: Patient appears high risk for obstruct sleep apnea syndrome, ROXANA can cause atrial fibrillation as well  History of asthma  Anemia    Plan:  Agree with current antibiotics  May benefit from surveillance bronchoscopy to evaluate for endobronchial obstruction leading to right middle lobe collapse/consolidation  Oncology consult  Cardiology consult  Split-night sleep study as outpatient to evaluate for obstructive sleep apnea syndrome  Continue Xarelto  DVT prophylaxis: Xarelto 20 mg daily  GI prophylaxis:  Pepcid 20 mg oral twice daily  Will follow for further recommendations    Thank You for allowing me to participate in this patient's care     Cristino Fowler MD    Spent  39   minutes of Critical Care time (exclusive of billable procedures) in evaluation, management and complex clinical decision making. This also included extensive discussion with the patient, family, and clinical staff for care  co-ordination      Note to the patient: The 21st Century Cures Act makes medical notes like these available to patients in the interest of transparency. However, be advised that this is a medical document. It is intended as peer to peer communication. It is written in medical language and may contain abbreviations or verbiage that are unfamiliar. It may appear blunt or direct. Medical documents are intended to carry relevant information, facts as evident, and clinical opinion of the practitioner.      Disclaimer: Components of this note were documented using voice recognition system and are subject to errors not corrected at proofreading. Contact the author of this note for any clarifications

## 2024-09-01 NOTE — PROGRESS NOTES
Progress Note  Yesenia HODGES Jose Alfredo Richey Patient Status:  Inpatient    1975 MRN [id number]   Location [facility] 3NE-A Attending Monica Loyola MD   Hosp Day # 1 PCP Eduardo Brooks MD     Subjective:  Pt states her palpitations have improved. HR remain elevated. She is concerned about increased Xarelto dose with oozing last night from her     Objective:  /64 (BP Location: Left arm)   Pulse 95   Temp 98.7 °F (37.1 °C) (Oral)   Resp 17   Ht 5' 8\" (1.727 m)   Wt (!) 315 lb (142.9 kg)   SpO2 96%   BMI 47.90 kg/m²     Telemetry: afib, uncontrolled rates    Intake/Output:  No intake or output data in the 24 hours ending 24 1053    Last 3 Weights   24 0823 (!) 315 lb (142.9 kg)   24 0332 (!) 315 lb (142.9 kg)   24 1006 285 lb (129.3 kg)   23 1657 286 lb (129.7 kg)       Labs:  Recent Labs   Lab 24  0352 24  1610 24  0506   GLU 92  --  85   BUN 14  --  11   CREATSERUM 0.56  --  0.60   EGFRCR 112  --  110   CA 9.2  --  8.8     --  141   K 3.6 3.8 3.9  3.9     --  107   CO2 24.0  --  24.0     Recent Labs   Lab 24  0352 24  0506   RBC 2.65* 2.55*   HGB 8.3* 8.1*   HCT 26.2* 26.2*   MCV 98.9 102.7*   MCH 31.3 31.8   MCHC 31.7 30.9*   RDW 21.3 21.0   NEPRELIM 2.88 3.38   WBC 4.3 4.8   .0 436.0         Recent Labs   Lab 24  0352 24  0529   TROPHS 12 12       Diagnostics:  No results found.   Review of Systems   Cardiovascular:  Positive for palpitations. Negative for chest pain, dyspnea on exertion, leg swelling and orthopnea.   Respiratory:  Negative for cough and shortness of breath.        Physical Exam:    Gen: alert, oriented x 3, NAD  Heent: pupils equal, reactive. Mucous membranes moist.   Neck: no jvd  Cardiac: irregular rate and rhythm, normal S1,S2, no murmur, clicks, rub or gallop  Lungs: coarse bilaterally, diminished BS  Abd: soft, NT/ND +bs  Ext: no edema  Skin: Warm, dry, L upper chest and back w/wounds r/t  cancer  Neuro: No focal deficits      Medications:     Levalbuterol Tartrate  2 puff Inhalation q6h    famotidine  20 mg Oral BID    rivaroxaban  20 mg Oral Daily with dinner    cefTRIAXone  2 g Intravenous Daily    azithromycin  500 mg Intravenous Q24H    fluticasone furoate  1 puff Inhalation Daily    potassium chloride  40 mEq Oral Once    fentaNYL  1 patch Transdermal Q72H    loratadine  10 mg Oral BID      dilTIAZem 20 mg/hr (09/01/24 0626)       Assessment:  Paroxysmal Atrial Fibrillation, RVR  Felt onset w/palpitations, SOB 8/31   Hx DCCV 2020  [facility] Echo 10/2023 LVEF 60%, mod increased LA size  8/31 Flecainide 200mg x1 - no known CAD  Remains in afib, HR still elevated 110's-140's  Historically on diltiazem 240mg po daily at home  [id number] 2 (female, HTN): on Xarelto 20mg daily  Suspected PNA  On antibx  Asthma  Albuterol prn   Stage IV Metastatic Breast Cancer w/cutaneous involvement to chest  Chronic Anemia - Hgb 8.1  Hx RUE DVT - on xarelto    Plan:  Check TSH today  Remains in afib with elev rates - continue IV diltiazem gtt per protocol. Historically on po diltiazem 240mg po daily at home - will resume and hopefully be able to wean IV diltiazem gtt.  Continue Xarelto 20mg po daily for stroke prophlaxis - pt is concerned about oozing from chest wound last night and chronic anemia. No active bleeding noted. Will monitor closely.  Will defer to EP colleagues for further input on anti-arrhythmic therapy. DCCV would be challenging d/t pad placement around cutaneous involvement r/t breast cancer on L chest and back.   Anitbiotics per primary    Plan of care discussed with patient, RN.    Andria Bland, APRN  9/1/2024  10:53 AM  [phone] [facility]  [phone] [facility]        Reviewed with Nurse Baldo and UDAY Huerta    Appreciate advice from my EP colleague Dr. Cordova    Will use digoxin for now and consider amiodarone if this fails to improve rates      L2

## 2024-09-01 NOTE — PLAN OF CARE
Discussed with EP and Dr. Zuniga. Will initially attempt IV digoxin to improve rate control. If not effective consider loading amiodarone and attempted cardioversion Tuesday although give cutaneous involvement of her malignancy and its distribution this will be difficult as discussed in alternative documentation.

## 2024-09-01 NOTE — PROGRESS NOTES
[facility]   part of [facility]     Hospitalist Progress Note     Yesenia HODGES Jose Alfredo Richey Patient Status:  Inpatient    1975 MRN [id number]   Location [facility] 3NE-A Attending Monica Loyola MD   Hosp Day # 1 PCP Eduardo Brooks MD     Chief Complaint: Afib     Subjective:     Patient  HR elevated, on CAridzem gtt. Feels SOB . Does not have a pulm.     Objective:    Review of Systems:   A comprehensive review of systems was completed; pertinent positive and negatives stated in subjective.    Vital signs:  Temp:  [97.5 °F (36.4 °C)-98.7 °F (37.1 °C)] 98.7 °F (37.1 °C)  Pulse:  [] 95  Resp:  [17-20] 17  BP: (102-171)/() 131/64  SpO2:  [92 %-99 %] 96 %    Physical Exam:    General: No acute distress  Respiratory: No wheezes, no rhonchi  Cardiovascular: S1, S2, regular rate and rhythm  Abdomen: Soft, Non-tender, non-distended, positive bowel sounds  Neuro: No new focal deficits.   Extremities: No edema      Diagnostic Data:    Labs:  Recent Labs   Lab 24  0352 24  0506   WBC 4.3 4.8   HGB 8.3* 8.1*   MCV 98.9 102.7*   .0 436.0       Recent Labs   Lab 24  0352 24  1610 24  0506   GLU 92  --  85   BUN 14  --  11   CREATSERUM 0.56  --  0.60   CA 9.2  --  8.8   ALB 3.6  --   --      --  141   K 3.6 3.8 3.9  3.9     --  107   CO2 24.0  --  24.0   ALKPHO 64  --   --    AST 19  --   --    ALT 23  --   --    BILT 0.7  --   --    TP 5.9  --   --        Estimated Creatinine Clearance: 114.4 mL/min (based on SCr of 0.6 mg/dL).    Recent Labs   Lab 24  0352 24  0529   TROPHS 12 12       No results for input(s): \"PTP\", \"INR\" in the last 168 hours.               Microbiology    No results found for this visit on 24.      Imaging: Reviewed in Epic.    Medications:    Levalbuterol Tartrate  2 puff Inhalation q6h    famotidine  20 mg Oral BID    rivaroxaban  20 mg Oral Daily with dinner    cefTRIAXone  2 g Intravenous Daily    azithromycin   500 mg Intravenous Q24H    fluticasone furoate  1 puff Inhalation Daily    potassium chloride  40 mEq Oral Once    fentaNYL  1 patch Transdermal Q72H    loratadine  10 mg Oral BID       Assessment & Plan:      #Afib, RVR  On cardizem gtt  Cards on CS  #Suspected PNA  Iv Abx  #Breast ca with skin, lung, CNs mets  Will have pulm see given lung collapse, PNA and pt does not have a pulm   Pain control - continue fentanyl, tolerating Norco for now  #Anemia due to chemo, malignancy   #h/o RUE DVT  DOAC      Monica Loyola MD    Supplementary Documentation:     Quality:  DVT Mechanical Prophylaxis:        DVT Pharmacologic Prophylaxis   Medication    rivaroxaban (Xarelto) tab 20 mg    heparin (Porcine) 100 Units/mL lock flush 500 Units                Code Status: Not on file  Crawford: No urinary catheter in place  Crawford Duration (in days):   Central line:    JACQUIE: 8/31/2024    Discharge is dependent on: clinical   At this point Ms. Daniels is expected to be discharge to: home    The 21st Century Cures Act makes medical notes like these available to patients in the interest of transparency. Please be advised this is a medical document. Medical documents are intended to carry relevant information, facts as evident, and the clinical opinion of the practitioner. The medical note is intended as peer to peer communication and may appear blunt or direct. It is written in medical language and may contain abbreviations or verbiage that are unfamiliar.

## 2024-09-02 LAB
ANION GAP SERPL CALC-SCNC: 6 MMOL/L (ref 0–18)
BUN BLD-MCNC: 7 MG/DL (ref 9–23)
CALCIUM BLD-MCNC: 8.5 MG/DL (ref 8.7–10.4)
CHLORIDE SERPL-SCNC: 107 MMOL/L (ref 98–112)
CO2 SERPL-SCNC: 26 MMOL/L (ref 21–32)
CREAT BLD-MCNC: 0.5 MG/DL
EGFRCR SERPLBLD CKD-EPI 2021: 115 ML/MIN/1.73M2 (ref 60–?)
ERYTHROCYTE [DISTWIDTH] IN BLOOD BY AUTOMATED COUNT: 20.5 %
GLUCOSE BLD-MCNC: 91 MG/DL (ref 70–99)
HCT VFR BLD AUTO: 23.3 %
HGB BLD-MCNC: 7.3 G/DL
MCH RBC QN AUTO: 31.7 PG (ref 26–34)
MCHC RBC AUTO-ENTMCNC: 31.3 G/DL (ref 31–37)
MCV RBC AUTO: 101.3 FL
OSMOLALITY SERPL CALC.SUM OF ELEC: 286 MOSM/KG (ref 275–295)
PLATELET # BLD AUTO: 410 10(3)UL (ref 150–450)
POTASSIUM SERPL-SCNC: 3.5 MMOL/L (ref 3.5–5.1)
RBC # BLD AUTO: 2.3 X10(6)UL
SODIUM SERPL-SCNC: 139 MMOL/L (ref 136–145)
WBC # BLD AUTO: 4.8 X10(3) UL (ref 4–11)

## 2024-09-02 PROCEDURE — 99233 SBSQ HOSP IP/OBS HIGH 50: CPT | Performed by: STUDENT IN AN ORGANIZED HEALTH CARE EDUCATION/TRAINING PROGRAM

## 2024-09-02 PROCEDURE — 99233 SBSQ HOSP IP/OBS HIGH 50: CPT | Performed by: INTERNAL MEDICINE

## 2024-09-02 RX ORDER — METOPROLOL TARTRATE 25 MG/1
25 TABLET, FILM COATED ORAL
Status: DISCONTINUED | OUTPATIENT
Start: 2024-09-02 | End: 2024-09-05

## 2024-09-02 RX ORDER — POTASSIUM CHLORIDE 1500 MG/1
40 TABLET, EXTENDED RELEASE ORAL EVERY 4 HOURS
Status: DISCONTINUED | OUTPATIENT
Start: 2024-09-02 | End: 2024-09-02

## 2024-09-02 RX ORDER — DILTIAZEM HYDROCHLORIDE 30 MG/1
60 TABLET, FILM COATED ORAL EVERY 6 HOURS SCHEDULED
Status: DISCONTINUED | OUTPATIENT
Start: 2024-09-02 | End: 2024-09-05

## 2024-09-02 RX ORDER — FUROSEMIDE 10 MG/ML
20 INJECTION INTRAMUSCULAR; INTRAVENOUS ONCE
Status: COMPLETED | OUTPATIENT
Start: 2024-09-02 | End: 2024-09-02

## 2024-09-02 NOTE — PROGRESS NOTES
[facility]   part of [facility]     Cardiology Progress Note        Yesenia Daniels Patient Status:  Inpatient    1975 MRN [id number]   Location [facility] 3NE-A Attending Monica Loyola MD   Hosp Day # 2 PCP Eduardo Brooks MD     HPI:     Subjective/ROS:  Not sob at rest. Still with fair amt of pulmonary congestion    Objective:  /71 (BP Location: Left arm)   Pulse 79   Temp 98.3 °F (36.8 °C) (Oral)   Resp 15   Ht 5' 8\" (1.727 m)   Wt (!) 315 lb (142.9 kg)   SpO2 90%   BMI 47.90 kg/m²     Temp (24hrs), Av.7 °F (37.1 °C), Min:98.3 °F (36.8 °C), Max:99.1 °F (37.3 °C)      Tele  Afib, rates in 90s  on cardizem gtt and iv bb    Intake/Output:    Intake/Output Summary (Last 24 hours) at 2024 1335  Last data filed at 2024 0306  Gross per 24 hour   Intake 200 ml   Output --   Net 200 ml       Patient Weight for the past 72 hrs:   Weight   24 0332 (!) 315 lb (142.9 kg)   24 0823 (!) 315 lb (142.9 kg)        Physical Exam:    Gen: alert, oriented x 3, NAD  Heent: pupils equal, reactive. Mucous membranes moist.   Neck: no discernible  jvd  Cardiac:irregularly irregular, tachy normal S1,S2  Lungs: coarse bs  Abd: soft, NT/ND +bs  Ext: +  BLEedema  Skin: Warm, dry  Neuro: No focal deficits    Labs:  Lab Results   Component Value Date    WBC 4.8 2024    HGB 7.3 2024    HCT 23.3 2024    .0 2024    CREATSERUM 0.50 2024    BUN 7 2024     2024    K 3.5 2024     2024    CO2 26.0 2024    GLU 91 2024    CA 8.5 2024       CXR: Reviewed image from     Medications:     Levalbuterol Tartrate  2 puff Inhalation q6h    metoprolol  5 mg Intravenous Q6H    digoxin  250 mcg Intravenous Once    famotidine  20 mg Oral BID    rivaroxaban  20 mg Oral Daily with dinner    cefTRIAXone  2 g Intravenous Daily    fluticasone furoate  1 puff Inhalation Daily    potassium chloride  40 mEq Oral  Once    fentaNYL  1 patch Transdermal Q72H    loratadine  10 mg Oral BID      dilTIAZem 20 mg/hr (09/02/24 1238)       Assessment:    PAF with rvr  Rates running in 90s at rest. Remains on cardizem gtt at 20 mg/hr and IV lopressor  Given 2 doses of digoxin yesterday  Xarelto for stroke prophylaxis  Suspected pna  Pulm following. Considering bronch- timing unclear  Stage IV breast ca  Chronic anemia- hgb [place]  RUE dvt- on xarelto    Plan:     Will try to transition to po cardizem and wean gtt as able  Change iv bb to oral   Continue xarelto  Iv lasix x 1      Anisha Dixon NP  [phone]        Patient seen and examined independently.  Note reviewed and labs reviewed.  Agree to the assessment and plan with the following additions/changes.  Entire medical decision making & plan performed by myself.      A/P:  Afib rate control improved  Transition to PO as above. Wean cardizem drip  Xarelto for Afib embolic protection      LOC L3    Justin Laird MD  9/2/2024  Interventional Cardiology  [facility] [place]  =======================================================

## 2024-09-02 NOTE — PLAN OF CARE
End of shift note:  Pt is A&Ox4, denied need for any PRN pain meds overnight. Has fentanyl patch to right upper arm. Room air-w/ productive cough. On tele uncontrolled afib on cardizem gtt. Reports a decreased appetite.  Up w/ SBA to bathroom. Port to right upper chest-unit draw. Left breast dressing changed by daughter which is patients preference. On abx for pneumonia. Pt and family updated on POC, all questions answered.   Problem: CARDIOVASCULAR - ADULT  Goal: Maintains optimal cardiac output and hemodynamic stability  Description: INTERVENTIONS:  - Monitor vital signs, rhythm, and trends  - Monitor for bleeding, hypotension and signs of decreased cardiac output  - Evaluate effectiveness of vasoactive medications to optimize hemodynamic stability  - Monitor arterial and/or venous puncture sites for bleeding and/or hematoma  - Assess quality of pulses, skin color and temperature  - Assess for signs of decreased coronary artery perfusion - ex. Angina  - Evaluate fluid balance, assess for edema, trend weights  Outcome: Progressing     Problem: CARDIOVASCULAR - ADULT  Goal: Absence of cardiac arrhythmias or at baseline  Description: INTERVENTIONS:  - Continuous cardiac monitoring, monitor vital signs, obtain 12 lead EKG if indicated  - Evaluate effectiveness of antiarrhythmic and heart rate control medications as ordered  - Initiate emergency measures for life threatening arrhythmias  - Monitor electrolytes and administer replacement therapy as ordered  Outcome: Progressing     Problem: RESPIRATORY - ADULT  Goal: Achieves optimal ventilation and oxygenation  Description: INTERVENTIONS:  - Assess for changes in respiratory status  - Assess for changes in mentation and behavior  - Position to facilitate oxygenation and minimize respiratory effort  - Oxygen supplementation based on oxygen saturation or ABGs  - Provide Smoking Cessation handout, if applicable  - Encourage broncho-pulmonary hygiene including cough, deep  breathe, Incentive Spirometry  - Assess the need for suctioning and perform as needed  - Assess and instruct to report SOB or any respiratory difficulty  - Respiratory Therapy support as indicated  - Manage/alleviate anxiety  - Monitor for signs/symptoms of CO2 retention  Outcome: Progressing

## 2024-09-02 NOTE — PROGRESS NOTES
[facility]   part of [facility]     Hospitalist Progress Note     Yesenia HODGES Jose Alfredo Richey Patient Status:  Inpatient    1975 MRN [id number]   Location [facility] 3NE-A Attending Monica Loyola MD   Hosp Day # 2 PCP Eduardo Brooks MD     Chief Complaint: Afib     Subjective:     Patient asleep this aM, woken up. Denies pain. No appetite this morning     Objective:    Review of Systems:   A comprehensive review of systems was completed; pertinent positive and negatives stated in subjective.    Vital signs:  Temp:  [98.3 °F (36.8 °C)-99.1 °F (37.3 °C)] 98.7 °F (37.1 °C)  Pulse:  [] 63  Resp:  [16-20] 16  BP: (105-154)/() 135/49  SpO2:  [90 %-96 %] 91 %    Physical Exam:    General: No acute distress  Respiratory: No wheezes, no rhonchi- ausc anteriorly   Cardiovascular: S1, S2, irregular rate and rhythm  Abdomen: Soft, Non-tender, non-distended, positive bowel sounds  Extremities: No edema      Diagnostic Data:    Labs:  Recent Labs   Lab 24  0352 24  0506 24  0528   WBC 4.3 4.8 4.8   HGB 8.3* 8.1* 7.3*   MCV 98.9 102.7* 101.3*   .0 436.0 410.0       Recent Labs   Lab 24  0352 24  1610 24  0506 24  0528   GLU 92  --  85 91   BUN 14  --  11 7*   CREATSERUM 0.56  --  0.60 0.50*   CA 9.2  --  8.8 8.5*   ALB 3.6  --   --   --      --  141 139   K 3.6 3.8 3.9  3.9 3.5     --  107 107   CO2 24.0  --  24.0 26.0   ALKPHO 64  --   --   --    AST 19  --   --   --    ALT 23  --   --   --    BILT 0.7  --   --   --    TP 5.9  --   --   --        Estimated Creatinine Clearance: 137.3 mL/min (A) (based on SCr of 0.5 mg/dL (L)).    Recent Labs   Lab 24  0352 24  0529   TROPHS 12 12       No results for input(s): \"PTP\", \"INR\" in the last 168 hours.               Microbiology    Hospital Encounter on 24   1. Blood Culture     Status: None (Preliminary result)    Collection Time: 24  4:12 AM    Specimen: Blood,peripheral    Result Value Ref Range    Blood Culture Result No Growth 2 Days N/A         Imaging: Reviewed in Epic.    Medications:    Levalbuterol Tartrate  2 puff Inhalation q6h    azithromycin  500 mg Oral Daily    metoprolol  5 mg Intravenous Q6H    digoxin  250 mcg Intravenous Once    famotidine  20 mg Oral BID    rivaroxaban  20 mg Oral Daily with dinner    cefTRIAXone  2 g Intravenous Daily    fluticasone furoate  1 puff Inhalation Daily    potassium chloride  40 mEq Oral Once    fentaNYL  1 patch Transdermal Q72H    loratadine  10 mg Oral BID       Assessment & Plan:      #Afib, RVR  On cardizem gtt  Cards on CS  #Suspected PNA, Lung collapse on CT due to malignancy ?  Pulm on CS  Iv Abx  #Breast ca with skin, lung, CNs mets  Will have pulm see given lung collapse, PNA and pt does not have a pulm   Pain control - continue fentanyl, tolerating Norco for now  #Anemia due to chemo, malignancy   #h/o RUE DVT  DOAC  #GOC  Spoke with spouse as wife voiced wanting hospice. Advised him  to call the pt oncologist and discuss prognosis with him, wife and dtr. SPouse reports they were very surprised by wifes last night when she requested hospice. They do follow with palliative as OP.    Monica Loyola MD    Supplementary Documentation:     Quality:  DVT Mechanical Prophylaxis:        DVT Pharmacologic Prophylaxis   Medication    rivaroxaban (Xarelto) tab 20 mg    heparin (Porcine) 100 Units/mL lock flush 500 Units                Code Status: Not on file  Crawford: No urinary catheter in place  Crawford Duration (in days):   Central line:    JACQUIE: 8/31/2024    Discharge is dependent on: clinical   At this point Ms. Daniels is expected to be discharge to: home    The 21st Century Cures Act makes medical notes like these available to patients in the interest of transparency. Please be advised this is a medical document. Medical documents are intended to carry relevant information, facts as evident, and the clinical opinion of the  practitioner. The medical note is intended as peer to peer communication and may appear blunt or direct. It is written in medical language and may contain abbreviations or verbiage that are unfamiliar.

## 2024-09-02 NOTE — PLAN OF CARE
Contacted by RN. Pt is declining to take xarelto at the current dose (20 mg daily) and is requesting that the dose be reduced due to excessive bleeding following wound dressing changes. She declined last nights dose and will not take tonight's dose either if dose not reduced. Given afib and DVT, would prefer she be given 15 mg nightly than none at all. Sounds as though she is agreeable to this.     Will re-evaluate this with treatment team tomorrow.     Anisha Dixon NP  9/2/2024  3:32 PM  [phone]

## 2024-09-02 NOTE — PROGRESS NOTES
Assumed care at 7:30am  Alert and oriented x4.    at bedside.   RA. Tele on.   Cardizem drip running at 20cc/hr, new bag started.  Patient asking for possible hospice, but wants to hold off on it for now. Will talk to Pulm about bronchoscopy.   Declined pain medications for now.   All safety precautions in place. Will continue to monitor patient.     Refused 15 mg of Xarelto.   Will inform Cardiology in AM.   She wants to take 10mg like she does at home.

## 2024-09-02 NOTE — PROGRESS NOTES
[facility] Pulmonary Progress Note    Yesenia Daniels Patient Status:  Inpatient    1975 MRN [id number]   Location [facility] 3NE-A Attending Monica Loyola MD   Hosp Day # 2 PCP Eduardo Brooks MD     Subjective:  Yesenia Daniels is a(n) 49 year old female who is admitted for shortness of breath respiratory failure.    Overnight: No acute events overnight, wants to leave the hospital (not AMA, just wants to go home)    Objective:  /71 (BP Location: Left arm)   Pulse 79   Temp 98.3 °F (36.8 °C) (Oral)   Resp 15   Ht 5' 8\" (1.727 m)   Wt (!) 315 lb (142.9 kg)   SpO2 90%   BMI 47.90 kg/m²       Temp (24hrs), Av.7 °F (37.1 °C), Min:98.3 °F (36.8 °C), Max:99.1 °F (37.3 °C)      Intake/Output:    Intake/Output Summary (Last 24 hours) at 2024 1353  Last data filed at 2024 0306  Gross per 24 hour   Intake 200 ml   Output --   Net 200 ml       Physical Exam:   General: alert, cooperative, oriented.  No respiratory distress.   Head: Normocephalic, without obvious abnormality, atraumatic.   Throat: Lips, mucosa, and tongue normal.  No thrush noted.   Neck: trachea midline, no adenopathy, no thyromegaly. No JVD.   Lungs: clear to auscultation bilaterally   Chest wall: No tenderness or deformity.   Heart: regular rate and rhythm, S1, S2 normal, no murmur, click, rub or gallop   Abdomen: soft, non-distended, no masses, no guarding, no     Rebound.   Extremity: No edema or cyanosis   Skin: No rashes or lesions.   Neurological: Alert, interactive, no focal deficits    Lab Data Review:  Recent Labs     24  0352 24  0506 24  0528   WBC 4.3 4.8 4.8   HGB 8.3* 8.1* 7.3*   .0 436.0 410.0     Recent Labs     24  0352 24  1610 24  0506 24  0528     --  141 139   K 3.6 3.8 3.9  3.9 3.5     --  107 107   CO2 24.0  --  24.0 26.0   BUN 14  --  11 7*   CREATSERUM 0.56  --  0.60 0.50*     No results for input(s): \"PTP\", \"INR\", \"PTT\" in the last 168  hours.    Cultures:   Hospital Encounter on 08/31/24   1. Blood Culture     Status: None (Preliminary result)    Collection Time: 08/31/24  4:12 AM    Specimen: Blood,peripheral   Result Value Ref Range    Blood Culture Result No Growth 2 Days N/A       Radiology:  CTA CHEST (CPT=71275)  Narrative: PROCEDURE:  CT ANGIOGRAPHY, CHEST (CPT=71275)     COMPARISON:  [facility] , XR, XR CHEST AP PORTABLE  (CPT=71045), 8/31/2024, 3:53 AM.  [facility] , CT, CT CHEST (CPT=71250), 2/17/2023, 6:32 PM.     INDICATIONS:  cancer patient, chest palpitations, and shortness of breath     TECHNIQUE:  IV contrast-enhanced multislice CT angiography is performed through the pulmonary arterial anatomy. 3D volume renderings are generated.  Dose reduction techniques were used. Dose information is transmitted to the ACR (American College of   Radiology) NRDR (National Radiology Data Registry) which includes the Dose Index Registry.     PATIENT STATED HISTORY:(As transcribed by Technologist)  Heart palpitations, dyspnea. History of breast Ca.      CONTRAST USED:  100cc of Isovue 370     FINDINGS:    VASCULATURE:  No pulmonary embolus.  THORACIC AORTA:  No aneurysm or visible dissection.    LUNGS:  There is a soft tissue mass in the right hilum extending into the right lower lobe and right middle lobe with collapse of the right middle lobe.  Multiple masses are noted in the right lower lobe the largest measures 3 cm.  This mass is difficult   to measure due to the extension into the hilum.  There is volume loss on the right.  There are nodular interlobular septa asymmetric right lung versus left consistent with lymphangitic spread of metastases.  There is peribronchial thickening.  There is   some ground-glass opacity in the left upper lobe consistent with pneumonitis.  There are some ground-glass opacities noted in the lower lobes adjacent to the lung masses in the RLL.  Marked narrowing of the bronchi to the RML and RLL centrally related to   the  mass.  MEDIASTINUM:  Extensive mediastinal lymphadenopathy within the right left paratracheal spaces, prevascular space, subcarinal lymph node.  MELIA:  Bilateral hilar lymphadenopathy the largest lymph nodes are on the right measuring 2.5 x 2 cm.  CARDIAC:  No enlargement, pericardial thickening, or significant coronary artery calcification.  PLEURA:  Small right pleural effusion.  CHEST WALL:  Right-sided Port-A-Cath tip in SVC.  Marked nodular skin thickening involving the entire left chest wall and breast marked thickening up to 1.5 cm.  Multiple cysts nodules are noted within left breast extending to the mediastinum.  Left   axillary soft tissue mass measures up to 3.2 cm.  There are supraclavicular lymph nodes and lymph nodes noted measuring up to 2.5 cm.  2.5 cm right axillary enlarged lymph node also noted.  There are numerous smaller axillary lymph nodes seen on the   left.  LIMITED ABDOMEN:  There is a 3.8 x 3.5 cm mass in the gastrosplenic ligament.  BONES:  Mild to moderate multiple level degenerative disc disease.  OTHER:  Negative.                     Impression: CONCLUSION:    1. No pulmonary embolus or aortic dissection.  2. Extensive tumor extending to involve the left breast and less skin.  There is extensive lymphadenopathy within the hilar regions, supraclavicular region, mediastinum, hilum.  There is masslike lesion in the central aspect of the right lung extending   into the right lower lobe with complete atelectasis of the right middle lobe underlying tumor not excluded in the right middle lobe consolidation/collapse.  There is a soft tissue mass noted in the gastrohepatic ligament noted.  Findings are consistent   with metastatic disease given the history.           Preliminary interpretation was performed by [facility]. Final report agrees with preliminary interpretation without discrepancy.                LOCATION:  [place] [address]        Dictated by (CST): Manuelito Reid MD on 8/31/2024  at 12:20 PM       Finalized by (CST): Manuelito Reid MD on 8/31/2024 at 12:27 PM     XR CHEST AP PORTABLE  (CPT=71045)  Narrative: PROCEDURE:  XR CHEST AP PORTABLE  (CPT=71045)     TECHNIQUE:  AP chest radiograph was obtained.     COMPARISON:  None.     INDICATIONS:  cancer patient, chest palpitations, and shortness of breath     PATIENT STATED HISTORY: (As transcribed by Technologist)   cancer patient, chest palpitations, and shortness of breath.         FINDINGS:  Moderate to large region of consolidation in the right lung base medially.  This may be related to acute infection versus underlying pulmonary in mass.  There is ground-glass opacity left mid lung.     Port-A-Cath tip SVC.  No pneumothorax.     Mild blunting both costophrenic angles.     Heart size upper limits of normal.                   Impression: CONCLUSION:  Masslike consolidation in the right lung base medially.     Bilateral pulmonary infiltrates/atelectasis.        LOCATION:  [facility]                 Dictated by (CST): Sasha Caballero MD on 8/31/2024 at 7:21 AM       Finalized by (CST): Sasah Caballero MD on 8/31/2024 at 7:22 AM         Medications reviewed     Assessment and Plan:   Patient Active Problem List   Diagnosis    Atrial fibrillation with rapid ventricular response (HCC)    Epigastric abdominal pain    Hyponatremia    Leukocytosis    Chest pressure    Acute deep vein thrombosis (DVT) of axillary vein of right upper extremity (HCC)    Acute deep vein thrombosis (DVT) of brachial vein of right upper extremity (HCC)    Open wound of left breast    Metastasis from breast cancer (HCC)    Sloughing of wound    Allergic reaction    Localized edema    Atrial fibrillation with RVR (HCC)    Community acquired pneumonia, unspecified laterality    Anemia, unspecified type    History of breast cancer       Assessment:  Shortness of breath, likely multifactorial in setting of progressive breast cancer and right middle lobe consolidation  Right  middle lobe consolidation, concern for postobstructive pneumonia versus malignancy  Multiple masses noted in the right lower lobe with the largest being 3 cm with known malignancy  Mediastinal adenopathy  Abdominal mass, 3.8 x 3.5 cm mass in gastrosplenic  History of left breast tumor with infiltrating ductal carcinoma  History of DVT, currently on Xarelto  History of asthma  Obesity    Plan:  Would continue ongoing antibiotics  We will try to arrange for bronchoscopy in order to try to open up right middle lobe  Recommend oncology consult  Cardiology following for A-fib  Continue Xarelto  We held extensive goals of care discussion at bedside, patient wants to go home and die in peace  Will discuss case with interventional pulmonary to try to open plan to give her some symptom relief, she was okay with this plan  Consider palliative/hospice consult        Milad Ratliff MD  [facility]  Office: [phone]

## 2024-09-03 ENCOUNTER — ANESTHESIA EVENT (OUTPATIENT)
Dept: ENDOSCOPY | Facility: HOSPITAL | Age: 49
End: 2024-09-03
Payer: COMMERCIAL

## 2024-09-03 LAB
ATRIAL RATE: 117 BPM
BASOPHILS # BLD: 0 X10(3) UL (ref 0–0.2)
BASOPHILS NFR BLD: 0 %
BUN BLD-MCNC: 8 MG/DL (ref 9–23)
CALCIUM BLD-MCNC: 8.4 MG/DL (ref 8.7–10.4)
CHLORIDE SERPL-SCNC: 106 MMOL/L (ref 98–112)
CO2 SERPL-SCNC: 27 MMOL/L (ref 21–32)
CREAT BLD-MCNC: 0.48 MG/DL
EGFRCR SERPLBLD CKD-EPI 2021: 116 ML/MIN/1.73M2 (ref 60–?)
EOSINOPHIL # BLD: 0 X10(3) UL (ref 0–0.7)
EOSINOPHIL NFR BLD: 0 %
ERYTHROCYTE [DISTWIDTH] IN BLOOD BY AUTOMATED COUNT: 20.1 %
GLUCOSE BLD-MCNC: 89 MG/DL (ref 70–99)
HCT VFR BLD AUTO: 22.2 %
HGB BLD-MCNC: 7 G/DL
INR BLD: 1.3 (ref 0.8–1.2)
LYMPHOCYTES NFR BLD: 0.18 X10(3) UL (ref 1–4)
LYMPHOCYTES NFR BLD: 3 %
MAGNESIUM SERPL-MCNC: 1.7 MG/DL (ref 1.6–2.6)
MCH RBC QN AUTO: 32 PG (ref 26–34)
MCHC RBC AUTO-ENTMCNC: 31.5 G/DL (ref 31–37)
MCV RBC AUTO: 101.4 FL
MONOCYTES # BLD: 0.18 X10(3) UL (ref 0.1–1)
MONOCYTES NFR BLD: 3 %
MORPHOLOGY: NORMAL
NEUTROPHILS # BLD AUTO: 4.46 X10 (3) UL (ref 1.5–7.7)
NEUTROPHILS NFR BLD: 94 %
NEUTS HYPERSEG # BLD: 5.55 X10(3) UL (ref 1.5–7.7)
NRBC BLD MANUAL-RTO: 1 %
PLATELET # BLD AUTO: 403 10(3)UL (ref 150–450)
PLATELET MORPHOLOGY: NORMAL
POTASSIUM SERPL-SCNC: 3.6 MMOL/L (ref 3.5–5.1)
PROTHROMBIN TIME: 16.2 SECONDS (ref 11.6–14.8)
Q-T INTERVAL: 326 MS
Q-T INTERVAL: 340 MS
QRS DURATION: 100 MS
QRS DURATION: 100 MS
QTC CALCULATION (BEZET): 429 MS
QTC CALCULATION (BEZET): 456 MS
R AXIS: 86 DEGREES
R AXIS: 92 DEGREES
RBC # BLD AUTO: 2.19 X10(6)UL
SODIUM SERPL-SCNC: 138 MMOL/L (ref 136–145)
T AXIS: -35 DEGREES
T AXIS: 203 DEGREES
TOTAL CELLS COUNTED BLD: 100
VENTRICULAR RATE: 118 BPM
VENTRICULAR RATE: 96 BPM
WBC # BLD AUTO: 5.9 X10(3) UL (ref 4–11)

## 2024-09-03 PROCEDURE — 99233 SBSQ HOSP IP/OBS HIGH 50: CPT | Performed by: INTERNAL MEDICINE

## 2024-09-03 PROCEDURE — 99232 SBSQ HOSP IP/OBS MODERATE 35: CPT | Performed by: INTERNAL MEDICINE

## 2024-09-03 RX ORDER — FUROSEMIDE 10 MG/ML
20 INJECTION INTRAMUSCULAR; INTRAVENOUS ONCE
Status: COMPLETED | OUTPATIENT
Start: 2024-09-03 | End: 2024-09-03

## 2024-09-03 RX ORDER — MAGNESIUM OXIDE 400 MG/1
400 TABLET ORAL ONCE
Status: COMPLETED | OUTPATIENT
Start: 2024-09-03 | End: 2024-09-03

## 2024-09-03 NOTE — PROGRESS NOTES
[facility] Pulmonary Progress Note    Yesenia Daniels Patient Status:  Inpatient    1975 MRN [id number]   Formerly [facility] 3NE-A Attending Monica Loyola MD   Hosp Day # 3 PCP Eduardo Brooks MD     Subjective:  Yesenia Daniels is a(n) 49 year old female who is admitted for shortness of breath respiratory failure.    Overnight: No acute events overnight, pain controlled    Objective:  /71   Pulse 85   Temp 97 °F (36.1 °C) (Axillary)   Resp 18   Ht 5' 8\" (1.727 m)   Wt (!) 315 lb (142.9 kg)   SpO2 95%   BMI 47.90 kg/m²       Temp (24hrs), Av.9 °F (37.2 °C), Min:97 °F (36.1 °C), Max:100 °F (37.8 °C)      Intake/Output:  No intake or output data in the 24 hours ending 24 1220      Physical Exam:   General: alert, cooperative, oriented.  No respiratory distress.   Head: Normocephalic, without obvious abnormality, atraumatic.   Throat: Lips, mucosa, and tongue normal.  No thrush noted.   Neck: trachea midline, no adenopathy, no thyromegaly. No JVD.   Lungs: clear to auscultation bilaterally   Chest wall: No tenderness or deformity.   Heart: regular rate and rhythm, S1, S2 normal, no murmur, click, rub or gallop   Abdomen: soft, non-distended, no masses, no guarding, no     Rebound.   Extremity: No edema or cyanosis   Skin: No rashes or lesions.   Neurological: Alert, interactive, no focal deficits    Lab Data Review:  Recent Labs     24  0352 24  0506 24  0528   WBC 4.3 4.8 4.8   HGB 8.3* 8.1* 7.3*   .0 436.0 410.0     Recent Labs     24  0352 24  1610 24  0506 24  0528     --  141 139   K 3.6 3.8 3.9  3.9 3.5     --  107 107   CO2 24.0  --  24.0 26.0   BUN 14  --  11 7*   CREATSERUM 0.56  --  0.60 0.50*     No results for input(s): \"PTP\", \"INR\", \"PTT\" in the last 168 hours.    Cultures:   Hospital Encounter on 24   1. Blood Culture     Status: None (Preliminary result)    Collection Time: 24  4:12 AM     Specimen: Blood,peripheral   Result Value Ref Range    Blood Culture Result No Growth 3 Days N/A       Radiology:  CTA CHEST (CPT=71275)  Narrative: PROCEDURE:  CT ANGIOGRAPHY, CHEST (CPT=71275)     COMPARISON:  [facility] , XR, XR CHEST AP PORTABLE  (CPT=71045), 8/31/2024, 3:53 AM.  [facility] , CT, CT CHEST (CPT=71250), 2/17/2023, 6:32 PM.     INDICATIONS:  cancer patient, chest palpitations, and shortness of breath     TECHNIQUE:  IV contrast-enhanced multislice CT angiography is performed through the pulmonary arterial anatomy. 3D volume renderings are generated.  Dose reduction techniques were used. Dose information is transmitted to the ACR (American College of   Radiology) NRDR (National Radiology Data Registry) which includes the Dose Index Registry.     PATIENT STATED HISTORY:(As transcribed by Technologist)  Heart palpitations, dyspnea. History of breast Ca.      CONTRAST USED:  100cc of Isovue 370     FINDINGS:    VASCULATURE:  No pulmonary embolus.  THORACIC AORTA:  No aneurysm or visible dissection.    LUNGS:  There is a soft tissue mass in the right hilum extending into the right lower lobe and right middle lobe with collapse of the right middle lobe.  Multiple masses are noted in the right lower lobe the largest measures 3 cm.  This mass is difficult   to measure due to the extension into the hilum.  There is volume loss on the right.  There are nodular interlobular septa asymmetric right lung versus left consistent with lymphangitic spread of metastases.  There is peribronchial thickening.  There is   some ground-glass opacity in the left upper lobe consistent with pneumonitis.  There are some ground-glass opacities noted in the lower lobes adjacent to the lung masses in the RLL.  Marked narrowing of the bronchi to the RML and RLL centrally related to   the mass.  MEDIASTINUM:  Extensive mediastinal lymphadenopathy within the right left paratracheal spaces, prevascular space, subcarinal lymph node.  MELIA:   Bilateral hilar lymphadenopathy the largest lymph nodes are on the right measuring 2.5 x 2 cm.  CARDIAC:  No enlargement, pericardial thickening, or significant coronary artery calcification.  PLEURA:  Small right pleural effusion.  CHEST WALL:  Right-sided Port-A-Cath tip in SVC.  Marked nodular skin thickening involving the entire left chest wall and breast marked thickening up to 1.5 cm.  Multiple cysts nodules are noted within left breast extending to the mediastinum.  Left   axillary soft tissue mass measures up to 3.2 cm.  There are supraclavicular lymph nodes and lymph nodes noted measuring up to 2.5 cm.  2.5 cm right axillary enlarged lymph node also noted.  There are numerous smaller axillary lymph nodes seen on the   left.  LIMITED ABDOMEN:  There is a 3.8 x 3.5 cm mass in the gastrosplenic ligament.  BONES:  Mild to moderate multiple level degenerative disc disease.  OTHER:  Negative.                     Impression: CONCLUSION:    1. No pulmonary embolus or aortic dissection.  2. Extensive tumor extending to involve the left breast and less skin.  There is extensive lymphadenopathy within the hilar regions, supraclavicular region, mediastinum, hilum.  There is masslike lesion in the central aspect of the right lung extending   into the right lower lobe with complete atelectasis of the right middle lobe underlying tumor not excluded in the right middle lobe consolidation/collapse.  There is a soft tissue mass noted in the gastrohepatic ligament noted.  Findings are consistent   with metastatic disease given the history.           Preliminary interpretation was performed by [facility]. Final report agrees with preliminary interpretation without discrepancy.                LOCATION:  [place] [address]        Dictated by (CST): Manuelito Reid MD on 8/31/2024 at 12:20 PM       Finalized by (CST): Manuelito Reid MD on 8/31/2024 at 12:27 PM     XR CHEST AP PORTABLE  (CPT=71045)  Narrative: PROCEDURE:  XR  CHEST AP PORTABLE  (CPT=71045)     TECHNIQUE:  AP chest radiograph was obtained.     COMPARISON:  None.     INDICATIONS:  cancer patient, chest palpitations, and shortness of breath     PATIENT STATED HISTORY: (As transcribed by Technologist)   cancer patient, chest palpitations, and shortness of breath.         FINDINGS:  Moderate to large region of consolidation in the right lung base medially.  This may be related to acute infection versus underlying pulmonary in mass.  There is ground-glass opacity left mid lung.     Port-A-Cath tip SVC.  No pneumothorax.     Mild blunting both costophrenic angles.     Heart size upper limits of normal.                   Impression: CONCLUSION:  Masslike consolidation in the right lung base medially.     Bilateral pulmonary infiltrates/atelectasis.        LOCATION:  [facility]                 Dictated by (CST): Sasha Caballero MD on 8/31/2024 at 7:21 AM       Finalized by (CST): Sasha Caballero MD on 8/31/2024 at 7:22 AM         Medications reviewed     Assessment and Plan:   Patient Active Problem List   Diagnosis    Atrial fibrillation with rapid ventricular response (HCC)    Epigastric abdominal pain    Hyponatremia    Leukocytosis    Chest pressure    Acute deep vein thrombosis (DVT) of axillary vein of right upper extremity (HCC)    Acute deep vein thrombosis (DVT) of brachial vein of right upper extremity (HCC)    Open wound of left breast    Metastasis from breast cancer (HCC)    Sloughing of wound    Allergic reaction    Localized edema    Atrial fibrillation with RVR (HCC)    Community acquired pneumonia, unspecified laterality    Anemia, unspecified type    History of breast cancer       Assessment:  Shortness of breath, likely multifactorial in setting of progressive breast cancer and right middle lobe consolidation  Right middle lobe consolidation, concern for postobstructive pneumonia versus malignancy  Multiple masses noted in the right lower lobe with the largest being  3 cm with known malignancy  Mediastinal adenopathy  Abdominal mass, 3.8 x 3.5 cm mass in gastrosplenic  History of left breast tumor with infiltrating ductal carcinoma  History of DVT, currently on Xarelto  History of asthma  Obesity    Plan:  Would continue ongoing antibiotics  Bronchoscopy hopefully in the next day or 2  Cardiology following for A-fib  Will hold xarelto for now  Patient's goals are more for comfort oriented care  Bronchoscopy would potentially help with dyspnea which would help with her symptoms  Consider palliative/hospice consult  Will follow        Milad Ratliff MD  [facility]  Office: [phone]

## 2024-09-03 NOTE — ANESTHESIA PREPROCEDURE EVALUATION
PRE-OP EVALUATION    Patient Name: Yesenia Daniels    Admit Diagnosis: History of breast cancer [Z85.3]  Atrial fibrillation with RVR (HCC) [I48.91]  Anemia, unspecified type [D64.9]  Community acquired pneumonia, unspecified laterality [J18.9]    Pre-op Diagnosis: Lung collapse,lymphadenopathy    ENDOBRONCHIAL ULTRASOUND (EBUS) WITH BRONCHIAL BIOPSY    Anesthesia Procedure: ENDOBRONCHIAL ULTRASOUND (EBUS) WITH BRONCHIAL BIOPSY    Surgeons and Role:     * Sixto Lockhart MD - Primary    Pre-op vitals reviewed.  Temp: 97 °F (36.1 °C)  Pulse: 85  Resp: 18  BP: 119/70  SpO2: 92 %  Body mass index is 47.9 kg/m².    Current medications reviewed.  Hospital Medications:      Outpatient Medications:     Medications Prior to Admission   Medication Sig Dispense Refill Last Dose    apixaban 5 MG Oral Tab Take 2 tablets (10 mg total) by mouth one time.       Levalbuterol Tartrate 45 MCG/ACT Inhalation Aerosol Inhale 2 puffs into the lungs every 4 (four) hours as needed for Wheezing.   8/31/2024    fluticasone propionate 110 MCG/ACT Inhalation Aerosol Inhale 2 puffs into the lungs every 4 (four) hours as needed (as needed).   8/31/2024    gabapentin 100 MG Oral Cap Take 1 capsule (100 mg total) by mouth 3 (three) times daily.   Past Week    LORazepam 0.5 MG Oral Tab Take 2 tablets (1 mg total) by mouth 2 (two) times daily as needed.   Past Week    fentaNYL 50 MCG/HR Transdermal Patch 72 Hr Place 1 patch onto the skin every third day.   Past Week    loratadine 10 MG Oral Tab Take 1 tablet (10 mg total) by mouth 2 (two) times daily.   8/30/2024    Betamethasone Dipropionate Aug (DIPROLENE AF) 0.05 % External Cream Apply 1 Application topically 2 (two) times daily. 50 g 1 8/30/2024    sodium hypochlorite 0.125 % External Solution Apply on affected areas daily. 1000 mL 3 8/30/2024    tiZANidine 4 MG Oral Tab Take 1 tablet (4 mg total) by mouth 3 (three) times daily as needed (spasms). 20 tablet 0 Past Month    famotidine 20 MG  Oral Tab Take 1 tablet (20 mg total) by mouth 2 (two) times daily.   8/30/2024    XARELTO 20 MG Oral Tab Take 0.5 tablets (10 mg total) by mouth daily.   8/30/2024    oxyCODONE-acetaminophen 5-325 MG Oral Tab Take 1-2 tablets by mouth every 6 (six) hours as needed for Pain. 20 tablet 0 Past Week    dilTIAZem HCl ER Coated Beads 240 MG Oral Capsule SR 24 Hr Take 1 capsule (240 mg total) by mouth daily. (Patient taking differently: Take 1 capsule (240 mg total) by mouth at bedtime.) 30 capsule 1 8/30/2024    Ketorolac Tromethamine 10 MG Oral Tab Take 1 tablet (10 mg total) by mouth every 6 (six) hours as needed for Pain. 20 tablet 0     rivaroxaban 15 & 20 MG Oral Tablet Therapy Pack Take As Directed based on package instructions: Days 1-21: 15 mg by mouth twice daily Days 22-30: 20 mg by mouth once daily 1 each 0     cetirizine 10 MG Oral Tab Take 1 tablet (10 mg total) by mouth daily.       omeprazole 20 MG Oral Capsule Delayed Release Take 1 capsule (20 mg total) by mouth at bedtime.          Allergies: Antihistamine & nasal deconges [fexofenadine-pseudoephedrine], Benadryl [diphenhydramine], Maalox advanced max st [antacid], Morphine, Sudafed [pseudoephedrine], Betadine [povidone iodine], Erythromycin, Latex, Other, and Vitamin e      Anesthesia Evaluation    Patient summary reviewed.    Anesthetic Complications  (-) history of anesthetic complications         GI/Hepatic/Renal                                 Cardiovascular      ECG reviewed.  Exercise tolerance: poor   Comment: Poor exercise tolerance unchanged in >1 year due to chemotherapy                          (+) dysrhythmias (Afib w/ RVR on diltiazem)                   Endo/Other               (+) anemia (Hgb 7.0 on 9/3/24)      (+) clotting disorder (Hx of DVT/PE)             Pulmonary      (+) asthma (albuterol prn)         (+) shortness of breath            Neuro/Psych    Negative neuro/psych ROS.                          TTE 10/2020  1. Left ventricle:  The cavity size was normal. Wall thickness was normal.      Systolic function was vigorous. The estimated ejection fraction was      65-70%. There was no diagnostic evidence for regional wall motion      abnormalities. The study is not technically sufficient to allow      evaluation of LV diastolic function.   2. Ventricular septum: Thickness was increased, with HALLEY present.   3. Mitral valve: Mitral valve demonstrates non-specific thickening of the      leaflets. There was systolic anterior motion of the chordal structures.      There was trivial regurgitation.   4. Left atrium: The left atrium was mildly dilated.   5. Right ventricle: Systolic function was normal.   6. Pulmonary arteries: Systolic pressure could not be accurately estimated.   7. Pericardium, extracardiac: There was no significant pericardial effusion.             Past Surgical History:   Procedure Laterality Date    Cholecystectomy      Endometrial ablation            x5    Removal gallbladder       Social History     Socioeconomic History    Marital status:    Tobacco Use    Smoking status: Never    Smokeless tobacco: Never   Vaping Use    Vaping status: Never Used   Substance and Sexual Activity    Alcohol use: No     Alcohol/week: 0.0 standard drinks of alcohol    Drug use: No     History   Drug Use No     Available pre-op labs reviewed.  Lab Results   Component Value Date    WBC 5.9 2024    RBC 2.19 (L) 2024    HGB 7.0 (L) 2024    HCT 22.2 (L) 2024    .4 (H) 2024    MCH 32.0 2024    MCHC 31.5 2024    RDW 20.1 2024    .0 2024     Lab Results   Component Value Date     2024    K 3.6 2024     2024    CO2 27.0 2024    BUN 8 (L) 2024    CREATSERUM 0.48 (L) 2024    GLU 89 2024    CA 8.4 (L) 2024            Airway      Mallampati: II  Mouth opening: >3 FB  TM distance: 4 - 6 cm  Neck ROM: full  Cardiovascular      Rhythm: irregular       Dental      Dental appliance(s): veneers       Pulmonary    Pulmonary exam normal.                 Other findings              ASA: 3   Plan: general  NPO status verified and patient meets guidelines.          Plan/risks discussed with: patient and spouse  Use of blood product(s) discussed with: patient    Consented to blood products.          Present on Admission:   Atrial fibrillation with RVR (HCC)   Community acquired pneumonia, unspecified laterality   History of breast cancer   Metastasis from breast cancer (HCC)   Acute deep vein thrombosis (DVT) of axillary vein of right upper extremity (HCC)

## 2024-09-03 NOTE — PROGRESS NOTES
IP brief note     Reviewed patient's charting, imaging and labs and discussed with Dr. Ratliff.     CTA with RML collapse with possible malignant vs benign etiologies. Also noted with mediastinal LAD.   Reviewed findings and differential with the patient and  at the bedside. We discussed next steps including diagnostic bronchoscopy, EBUS/TBNA and possible therapeutic bronchoscopic interventions such as mechanical and thermal debridement. We discussed benefits and risks including but not limited to infection, bleeding and respiratory failure. She is agreeable to proceed.    Procedure scheduled for 9/4 morning    Sixto Lockhart MD

## 2024-09-03 NOTE — CM/SW NOTE
Care Progression Note:  Length of stay: 3  GMLOS:   Avoidable Delays:   Code Status: FULL    Acute Medical Issue/Factors:   Atrial fibrillation with RVR (HCC)    Pneumonia, SOB  Breast CA with Mets      Discharge Barriers: Ground-glass opacity left mid lung. Bronch planned in the next day or two. Xarelto held. Palliative Care consult.     Expected discharge date: Pending clinical course.      Expected next site of care: Up with SBA.  From home with spouse.     / available for discharge planning.

## 2024-09-03 NOTE — PROGRESS NOTES
Progress Note  Yesenia Daniels Patient Status:  Inpatient    1975 MRN [id number]   [facility] 3NE-A Attending Valentina Green, DO   Hosp Day # 3 PCP Eduardo Brooks MD     Subjective:  No acute events overnight.  Resting in bed this morning, on O2 at 2L, reports feeling congested and still with exertional dyspnea, denies any CP, palpitations or dizziness at this time.  A-fib with improved HR 80-90s weaning off diltiazem gtt.     Objective:  /71   Pulse 85   Temp 97 °F (36.1 °C) (Axillary)   Resp 18   Ht 5' 8\" (1.727 m)   Wt (!) 315 lb (142.9 kg)   SpO2 95%   BMI 47.90 kg/m²     Telemetry: A-fib, HR 90s      Intake/Output:  No intake or output data in the 24 hours ending 24 1037    Last 3 Weights   24 0823 (!) 315 lb (142.9 kg)   24 0332 (!) 315 lb (142.9 kg)   24 1006 285 lb (129.3 kg)   23 1657 286 lb (129.7 kg)       Labs:  Recent Labs   Lab 24  0506 24  0528 24  0529   GLU 85 91 89   BUN 11 7* 8*   CREATSERUM 0.60 0.50* 0.48*   EGFRCR 110 115 116   CA 8.8 8.5* 8.4*    139 138   K 3.9  3.9 3.5 3.6    107 106   CO2 24.0 26.0 27.0     Recent Labs   Lab 24  0352 24  0506 24  0528   RBC 2.65* 2.55* 2.30*   HGB 8.3* 8.1* 7.3*   HCT 26.2* 26.2* 23.3*   MCV 98.9 102.7* 101.3*   MCH 31.3 31.8 31.7   MCHC 31.7 30.9* 31.3   RDW 21.3 21.0 20.5   NEPRELIM 2.88 3.38  --    WBC 4.3 4.8 4.8   .0 436.0 410.0         Recent Labs   Lab 24  0352 24  0529   TROPHS 12 12       Review of Systems   Constitutional: Positive for malaise/fatigue.   Cardiovascular:  Positive for dyspnea on exertion and irregular heartbeat.   Respiratory:  Positive for shortness of breath.        Physical Exam:    Gen: alert, oriented x 3, NAD  Heent: pupils equal, reactive. Mucous membranes moist.   Neck: no jvd  Cardiac: irregular rate and rhythm, normal S1,S2, n o murmur, gallop or rub   Lungs: coarse, on O2    Abd: soft,  NT/ND, obese +bs  Ext: lower extremities edema edema, RUE edema   Skin: Warm, dry, pale   Neuro: No focal deficits        Medications:     potassium chloride  40 mEq Intravenous Once    dilTIAZem  60 mg Oral 4 times per day    metoprolol tartrate  25 mg Oral 2x Daily(Beta Blocker)    [Held by provider] rivaroxaban  15 mg Oral Daily with dinner    Levalbuterol Tartrate  2 puff Inhalation q6h    digoxin  250 mcg Intravenous Once    famotidine  20 mg Oral BID    cefTRIAXone  2 g Intravenous Daily    fluticasone furoate  1 puff Inhalation Daily    potassium chloride  40 mEq Oral Once    fentaNYL  1 patch Transdermal Q72H    loratadine  10 mg Oral BID      dilTIAZem 10 mg/hr (09/03/24 0954)       Assessment:  Paroxysmal atrial fibrillation, RVR  Rates controlled 80-90s, still on diltiazem gtt  On lopressor 25 mg bid   S/P x 2 doses of digoxin 9/1/24   Hx DCCV 2020  [facility] Echo 10/2023 LVEF 60%, mod increased LA size  [id number] 2 (female, HTN): on Xarelto 20mg daily-patient refusing to take with increased bleeding from the wound side   Suspected PNA  On abx  Pulm following, plans for bronch   Asthma  Albuterol prn   Stage IV Metastatic Breast Cancer w/cutaneous involvement to chest  Chronic Anemia - Hgb 8.1  Hx RUE DVT - historically on xarelto    Plan:  A-fib, rate control improved, wean off diltiazem gtt-continue oral diltiazem, bb.  Patient refuses to take xarelto at increased dose d/t prior hx of sev bleeding, she agrees to take xarelto 10 mg as she was taking prior to admission-will resume. Patient educated about the limited benefits of taking at reduced dose and still risk for bleeding-patient insists to continue reduced dose at 10 mg.   Still appears volume overloaded, will order additional dose of lasix today.   Monitor I/Os and renal function closely.     Plan of care discussed with patient, RN.    Sumaya Du, APRN  9/3/2024  10:37 AM  [phone]

## 2024-09-03 NOTE — PLAN OF CARE
End of shift note:   Pt is A&Ox4, denied any pain overnight. On tele, afib still on cardizem gtt. Pt desatted into the high 80's. On 02 protocol-placed pt on 2l/nc w/ 02 sat in mid 90's. Reporting a decrease in appetite. Up to bathroom w/ sba. Replaced K per protocol. On IV abx. Accessed port to right chest. Pt updated on poc, all questions answered.   Pt refused to be weighed this am-stated she wanted to sleep still, will endorse to oncoming rn.   Problem: CARDIOVASCULAR - ADULT  Goal: Maintains optimal cardiac output and hemodynamic stability  Description: INTERVENTIONS:  - Monitor vital signs, rhythm, and trends  - Monitor for bleeding, hypotension and signs of decreased cardiac output  - Evaluate effectiveness of vasoactive medications to optimize hemodynamic stability  - Monitor arterial and/or venous puncture sites for bleeding and/or hematoma  - Assess quality of pulses, skin color and temperature  - Assess for signs of decreased coronary artery perfusion - ex. Angina  - Evaluate fluid balance, assess for edema, trend weights  Outcome: Progressing     Problem: RESPIRATORY - ADULT  Goal: Achieves optimal ventilation and oxygenation  Description: INTERVENTIONS:  - Assess for changes in respiratory status  - Assess for changes in mentation and behavior  - Position to facilitate oxygenation and minimize respiratory effort  - Oxygen supplementation based on oxygen saturation or ABGs  - Provide Smoking Cessation handout, if applicable  - Encourage broncho-pulmonary hygiene including cough, deep breathe, Incentive Spirometry  - Assess the need for suctioning and perform as needed  - Assess and instruct to report SOB or any respiratory difficulty  - Respiratory Therapy support as indicated  - Manage/alleviate anxiety  - Monitor for signs/symptoms of CO2 retention  Outcome: Progressing

## 2024-09-03 NOTE — PLAN OF CARE
Assumed care at 0730  A/Ox4. 2L NC. Afib on tele   C/o back pain- Norco given per MAR  R chest port a cath  Cardizem drip infusing at 10ml/hr   Cardiac EP- Potassium and Mg replaced  Regular diet- not much of an appetite  IV Rocephin q24   Safety precautions in place.  at bedside. All needs met at this time

## 2024-09-03 NOTE — PROGRESS NOTES
[facility]   part of [facility]     Hospitalist Progress Note     Yesenia Daniels Patient Status:  Inpatient    1975 MRN [id number]   Location [facility] 3NE-A Attending Monica Loyola MD   Hosp Day # 3 PCP Eduardo Brooks MD     Chief Complaint: Afib     Subjective:     Hypoxic overnight, placed on oxygen, wants to go home but feels about the same     Objective:    Review of Systems:   A comprehensive review of systems was completed; pertinent positive and negatives stated in subjective.    Vital signs:  Temp:  [98.3 °F (36.8 °C)-100 °F (37.8 °C)] 99.1 °F (37.3 °C)  Pulse:  [70-90] 82  Resp:  [15-20] 17  BP: (106-134)/(56-74) 115/56  SpO2:  [90 %-94 %] 94 %    Physical Exam:    General: No acute distress  Respiratory: No wheezes, no rhonchi- ausc anteriorly   Cardiovascular: S1, S2, irregular rate and rhythm  Abdomen: Soft, Non-tender, non-distended, positive bowel sounds  Extremities: No edema      Diagnostic Data:    Labs:  Recent Labs   Lab 24  0352 24  0506 24  0528   WBC 4.3 4.8 4.8   HGB 8.3* 8.1* 7.3*   MCV 98.9 102.7* 101.3*   .0 436.0 410.0       Recent Labs   Lab 24  0352 24  1610 24  0506 24  0528 24  0529   GLU 92  --  85 91 89   BUN 14  --  11 7* 8*   CREATSERUM 0.56  --  0.60 0.50* 0.48*   CA 9.2  --  8.8 8.5* 8.4*   ALB 3.6  --   --   --   --      --  141 139 138   K 3.6   < > 3.9  3.9 3.5 3.6     --  107 107 106   CO2 24.0  --  24.0 26.0 27.0   ALKPHO 64  --   --   --   --    AST 19  --   --   --   --    ALT 23  --   --   --   --    BILT 0.7  --   --   --   --    TP 5.9  --   --   --   --     < > = values in this interval not displayed.       Estimated Creatinine Clearance: 143 mL/min (A) (based on SCr of 0.48 mg/dL (L)).    Recent Labs   Lab 24  0352 24  0529   TROPHS 12 12       No results for input(s): \"PTP\", \"INR\" in the last 168 hours.               Microbiology    Hospital Encounter on  08/31/24   1. Blood Culture     Status: None (Preliminary result)    Collection Time: 08/31/24  4:12 AM    Specimen: Blood,peripheral   Result Value Ref Range    Blood Culture Result No Growth 2 Days N/A         Imaging: Reviewed in Epic.    Medications:    dilTIAZem  60 mg Oral 4 times per day    metoprolol tartrate  25 mg Oral 2x Daily(Beta Blocker)    rivaroxaban  15 mg Oral Daily with dinner    Levalbuterol Tartrate  2 puff Inhalation q6h    digoxin  250 mcg Intravenous Once    famotidine  20 mg Oral BID    cefTRIAXone  2 g Intravenous Daily    fluticasone furoate  1 puff Inhalation Daily    potassium chloride  40 mEq Oral Once    fentaNYL  1 patch Transdermal Q72H    loratadine  10 mg Oral BID       Assessment & Plan:      #Afib, RVR  -Cardizem  -Xarelto - pt refusing    #RML postobstructive pneumonia   -Pulm on CS, bronchoscopy planned  -iv Abx    #Breast ca with skin, lung, CNs mets  -Pain control - continue fentanyl, tolerating Norco for now    #Chronic Anemia due to chemo, malignancy   #h/o RUE DVT      Supplementary Documentation:     Quality:  DVT Mechanical Prophylaxis:        DVT Pharmacologic Prophylaxis   Medication    rivaroxaban (Xarelto) tab 15 mg    heparin (Porcine) 100 Units/mL lock flush 500 Units                Code Status: Not on file  Crawford: No urinary catheter in place  Crawford Duration (in days):   Central line:    JACQUIE: 8/31/2024    Discharge is dependent on: clinical   At this point Ms. Daniels is expected to be discharge to: home    The 21st Century Cures Act makes medical notes like these available to patients in the interest of transparency. Please be advised this is a medical document. Medical documents are intended to carry relevant information, facts as evident, and the clinical opinion of the practitioner. The medical note is intended as peer to peer communication and may appear blunt or direct. It is written in medical language and may contain abbreviations or verbiage that are  unfamiliar.

## 2024-09-04 ENCOUNTER — APPOINTMENT (OUTPATIENT)
Dept: INTERVENTIONAL RADIOLOGY/VASCULAR | Facility: HOSPITAL | Age: 49
End: 2024-09-04
Attending: INTERNAL MEDICINE
Payer: COMMERCIAL

## 2024-09-04 ENCOUNTER — ANESTHESIA (OUTPATIENT)
Dept: ENDOSCOPY | Facility: HOSPITAL | Age: 49
End: 2024-09-04
Payer: COMMERCIAL

## 2024-09-04 ENCOUNTER — ANESTHESIA EVENT (OUTPATIENT)
Dept: INTERVENTIONAL RADIOLOGY/VASCULAR | Facility: HOSPITAL | Age: 49
End: 2024-09-04
Payer: COMMERCIAL

## 2024-09-04 PROBLEM — J98.8 ANATOMIC AIRWAY OBSTRUCTION: Status: ACTIVE | Noted: 2024-09-04

## 2024-09-04 PROBLEM — Z71.89 GOALS OF CARE, COUNSELING/DISCUSSION: Status: ACTIVE | Noted: 2024-09-04

## 2024-09-04 PROBLEM — R06.02 SHORTNESS OF BREATH: Status: ACTIVE | Noted: 2024-09-04

## 2024-09-04 PROBLEM — Z51.5 PALLIATIVE CARE BY SPECIALIST: Status: ACTIVE | Noted: 2024-09-04

## 2024-09-04 PROBLEM — R59.0 THORACIC LYMPHADENOPATHY: Status: ACTIVE | Noted: 2024-09-04

## 2024-09-04 PROBLEM — G89.3 CANCER RELATED PAIN: Status: ACTIVE | Noted: 2024-09-04

## 2024-09-04 LAB
ANION GAP SERPL CALC-SCNC: 7 MMOL/L (ref 0–18)
BUN BLD-MCNC: 8 MG/DL (ref 9–23)
CALCIUM BLD-MCNC: 8.3 MG/DL (ref 8.7–10.4)
CHLORIDE SERPL-SCNC: 105 MMOL/L (ref 98–112)
CO2 SERPL-SCNC: 24 MMOL/L (ref 21–32)
CREAT BLD-MCNC: 0.46 MG/DL
EGFRCR SERPLBLD CKD-EPI 2021: 117 ML/MIN/1.73M2 (ref 60–?)
ERYTHROCYTE [DISTWIDTH] IN BLOOD BY AUTOMATED COUNT: 19.9 %
GLUCOSE BLD-MCNC: 97 MG/DL (ref 70–99)
HCT VFR BLD AUTO: 22.6 %
HGB BLD-MCNC: 7.2 G/DL
MAGNESIUM SERPL-MCNC: 1.5 MG/DL (ref 1.6–2.6)
MCH RBC QN AUTO: 31.9 PG (ref 26–34)
MCHC RBC AUTO-ENTMCNC: 31.9 G/DL (ref 31–37)
MCV RBC AUTO: 100 FL
OSMOLALITY SERPL CALC.SUM OF ELEC: 280 MOSM/KG (ref 275–295)
PLATELET # BLD AUTO: 397 10(3)UL (ref 150–450)
POTASSIUM SERPL-SCNC: 3.4 MMOL/L (ref 3.5–5.1)
POTASSIUM SERPL-SCNC: 3.4 MMOL/L (ref 3.5–5.1)
RBC # BLD AUTO: 2.26 X10(6)UL
SODIUM SERPL-SCNC: 136 MMOL/L (ref 136–145)
WBC # BLD AUTO: 5.6 X10(3) UL (ref 4–11)

## 2024-09-04 PROCEDURE — 07D78ZX EXTRACTION OF THORAX LYMPHATIC, VIA NATURAL OR ARTIFICIAL OPENING ENDOSCOPIC, DIAGNOSTIC: ICD-10-PCS | Performed by: INTERNAL MEDICINE

## 2024-09-04 PROCEDURE — 31640 BRONCHOSCOPY W/TUMOR EXCISE: CPT | Performed by: INTERNAL MEDICINE

## 2024-09-04 PROCEDURE — 31652 BRONCH EBUS SAMPLNG 1/2 NODE: CPT | Performed by: INTERNAL MEDICINE

## 2024-09-04 PROCEDURE — 99223 1ST HOSP IP/OBS HIGH 75: CPT | Performed by: STUDENT IN AN ORGANIZED HEALTH CARE EDUCATION/TRAINING PROGRAM

## 2024-09-04 PROCEDURE — 99497 ADVNCD CARE PLAN 30 MIN: CPT | Performed by: STUDENT IN AN ORGANIZED HEALTH CARE EDUCATION/TRAINING PROGRAM

## 2024-09-04 PROCEDURE — 99232 SBSQ HOSP IP/OBS MODERATE 35: CPT | Performed by: INTERNAL MEDICINE

## 2024-09-04 PROCEDURE — 31625 BRONCHOSCOPY W/BIOPSY(S): CPT | Performed by: INTERNAL MEDICINE

## 2024-09-04 PROCEDURE — 5A2204Z RESTORATION OF CARDIAC RHYTHM, SINGLE: ICD-10-PCS | Performed by: PODIATRIST

## 2024-09-04 PROCEDURE — 0BB58ZZ EXCISION OF RIGHT MIDDLE LOBE BRONCHUS, VIA NATURAL OR ARTIFICIAL OPENING ENDOSCOPIC: ICD-10-PCS | Performed by: INTERNAL MEDICINE

## 2024-09-04 RX ORDER — ALBUTEROL SULFATE 0.83 MG/ML
SOLUTION RESPIRATORY (INHALATION)
Status: COMPLETED
Start: 2024-09-04 | End: 2024-09-04

## 2024-09-04 RX ORDER — FUROSEMIDE 10 MG/ML
20 INJECTION INTRAMUSCULAR; INTRAVENOUS ONCE
Status: COMPLETED | OUTPATIENT
Start: 2024-09-04 | End: 2024-09-04

## 2024-09-04 RX ORDER — NALOXONE HYDROCHLORIDE 0.4 MG/ML
0.08 INJECTION, SOLUTION INTRAMUSCULAR; INTRAVENOUS; SUBCUTANEOUS AS NEEDED
Status: DISCONTINUED | OUTPATIENT
Start: 2024-09-04 | End: 2024-09-04 | Stop reason: HOSPADM

## 2024-09-04 RX ORDER — SODIUM CHLORIDE 9 MG/ML
INJECTION, SOLUTION INTRAVENOUS CONTINUOUS
Status: DISCONTINUED | OUTPATIENT
Start: 2024-09-04 | End: 2024-09-05

## 2024-09-04 RX ORDER — HYDROMORPHONE HYDROCHLORIDE 1 MG/ML
0.2 INJECTION, SOLUTION INTRAMUSCULAR; INTRAVENOUS; SUBCUTANEOUS EVERY 5 MIN PRN
Status: DISCONTINUED | OUTPATIENT
Start: 2024-09-04 | End: 2024-09-04 | Stop reason: HOSPADM

## 2024-09-04 RX ORDER — LIDOCAINE HYDROCHLORIDE 10 MG/ML
INJECTION, SOLUTION EPIDURAL; INFILTRATION; INTRACAUDAL; PERINEURAL AS NEEDED
Status: DISCONTINUED | OUTPATIENT
Start: 2024-09-04 | End: 2024-09-04 | Stop reason: SURG

## 2024-09-04 RX ORDER — SODIUM CHLORIDE, SODIUM LACTATE, POTASSIUM CHLORIDE, CALCIUM CHLORIDE 600; 310; 30; 20 MG/100ML; MG/100ML; MG/100ML; MG/100ML
INJECTION, SOLUTION INTRAVENOUS CONTINUOUS
Status: DISCONTINUED | OUTPATIENT
Start: 2024-09-04 | End: 2024-09-04

## 2024-09-04 RX ORDER — DEXAMETHASONE SODIUM PHOSPHATE 4 MG/ML
VIAL (ML) INJECTION AS NEEDED
Status: DISCONTINUED | OUTPATIENT
Start: 2024-09-04 | End: 2024-09-04 | Stop reason: SURG

## 2024-09-04 RX ORDER — HYDROMORPHONE HYDROCHLORIDE 1 MG/ML
0.4 INJECTION, SOLUTION INTRAMUSCULAR; INTRAVENOUS; SUBCUTANEOUS EVERY 5 MIN PRN
Status: DISCONTINUED | OUTPATIENT
Start: 2024-09-04 | End: 2024-09-04 | Stop reason: HOSPADM

## 2024-09-04 RX ORDER — ROCURONIUM BROMIDE 10 MG/ML
INJECTION, SOLUTION INTRAVENOUS AS NEEDED
Status: DISCONTINUED | OUTPATIENT
Start: 2024-09-04 | End: 2024-09-04 | Stop reason: SURG

## 2024-09-04 RX ORDER — METOPROLOL TARTRATE 25 MG/1
25 TABLET, FILM COATED ORAL
Status: DISCONTINUED | OUTPATIENT
Start: 2024-09-04 | End: 2024-09-04

## 2024-09-04 RX ORDER — METOPROLOL TARTRATE 1 MG/ML
5 INJECTION, SOLUTION INTRAVENOUS EVERY 6 HOURS PRN
Status: DISCONTINUED | OUTPATIENT
Start: 2024-09-04 | End: 2024-09-05

## 2024-09-04 RX ORDER — SENNA AND DOCUSATE SODIUM 50; 8.6 MG/1; MG/1
2 TABLET, FILM COATED ORAL DAILY
Status: DISCONTINUED | OUTPATIENT
Start: 2024-09-04 | End: 2024-09-05

## 2024-09-04 RX ORDER — ONDANSETRON 4 MG/1
4 TABLET, FILM COATED ORAL EVERY 6 HOURS PRN
Status: DISCONTINUED | OUTPATIENT
Start: 2024-09-04 | End: 2024-09-05

## 2024-09-04 RX ORDER — ALBUTEROL SULFATE 0.83 MG/ML
2.5 SOLUTION RESPIRATORY (INHALATION) AS NEEDED
Status: DISCONTINUED | OUTPATIENT
Start: 2024-09-04 | End: 2024-09-04 | Stop reason: HOSPADM

## 2024-09-04 RX ORDER — ONDANSETRON 2 MG/ML
INJECTION INTRAMUSCULAR; INTRAVENOUS AS NEEDED
Status: DISCONTINUED | OUTPATIENT
Start: 2024-09-04 | End: 2024-09-04 | Stop reason: SURG

## 2024-09-04 RX ORDER — LIDOCAINE HYDROCHLORIDE 40 MG/ML
SOLUTION TOPICAL AS NEEDED
Status: DISCONTINUED | OUTPATIENT
Start: 2024-09-04 | End: 2024-09-04 | Stop reason: SURG

## 2024-09-04 RX ORDER — MAGNESIUM OXIDE 400 MG/1
800 TABLET ORAL ONCE
Status: COMPLETED | OUTPATIENT
Start: 2024-09-04 | End: 2024-09-04

## 2024-09-04 RX ORDER — HYDROMORPHONE HYDROCHLORIDE 1 MG/ML
0.6 INJECTION, SOLUTION INTRAMUSCULAR; INTRAVENOUS; SUBCUTANEOUS EVERY 5 MIN PRN
Status: DISCONTINUED | OUTPATIENT
Start: 2024-09-04 | End: 2024-09-04 | Stop reason: HOSPADM

## 2024-09-04 RX ADMIN — LIDOCAINE HYDROCHLORIDE 2 ML: 40 SOLUTION TOPICAL at 07:20:00

## 2024-09-04 RX ADMIN — ONDANSETRON 4 MG: 2 INJECTION INTRAMUSCULAR; INTRAVENOUS at 07:52:00

## 2024-09-04 RX ADMIN — ROCURONIUM BROMIDE 50 MG: 10 INJECTION, SOLUTION INTRAVENOUS at 07:18:00

## 2024-09-04 RX ADMIN — LIDOCAINE HYDROCHLORIDE 50 MG: 10 INJECTION, SOLUTION EPIDURAL; INFILTRATION; INTRACAUDAL; PERINEURAL at 07:17:00

## 2024-09-04 RX ADMIN — LIDOCAINE HYDROCHLORIDE 50 MG: 10 INJECTION, SOLUTION EPIDURAL; INFILTRATION; INTRACAUDAL; PERINEURAL at 16:54:00

## 2024-09-04 RX ADMIN — DEXAMETHASONE SODIUM PHOSPHATE 4 MG: 4 MG/ML VIAL (ML) INJECTION at 07:29:00

## 2024-09-04 NOTE — CONSULTS
[facility]   part of [facility]  Palliative Care Initial Consult Note    Yesenia Daniels Patient Status:  Inpatient    1975 MRN [id number]   Location [facility] 3NE-A Attending Valentina Green, DO   Hosp Day # 4 PCP Eduardo Brooks MD     Date of Consult: 2024  Patient seen at: [facility]    Reason for Consultation: Consult ordered by:: Dr. Green for evaluation of Palliative Care needs and Goals of care discussion.    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 Work up in our hospital revealed found to have right middle lobe collapse and thoracic lymphadenopathy.  Bronch this AM: Suspected malignant airway obstruction due to mixed extrinsic and intrinsic compression of the right middle lobe bronchus. History was obtained from [facility] and patient and . Established with [facility]  24.    Today is day #4 of hospitalization.     When I entered the room, the patient was awake, alert, and sitting up in bed. Spouse present at bedside.   Yesenia immediately asked about Hospice care. See summary of discussion below.   She was also having breast pain and nausea.       Review of Systems:   Symptoms(s): Anorexia;Cough;Dyspnea;Nausea;Fatigue;Pain  Bowel Movement    No data found in the last 1 encounters.       Wt Readings from Last 6 Encounters:   24 (!) 315 lb (142.9 kg)   24 285 lb (129.3 kg)   23 286 lb (129.7 kg)   23 295 lb (133.8 kg)   23 281 lb 3.2 oz (127.6 kg)   21 300 lb (136.1 kg)        Palliative Care Social History:   Marital Status:   Children: Yes  Living Situation Prior to Admit: Home with family   Is Patient Confused: No    Substance History:   reports that she has never  smoked. She has never used smokeless tobacco.  reports no history of alcohol use.  reports no history of drug use.    Spiritual Assessment:   Anabaptism - Sabianist Anabaptism    Past Medical History/Past Surgical History:     Medical History: obtained from [facility]  Past Medical History:    Arrhythmia    Asthma (HCC)    Back problem    Breast cancer (HCC)    Cancer (HCC)    Shortness of breath    Visual impairment     Past Surgical History:   Procedure Laterality Date    Cholecystectomy      Endometrial ablation            x5    Removal gallbladder         Family History: obtained from [facility]  Family History   Problem Relation Age of Onset    Heart Disorder Father     Cancer Father     Heart Disorder Mother     Heart Disorder Maternal Grandmother     Heart Disorder Maternal Grandfather     Heart Disorder Paternal Grandmother     Heart Disorder Sister        Allergies:  Allergies   Allergen Reactions    Antihistamine & Nasal Deconges [Fexofenadine-Pseudoephedrine] PALPITATIONS    Benadryl [Diphenhydramine] PALPITATIONS and SHORTNESS OF BREATH    Maalox Advanced Max St [Antacid] PALPITATIONS and SHORTNESS OF BREATH    Morphine ANXIETY     Tolerates hydrocodone    Sudafed [Pseudoephedrine] PALPITATIONS and SHORTNESS OF BREATH    Betadine [Povidone Iodine] RASH    Erythromycin NAUSEA AND VOMITING    Latex RASH    Other OTHER (SEE COMMENTS)     Morphine makes her loopy,benedryl as well    Vitamin E RASH       Medications:     Current Facility-Administered Medications:     [START ON 2024] rivaroxaban (Xarelto) tab 10 mg, 10 mg, Oral, Daily with dinner    senna-docusate (Senokot-S) 8.6-50 MG per tab 2 tablet, 2 tablet, Oral, Daily    [START ON 2024] cefTRIAXone (Rocephin) 2 g in sodium chloride 0.9% 100 mL IVPB-ADDV, 2 g, Intravenous, Daily    potassium chloride 40 mEq in 250mL sodium chloride 0.9% IVPB premix, 40 mEq, Intravenous, Once    magnesium oxide (Mag-Ox) tab 800 mg, 800 mg, Oral, Once    dilTIAZem  (cardIZEM) tab 60 mg, 60 mg, Oral, 4 times per day    metoprolol tartrate (Lopressor) tab 25 mg, 25 mg, Oral, 2x Daily(Beta Blocker)    Levalbuterol Tartrate (XOPENEX HFA) inhaler PT SUPPLIED, 2 puff, Inhalation, q6h    digoxin (Lanoxin) 250 MCG/ML injection 250 mcg, 250 mcg, Intravenous, Once    famotidine (Pepcid) tab 20 mg, 20 mg, Oral, BID    tiZANidine (Zanaflex) tab 4 mg, 4 mg, Oral, TID PRN    melatonin tab 3 mg, 3 mg, Oral, Nightly PRN    acetaminophen (Tylenol Extra Strength) tab 500 mg, 500 mg, Oral, Q4H PRN    ondansetron (Zofran) 4 MG/2ML injection 4 mg, 4 mg, Intravenous, Q6H PRN    prochlorperazine (Compazine) 10 MG/2ML injection 5 mg, 5 mg, Intravenous, Q8H PRN    polyethylene glycol (PEG 3350) (Miralax) 17 g oral packet 17 g, 17 g, Oral, Daily PRN    sennosides (Senokot) tab 17.2 mg, 17.2 mg, Oral, Nightly PRN    bisacodyl (Dulcolax) 10 MG rectal suppository 10 mg, 10 mg, Rectal, Daily PRN    fleet enema (Fleet) rectal enema 133 mL, 1 enema, Rectal, Once PRN    heparin (Porcine) 100 Units/mL lock flush 500 Units, 5 mL, Intravenous, PRN    HYDROcodone-acetaminophen (Norco) 5-325 MG per tab 1 tablet, 1 tablet, Oral, Q6H PRN    HYDROcodone-acetaminophen (Norco) 5-325 MG per tab 1 tablet, 1 tablet, Oral, Q4H PRN **OR** HYDROcodone-acetaminophen (Norco) 5-325 MG per tab 2 tablet, 2 tablet, Oral, Q4H PRN    LORazepam (Ativan) tab 0.5 mg, 0.5 mg, Oral, BID PRN    fluticasone furoate (Arnuity Ellipta) 100 MCG/ACT inhaler 1 puff, 1 puff, Inhalation, Daily    HYDROmorphone (Dilaudid) 1 MG/ML injection 0.2 mg, 0.2 mg, Intravenous, Q4H PRN **OR** HYDROmorphone (Dilaudid) 1 MG/ML injection 0.4 mg, 0.4 mg, Intravenous, Q4H PRN **OR** HYDROmorphone (Dilaudid) 1 MG/ML injection 0.8 mg, 0.8 mg, Intravenous, Q4H PRN    potassium chloride (Klor-Con M20) tab 40 mEq, 40 mEq, Oral, Once    fentaNYL (Duragesic) 50 MCG/HR patch 1 patch, 1 patch, Transdermal, Q72H    sodium hypochlorite (Dakin's) 0.125 % external solution, ,  Topical, PRN    loratadine (Claritin Reditabs) oral disintegrating tablet TBDP 10 mg, 10 mg, Oral, BID    Palliative Performance Scale:   60%  % Ambulation Activity Level Self-Care Intake Consciousness   100 Full  Normal  No Disease Full Normal Full   90 Full  Normal  Some Disease Full Normal Full   80 Full  Normal w/effort  Some Disease Full Normal or reduced Full   70 Reduced  Can't Perform Job  Some Disease Full Normal or reduced Full   60 Reduced  Can't Perform Hobby   Significant Disease Occ Assist Normal or reduced Full or confused   50 Mainly sit/lie Can't do any work  Extensive Disease Partial Assist Normal or reduced Full or confused   40 Mainly in bed Can't do any work  Extensive Disease Mainly Assist Normal or reduced Full or confused   30 Bed Bound Can't do any work  Extensive Disease Max Assist  Total Care Reduced  Drowsy/confused   20 Bed Bound Can't do any work  Extensive Disease Max Assist  Total Care Minimal  Drowsy/confused   10 Bed Bound Can't do any work  Extensive Disease Max Assist  Total Care Mouth Care  Drowsy/confused   0 Death        Objective      Vital Signs:  Blood pressure 118/59, pulse 107, temperature 97.4 °F (36.3 °C), temperature source Temporal, resp. rate 20, height 5' 8\" (1.727 m), weight (!) 315 lb (142.9 kg), SpO2 97%, not currently breastfeeding.  Body mass index is 47.9 kg/m².  Present Level of pain: moderate   Non-verbal signs of pain present: No    Physical Exam:  General: Alert & Awake. In mild respiratory distress. Body habitus Obese   HEENT: alopecia.   Lungs:  cough with some conversational dyspnea  on NC  Extremities: compression stockings in place   Neurologic: AOx3  Psychiatric: Mood down but pleasant and appropriate   Skin: pale     Hematology:  Lab Results   Component Value Date    WBC 5.6 09/04/2024    HGB 7.2 (L) 09/04/2024    HCT 22.6 (L) 09/04/2024    .0 09/04/2024       Coags:  Lab Results   Component Value Date    INR 1.30 (H) 09/03/2024    PTT 35.2  01/06/2023       Chemistry:  Lab Results   Component Value Date    CREATSERUM 0.46 (L) 09/04/2024    BUN 8 (L) 09/04/2024     09/04/2024    K 3.4 (L) 09/04/2024    K 3.4 (L) 09/04/2024     09/04/2024    CO2 24.0 09/04/2024    GLU 97 09/04/2024    CA 8.3 (L) 09/04/2024    ALB 3.6 08/31/2024    ALKPHO 64 08/31/2024    BILT 0.7 08/31/2024    TP 5.9 08/31/2024    AST 19 08/31/2024    ALT 23 08/31/2024    DDIMER 0.96 (H) 04/05/2021    MG 1.5 (L) 09/04/2024    TROP <0.045 04/05/2021       Imaging:  No results found.    Summary of Discussion      I discussed reason for palliative care consultation with patient and .    I differentiated the palliative treatment-focus model versus the hospice comfort-focused philosophy of care. I informed the patient/family that having palliative care support does not limit medical treatment options or decisions to those who wish to continue curative or restorative medical therapies. I discussed the benefits of palliative care to include assistance with arising symptom management needs, an extra layer of support, to ensure GOC are respected throughout healthcare continuum, and assist with transition to hospice care when appropriate.      Outpatient/Community Palliative Care Services:  Usually visit once per 4 weeks  Focus on GOC and symptom management   Palliative Care criteria:  Not altered by prognosis   Does not limit curative or restorative therapies      Outpatient Hospice services:  24/7 phone triage services   RN visit one or more times per week depending on need  Home health aide to assist in ADLs/hygiene   Hospice criteria:  Less than six-month prognosis   Must forego most life-prolonging  measures/treatments   Focus solely on comfort   Must sign onto hospice benefit with agency     I provided brief overview of Medicare hospice benefit along with hospice philosophy and answered all questions. At this time, Yesenia Daniels condition does qualify for hospice  services and they consent to hospice consultation . We discussed benefit at length. She would like to have a GIP setting outside of a hospital and prefer a hospice facility. Choice regarding several agencies provided. Based on hospice facility and location, she wishes to pursue [facility].     Prognostic awareness/understanding: Good  Yesenia and Librado have a good understanding of current medical conditions. Yesenia is aware that her disease has progressed and her SOB is likely 2/2 metastatic breast cancer compressing her airway. She does not wish to undergo more chemo despite it being offered. She states that as her disease worsened and treatments changed, she has felt more side effects than benefit. She does not feel chemo is giving her quality time with her family. I asked if her pain was better managed if she would feel better, to which she replied no as it is more than just the pain but SOB as well.   We discussed the disease trajectory of  metastatic breast cancer with associated symptoms and decline over time.     Hopes/goals:   Yesenia wishes to go home and be comfortable with her family. Librado supports her.   Yesenia would like to have pain better managed.   She does not wish to have more testing, chemo, or return to the hospital setting.     Fears/concerns:   Yesenia was asking about max pain relief and sedation. She has no SI but does not want to live in pain or severe dyspnea. We discussed escalating medication options at home and under GIP setting. She would like to continue to interact with family but have the maximum pain relief.     Provided emotional support to Spouse and and pt .    Advance Care Planning counseling and discussion:   HC POA Documentation Reports completion but not in Epic (family requested to bring in).   Yesenia states that her  Librado is her POA.   We voluntarily discussed the risks vs benefits of life sustaining treatments in the setting of comorbid medical conditions with patient  and spouse. She does not want CPR or intubation. Significant time spent on selective vs comfort. She would like selective for now prior to signing hospice consents. Will likely leave with comfort POLST.   POLST FORM Not completed & Form provided. Verbal consent to change code status obtained from pt.   No Order --> DNAR/select     Assessment and Recommendations        Principal Problem:    Atrial fibrillation with RVR (HCC)  Active Problems:    Acute deep vein thrombosis (DVT) of axillary vein of right upper extremity (HCC)    Metastasis from breast cancer (HCC)    Community acquired pneumonia, unspecified laterality    Anemia, unspecified type    History of breast cancer    Anatomic airway obstruction    Thoracic lymphadenopathy   Palliative Care encounter    Goals of care: established   [facility] consulted   Code Status: DNAR/Selective Treatment    Librado is POA. Paperwork at home.     Discussed today's visit with Spouse, RN, SW/CM, and Care Team .    Palliative Care Follow Up: Palliative care team will Continue to follow while inpatient.    Thank you for allowing Palliative Care services to participate in the care of Yesenia HODGES Jose Alfredo Richey.    Consult included all of the following: chart review, direct face to face contact, history taking, physical examination, counseling and coordinating care, and documentation.   High MDM. High problem: metastatic breast cancer with progression causing right middle lobe lung collapse. Data Extensive: outpatient PC note reviewed, discussion with spouse and pt at bedside, and review and discussion of bronchoscopy. High Risk: discussion of DNR and de escalation of care with hospice consultation.   >16 minutes spent on ACP discussing POLST, pt preferences, EOL, and confirming POA.     Lashonda Aaron MD  9/4/2024  12:29 PM  Palliative Care Services    The 21st Century Cures Act makes medical notes like these available to patients in the interest of transparency. Please be  advised this is a medical document. Medical documents are intended to carry relevant information, facts as evident, and the clinical opinion of the practitioner. The medical note is intended as peer to peer communication and may appear blunt or direct. It is written in medical language and may contain abbreviations or verbiage that are unfamiliar.      Addendum: Revisited around 3:45 PM. Pt and spouse signed on with [facility] and plan to discharge home tomorrow. They wish to complete POLST tomorrow. Will follow up in AM. Lashonda Aaron MD, 09/04/24, 3:53 PM

## 2024-09-04 NOTE — ANESTHESIA PREPROCEDURE EVALUATION
PRE-OP EVALUATION    Patient Name: Yesenia Daniels    Admit Diagnosis: History of breast cancer [Z85.3]  Atrial fibrillation with RVR (HCC) [I48.91]  Anemia, unspecified type [D64.9]  Community acquired pneumonia, unspecified laterality [J18.9]    Pre-op Diagnosis: * No pre-op diagnosis entered *        Anesthesia Procedure: CATH CARDIOVERSION    * No surgeons found in log *    Pre-op vitals reviewed.  Temp: 98 °F (36.7 °C)  Pulse: 66  Resp: 20  BP: 138/109  SpO2: 96 %  Body mass index is 47.9 kg/m².    Current medications reviewed.  Hospital Medications:      Outpatient Medications:     Medications Prior to Admission   Medication Sig Dispense Refill Last Dose    apixaban 5 MG Oral Tab Take 2 tablets (10 mg total) by mouth one time.       Levalbuterol Tartrate 45 MCG/ACT Inhalation Aerosol Inhale 2 puffs into the lungs every 4 (four) hours as needed for Wheezing.   8/31/2024    fluticasone propionate 110 MCG/ACT Inhalation Aerosol Inhale 2 puffs into the lungs every 4 (four) hours as needed (as needed).   8/31/2024    gabapentin 100 MG Oral Cap Take 1 capsule (100 mg total) by mouth 3 (three) times daily.   Past Week    LORazepam 0.5 MG Oral Tab Take 2 tablets (1 mg total) by mouth 2 (two) times daily as needed.   Past Week    fentaNYL 50 MCG/HR Transdermal Patch 72 Hr Place 1 patch onto the skin every third day.   Past Week    loratadine 10 MG Oral Tab Take 1 tablet (10 mg total) by mouth 2 (two) times daily.   8/30/2024    Betamethasone Dipropionate Aug (DIPROLENE AF) 0.05 % External Cream Apply 1 Application topically 2 (two) times daily. 50 g 1 8/30/2024    sodium hypochlorite 0.125 % External Solution Apply on affected areas daily. 1000 mL 3 8/30/2024    tiZANidine 4 MG Oral Tab Take 1 tablet (4 mg total) by mouth 3 (three) times daily as needed (spasms). 20 tablet 0 Past Month    famotidine 20 MG Oral Tab Take 1 tablet (20 mg total) by mouth 2 (two) times daily.   8/30/2024    XARELTO 20 MG Oral Tab Take  0.5 tablets (10 mg total) by mouth daily.   8/30/2024    oxyCODONE-acetaminophen 5-325 MG Oral Tab Take 1-2 tablets by mouth every 6 (six) hours as needed for Pain. 20 tablet 0 Past Week    dilTIAZem HCl ER Coated Beads 240 MG Oral Capsule SR 24 Hr Take 1 capsule (240 mg total) by mouth daily. (Patient taking differently: Take 1 capsule (240 mg total) by mouth at bedtime.) 30 capsule 1 8/30/2024    Ketorolac Tromethamine 10 MG Oral Tab Take 1 tablet (10 mg total) by mouth every 6 (six) hours as needed for Pain. 20 tablet 0     rivaroxaban 15 & 20 MG Oral Tablet Therapy Pack Take As Directed based on package instructions: Days 1-21: 15 mg by mouth twice daily Days 22-30: 20 mg by mouth once daily 1 each 0     cetirizine 10 MG Oral Tab Take 1 tablet (10 mg total) by mouth daily.       omeprazole 20 MG Oral Capsule Delayed Release Take 1 capsule (20 mg total) by mouth at bedtime.          Allergies: Antihistamine & nasal deconges [fexofenadine-pseudoephedrine], Benadryl [diphenhydramine], Maalox advanced max st [antacid], Morphine, Sudafed [pseudoephedrine], Betadine [povidone iodine], Erythromycin, Latex, Other, and Vitamin e      Anesthesia Evaluation        Anesthetic Complications           GI/Hepatic/Renal                                 Cardiovascular  Comment: CORONARY ARTERIOGRAPHY:  The coronary circulation was right dominant with minimal vascular calcification seen fluoroscopically.     The left main coronary artery had no angiographic evidence of significant obstructive atherosclerosis.     The left anterior descending coronary artery had minimal intimal thickening in the proximal portion.  There was minimal intimal thickening in the proximal portion of the first major diagonal.     The left circumflex coronary artery had no angiographic evidence of significant obstructive atherosclerosis.     The right coronary artery was a nice dominant vessel.  There was minimal intimal thickening in the proximal and mid  vessel.     SUMMARY:  In summary, the patient is a 58-year-old gentleman who on cardiac catheterization today was demonstrated to have angiographically normal coronary arteries with the exception of minimal intimal thickening.  He has a densely calcified aortic valve.      Exercise tolerance: poor     MET: <=4    (+) obesity                    (+) dysrhythmias and atrial fibrillation                  Endo/Other                                  Pulmonary      (+) asthma         (+) shortness of breath            Neuro/Psych                              Metastatic breast cancer.         Past Surgical History:   Procedure Laterality Date    Cholecystectomy      Endometrial ablation            x5    Removal gallbladder       Social History     Socioeconomic History    Marital status:    Tobacco Use    Smoking status: Never    Smokeless tobacco: Never   Vaping Use    Vaping status: Never Used   Substance and Sexual Activity    Alcohol use: No     Alcohol/week: 0.0 standard drinks of alcohol    Drug use: No     History   Drug Use No     Available pre-op labs reviewed.  Lab Results   Component Value Date    WBC 5.6 2024    RBC 2.26 (L) 2024    HGB 7.2 (L) 2024    HCT 22.6 (L) 2024    .0 2024    MCH 31.9 2024    MCHC 31.9 2024    RDW 19.9 2024    .0 2024     Lab Results   Component Value Date     2024    K 3.4 (L) 2024    K 3.4 (L) 2024     2024    CO2 24.0 2024    BUN 8 (L) 2024    CREATSERUM 0.46 (L) 2024    GLU 97 2024    CA 8.3 (L) 2024     Lab Results   Component Value Date    INR 1.30 (H) 2024         Airway      Mallampati: II  Mouth opening: >3 FB  TM distance: 4 - 6 cm  Neck ROM: full Cardiovascular      Rhythm: irregular  Rate: abnormal     Dental    Dentition appears grossly intact         Pulmonary            (+) decreased breath sounds         Other  findings              ASA: 3   Plan: MAC  NPO status verified and patient meets guidelines.    Post-procedure pain management plan discussed with surgeon and patient.    Comment: Discussed holding DNR for procedure.   Plan/risks discussed with: patient  Use of blood product(s) discussed with: patient    Consented to blood products.          Present on Admission:   Atrial fibrillation with RVR (HCC)   Community acquired pneumonia, unspecified laterality   History of breast cancer   Metastasis from breast cancer (HCC)   Acute deep vein thrombosis (DVT) of axillary vein of right upper extremity (HCC)

## 2024-09-04 NOTE — PROCEDURES
Electrophysiology Procedure Note    Yesenia K Jose Alfredo Richey Location: Cath Lab   CSN [id number] MRN [id number]   Admission Date 8/31/2024  Operation Date 09/04/24    Attending Physician Damián Cordova MD Operating Physician Damáin Cordova MD     Pre-Operative Diagnosis:Atrial Fibrillation    Post-Operative Diagnosis: Same as above  PROCEDURE(S) PERFORMED:    1.    Cardioversion.  2.     Sedation      :  Damián Cordova MD     ANESTHESIA:  IV sedation.per Anesthesia  INDICATION:  Persistent Atrial Fibrillation     COMPLICATIONS:  None.     METHODS:  The patient was brought to the outpatient cardiac telemetry unit in a fasting and nonsedated state after providing informed consent.  IV sedation was administered during continuous ECG, pulse oximeter and noninvasive hemodynamic monitoring.  After administering IV Brevital in intermittent boluses, the desired level of sedation was achieved.      Cardioversion Energy:  360J    Pad Position: Base-Cat [place]  Pre- Cardioversion Rhythm- Af  Post Cardioversion Rhythm - NSR    CONCLUSIONS:  1.    Successful cardioversion       Plan:  1- Rhythm/Rate Control Meds:  no changes  2) Anticoagulation- no changes  3) Follow Up- 1 month                    Damián Cordova MD     Cardiac Electrophysiololgy  [facility] [place]

## 2024-09-04 NOTE — OPERATIVE REPORT
[facility]    Yesenia Daniels Patient Status:  Inpatient    1975 MRN [id number]   Location [facility] Attending Valentina Green,    Hosp Day # 4 PCP Eduardo Brooks MD     OPERATIVE REPORT:     DATE OF OPERATION: 24      PREOPERATIVE DIAGNOSIS(ES): thoracic lymphadenopathy, right middle lobe collapse     POSTOPERATIVE DIAGNOSIS(ES):   Thoracic lymphadenopathy  Suspected malignant airway obstruction due to mixed extrinsic and intrinsic compression of the right middle lobe bronchus     OPERATION(S) PERFORMED:   Bronchoscopy with endobronchial ultrasound-guided transbronchial needle aspiration of right paratracheal lymphadenopathy  Bronchoscopy with endobronchial biopsies of the obstructing tissue of the right middle lobe bronchus  Bronchoscopy with mechanical debridement of the obstructing tissue of the right middle lobe bronchus     SURGEON: Sixto Lockhart MD    ANESTHESIA: General. Please see separate flow sheet.      EQUIPMENT:   Olympus 7.5 MHz endobronchial ultrasound bronchoscope with dedicated 19-gauge ViziShot needle.   Standard pulmonary biopsy forceps  Olympus adult videobronchoscope.    INDICATION: The patient is a 49 year old with history of breast cancer admitted with dyspnea and found to have right middle lobe collapse and thoracic lymphadenopathy.  The procedure is being undertaken for diagnostic purposes, mediastinal staging and possible therapeutic benefit with relief of obstruction. Differential diagnosis, risks, benefits and alternatives were discussed at length. Alternatives such as mediastinoscopy and conventional transbronchial needle biopsy were discussed. Endobronchial ultrasound guided transbronchial needle aspiration is  superior to blind transbronchial needle aspiration and is the recommended approach for this indication.       CONSENT:  Risks and benefits were reviewed with the patient in detail regarding the procedure as well as anesthesia  prior to the procedure. All questions were answered, and the patient was agreeable.     PROCEDURE:  After informed consent was obtained, the patient was brought to the bronchoscopy suite.  Time out performed.  Patient was placed in a supine position, anesthesia administered, and topical lidocaine given for local anesthesia.     First, the ultrasound videobronchoscope was used and inserted via endotracheal tube. The bronchoscope was then advanced into the trachea. Evaluation of the trachea, left sided airways, right main stem, right upper lobe and right lower lobe airways were patent and normal with out evidence of endobronchial lesion to the segmental levels. However, the right middle lobe was completely occluded proximally with irregular friable tissue suspicious for malignancy.    Ultrasound examination revealed a 8 mm lymph node in the 11L left interlobar location, no lymph node in the 10L left hilar location, a 30 mm lymph node in the 7 subcarinal location, no mm lymph node in the 4L left paratracheal location, a 25 mm lymph node in the 4R right paratracheal location, no lymph node in the 10R right hilar location, and a 10 mm lymph node in the 11R right interlobar location.    8 passes were taken at the 4R right paratracheal location, with 1 passes given to the cytotechnician/cytopathologist for screening with report of malignancy, and the others placed into saline for processing.     Next, the ultrasound bronchoscope was withdrawn and the conventional videobronchoscope was introduced into the airway.  The airway was as detailed above with patent left sided airways, right main stem, right upper lobe and right lower lobe airways however the right middle lobe was completely obstructed proximally by suspected malignancy. Endobronchial biopsies were performed using the biopsy forceps and samples placed into formalin for analysis.  Further mechanical debridement was performed on the airway proximally with some slight  success however further distally I could not visualize any viable distal airways and only visualized more tumor endobronchially as well as extrinsic compression of the entire airway. Due to the lack of viable distal airways, there was no benefit to further debridement or intervention. Hemostasis was observed and the bronchoscope was then withdrawn. The patient tolerated the procedure well without immediate complications.     EBL:  <5ml     IMPRESSION:   Thoracic lymphadenopathy  Suspected malignant airway obstruction due to mixed extrinsic and intrinsic compression of the right middle lobe bronchus     PLAN:   await results of bronchoscopy for further delineation of care.      Sixto Lockhart MD

## 2024-09-04 NOTE — ANESTHESIA POSTPROCEDURE EVALUATION
[facility]    Yesenia HODGES Jose Alfredo Ilya ESTRADA Patient Status:  Inpatient   Age/Gender 49 year old female MRN [id number]   Location [facility] 3NE-A Attending Valentina Green DO   Hosp Day # 4 PCP Eduardo Brooks MD       Anesthesia Post-op Note        Procedure Summary       Date: 09/04/24 Room / Location: [facility] Interventional Suites    Anesthesia Start: 1638 Anesthesia Stop: 1700    Procedure: CATH CARDIOVERSION Diagnosis: (afib)    Scheduled Providers:  Anesthesiologist: Jonathan Moss MD    Anesthesia Type: MAC ASA Status: 3            Anesthesia Type: MAC    Vitals Value Taken Time   /66 09/04/24 1703   Temp 97.6 09/04/24 1703   Pulse 77 09/04/24 1702   Resp 17 09/04/24 1702   SpO2 95 % 09/04/24 1702   Vitals shown include unfiled device data.    Patient Location: PACU    Anesthesia Type: MAC    Airway Patency: patent    Postop Pain Control: adequate    Nausea/Vomiting: none    Cardiopulmonary/Hydration status: stable euvolemic    Complications: no apparent anesthesia related complications    Postop vital signs: stable    Dental Exam: Unchanged from Preop    Patient to be discharged from PACU when criteria met.

## 2024-09-04 NOTE — PAYOR COMM NOTE
--------------  ADMISSION REVIEW     Payor: JUAN PINEDO  Subscriber #:  [id number]  Authorization Number: [id number]    Admit date: 8/31/24  Admit time:  8:15 AM       History   Patient is a 49-year-old female with extensive medical history including triple negative breast cancer with metastasis and inflammatory breast cancer.  Patient reports that she is on Xarelto for prior blood clot she has a port in place she recently had a new round of chemo and feels that her elevated heart rate is due to this she also has a history of A-fib.  Patient is in A-fib currently.  Patient reports that she did take albuterol prior to arrival.  She reports that she is feeling short of breath.  She denies any fevers or cough.  Respiratory:  Positive for shortness of breath. Negative for cough.    Cardiovascular:  Positive for palpitations. Negative for chest pain.     ED Triage Vitals [08/31/24 0332]   /71   Pulse (!) 130   Resp 22   Temp 98.1 °F (36.7 °C)   SpO2 99 %   O2 Device None (Room air)      Appearance: She is well-developed. She is obese. She is not ill-appearing, toxic-appearing or diaphoretic.   HENT:      Head: Normocephalic and atraumatic.   Eyes:      Comments: Patient wearing sunglasses, she denies migraine headache   Cardiovascular:      Rate and Rhythm: Tachycardia present. Rhythm irregular.   Pulmonary:      Effort: Pulmonary effort is normal. No tachypnea, accessory muscle usage or respiratory distress.      Breath sounds: Normal breath sounds. No wheezing.   Musculoskeletal:         General: Normal range of motion.      Cervical back: Normal range of motion.   Skin:     General: Skin is warm.      Capillary Refill: Capillary refill takes less than 2 seconds.   Neurological:      General: No focal deficit present.      Mental Status: She is alert and oriented to person, place, and time.   Psychiatric:         Mood and Affect: Mood is anxious.          Labs Reviewed   CBC WITH DIFFERENTIAL WITH PLATELET -  Abnormal; Notable for the following components:       Result Value    RBC 2.65 (*)     HGB 8.3 (*)     HCT 26.2 (*)     Lymphocyte Absolute 0.58 (*)    EKG    Rate, intervals and axes as noted on EKG Report.  Rate: 118  Rhythm: Atrial Fibrillation  Reading: Atrial for with RVR no ST elevation [place] interval does not exist QRS of 100 QTc of 456 with axis of 86/-35     Chest x-ray shows moderate to large region of consolidation in the right lung base probably pneumonia related to the history of patient's cancer or combination of both.  Additional faint groundglass opacities in the left mid lung zone.  Right Mediport.  Cardiomediastinal silhouette is unremarkable no pneumothorax or pleural effusion no acute bony abnormality.  CT scan shows study is technically diagnostic no evidence of pulmonary embolism severe skin thickening in the left breast, patible with history of breast cancer enlargement of mediastinal and right axillary lymph nodes.  Occlusion of the right middle lobe bronchus with collapse of the right middle lobe.  Other nodular patchy consolidation in the right lower lobe and mild interstitial thickening probably pneumonia however difficult to exclude malignancy with lymphangitic spread of carcinoma or combination of the above trace right pleural effusion right medial port mass extending from the greater curvature of the stomach.  Images of the aorta without evidence of aneurysm.  Admission disposition: 8/31/2024  5:59 AM    Disposition and Plan   Clinical Impression:  1. Atrial fibrillation with RVR (HCC)    2. Community acquired pneumonia, unspecified laterality    3. Anemia, unspecified type    4. History of breast cancer       Disposition:  Admit  8/31/2024  5:59 am       History and Physical   History of Present Illness:    Yesenia Daniels is a 49 year old female with history of metastatic breast cancer to lungs and brain, atrial fibrillation, anemia presents emergency room with shortness of breath  that started 3 days ago.  No fevers, chills, nausea, vomiting, diarrhea or constipation.  No chest pain but did have some palpitations.  No dizziness, lightheadedness, or syncope.  No numbness or ting of extremities or any focalized weakness.  /83   Pulse 98   Temp 98.1 °F (36.7 °C)   Resp 20   Ht 5' 8\" (1.727 m)   Wt (!) 315 lb (142.9 kg)   SpO2 99%   BMI 47.90 kg/m²     General: Alert  Respiratory: No rhonchi, no wheezes  Cardiovascular: S1, S2. Regular rate and rhythm  Abdomen: Soft, Non-tender, non-distended, positive bowel sounds  Neuro: No new focal deficits  Extremities: No edema     Lab 08/31/24  0352   RBC 2.65*   HGB 8.3*   HCT 26.2*   MCV 98.9   MCH 31.3   MCHC 31.7   RDW 21.3   NEPRELIM 2.88   WBC 4.3   .0      Lab 08/31/24  0352   GLU 92   BUN 14   CREATSERUM 0.56   EGFRCR 112   CA 9.2   ALB 3.6      K 3.6      CO2 24.0   ALKPHO 64   AST 19   ALT 23   BILT 0.7   TP 5.9      Lab 08/31/24  0352   TROPHS 12      Assessment & Plan:    #Atrial fibrillation with RVR  - Will continue on cardizem drip  - Pt already on xarelto     # Pneumonia  - RLL on imaging  - Continue cefriaxone and azithromycin  - CUltures pending     # Recent history of DVT in the right upper extremity  -Will continue on Xarelto     # Breast cancer  - Pt on chemotherapy     # Anemia  - Likely multifactorial with cancer, chemo and infection.   - Will check iron studies.         9/1/24  Patient  HR elevated, on CAridzem gtt. Feels SOB .      Temp:  [97.5 °F (36.4 °C)-98.7 °F (37.1 °C)] 98.7 °F (37.1 °C)  Pulse:  [] 95  Resp:  [17-20] 17  BP: (102-171)/() 131/64  SpO2:  [92 %-99 %] 96 %     Physical Exam:    Respiratory: No wheezes, no rhonchi  Cardiovascular: S1, S2, regular rate and rhythm  Abdomen: Soft, Non-tender, non-distended, positive bowel sounds  Neuro: No new focal deficits.   Extremities: No edema     Lab 08/31/24  0352 09/01/24  0506   WBC 4.3 4.8   HGB 8.3* 8.1*   MCV 98.9 102.7*   PLT  415.0 436.0      Lab 08/31/24  0352 08/31/24  1610 09/01/24  0506   GLU 92  --  85   BUN 14  --  11   CREATSERUM 0.56  --  0.60   CA 9.2  --  8.8   ALB 3.6  --   --      --  141   K 3.6 3.8 3.9  3.9     --  107   CO2 24.0  --  24.0   Medications:    Levalbuterol Tartrate  2 puff Inhalation q6h    famotidine  20 mg Oral BID    rivaroxaban  20 mg Oral Daily with dinner    cefTRIAXone  2 g Intravenous Daily    azithromycin  500 mg Intravenous Q24H    fluticasone furoate  1 puff Inhalation Daily    potassium chloride  40 mEq Oral Once    fentaNYL  1 patch Transdermal Q72H    loratadine  10 mg Oral BID      Assessment & Plan:    #Afib, RVR  On cardizem gtt  Cards on CS  #Suspected PNA  Iv Abx  #Breast ca with skin, lung, CNs mets  Will have pulm see given lung collapse, PNA and pt does not have a pulm   Pain control - continue fentanyl, tolerating Norco for now  #Anemia due to chemo, malignancy   #h/o RUE DVT  DOAC       9/2/24  No appetite this morning      Temp:  [98.3 °F (36.8 °C)-99.1 °F (37.3 °C)] 98.7 °F (37.1 °C)  Pulse:  [] 63  Resp:  [16-20] 16  BP: (105-154)/() 135/49  SpO2:  [90 %-96 %] 91 %     Physical Exam:    Respiratory: No wheezes, no rhonchi- ausc anteriorly   Cardiovascular: S1, S2, irregular rate and rhythm  Abdomen: Soft, Non-tender, non-distended, positive bowel sounds  Extremities: No edema  Lab 08/31/24  0352 09/01/24  0506 09/02/24  0528   WBC 4.3 4.8 4.8   HGB 8.3* 8.1* 7.3*   MCV 98.9 102.7* 101.3*   .0 436.0 410.0      Lab 08/31/24  0352 08/31/24  1610 09/01/24  0506 09/02/24  0528   GLU 92  --  85 91   BUN 14  --  11 7*   CREATSERUM 0.56  --  0.60 0.50*   CA 9.2  --  8.8 8.5*   ALB 3.6  --   --   --      --  141 139   K 3.6 3.8 3.9  3.9 3.5     --  107 107   CO2 24.0  --  24.0 26.0   Assessment & Plan:    #Afib, RVR  On cardizem gtt  Cards on CS  #Suspected PNA, Lung collapse on CT due to malignancy ?  Pulm on CS  Iv Abx  #Breast ca with skin,  lung, CNs mets  Will have pulm see given lung collapse, PNA and pt does not have a pulm   Pain control - continue fentanyl, tolerating Norco for now  #Anemia due to chemo, malignancy   #h/o RUE DVT  DOAC      9/3/24  Hypoxic overnight, placed on oxygen, wants to go home but feels about the same      Temp:  [98.3 °F (36.8 °C)-100 °F (37.8 °C)] 99.1 °F (37.3 °C)  Pulse:  [70-90] 82  Resp:  [15-20] 17  BP: (106-134)/(56-74) 115/56  SpO2:  [90 %-94 %] 94 %     Physical Exam:    Respiratory: No wheezes, no rhonchi- ausc anteriorly   Cardiovascular: S1, S2, irregular rate and rhythm  Abdomen: Soft, Non-tender, non-distended, positive bowel sounds  Extremities: No edema  Lab 08/31/24  0352 09/01/24  0506 09/02/24  0528   WBC 4.3 4.8 4.8   HGB 8.3* 8.1* 7.3*   MCV 98.9 102.7* 101.3*   .0 436.0 410.0      Lab 08/31/24  0352 09/01/24  0506 09/02/24  0528 09/03/24  0529   GLU 92 85 91 89   BUN 14 11 7* 8*   CREATSERUM 0.56 0.60 0.50* 0.48*   CA 9.2 8.8 8.5* 8.4*   ALB 3.6  --   --   --     141 139 138   K 3.6 3.9  3.9 3.5 3.6    107 107 106   CO2 24.0 24.0 26.0 27.0   ALKPHO 64  --   --   --    AST 19  --   --   --    ALT 23  --   --   --    BILT 0.7  --   --   --    TP 5.9  --   --   --        Medications:    dilTIAZem  60 mg Oral 4 times per day    metoprolol tartrate  25 mg Oral 2x Daily(Beta Blocker)    rivaroxaban  15 mg Oral Daily with dinner    Levalbuterol Tartrate  2 puff Inhalation q6h    digoxin  250 mcg Intravenous Once    famotidine  20 mg Oral BID    cefTRIAXone  2 g Intravenous Daily    fluticasone furoate  1 puff Inhalation Daily    potassium chloride  40 mEq Oral Once    fentaNYL  1 patch Transdermal Q72H    loratadine  10 mg Oral BID     Assessment & Plan:    #Afib, RVR  -Cardizem  -Xarelto - pt refusing     #RML postobstructive pneumonia   -Pulm on CS, bronchoscopy planned  -iv Abx     #Breast ca with skin, lung, CNs mets  -Pain control - continue fentanyl, tolerating [place] for now      #Chronic Anemia due to chemo, malignancy   #h/o RUE DVT       9/4/24  S/p bronch, feels about the same, pain controlled      Temp:  [97 °F (36.1 °C)-99.8 °F (37.7 °C)] 99.1 °F (37.3 °C)  Pulse:  [] 108  Resp:  [18-23] 19  BP: ()/() 101/53  SpO2:  [88 %-96 %] 94 %     Physical Exam:    Respiratory: No wheezes, no rhonchi- ausc anteriorly   Cardiovascular: S1, S2, irregular rate and rhythm  Abdomen: Soft, Non-tender, non-distended, positive bowel sounds  Extremities: No edema     Lab 08/31/24  0352 09/01/24  0506 09/02/24  0528 09/03/24  1117 09/03/24  1654 09/04/24  0523   WBC 4.3 4.8 4.8 5.9  --  5.6   HGB 8.3* 8.1* 7.3* 7.0*  --  7.2*   MCV 98.9 102.7* 101.3* 101.4*  --  100.0   .0 436.0 410.0 403.0  --  397.0   INR  --   --   --   --  1.30*  --       Lab 08/31/24  0352 09/02/24  0528 09/03/24  0529 09/04/24  0523   GLU 92 91 89 97   BUN 14 7* 8* 8*   CREATSERUM 0.56 0.50* 0.48* 0.46*   CA 9.2 8.5* 8.4* 8.3*   ALB 3.6  --   --   --     139 138 136   K 3.6 3.5 3.6 3.4*  3.4*    107 106 105   CO2 24.0 26.0 27.0 24.0   Assessment & Plan:       #Hypoxia 2/2 RML malignant airway obstruction  -s/p Bronch/EBUS with mechanical debridement  -Await biopsy results   -no obvious PNA evidence per bronch report, Continue Empiric course of Ceftriaxone      #Afib, RVR  -Cardizem/Metoprolol  -Xarelto (resume 9/5)     #Metastatic Breast Cancer  -Pain control - continue fentanyl patch, PRN Norco      #Chronic Anemia due to chemo, malignancy   #h/o RUE DVT    MEDICATIONS ADMINISTERED IN LAST 1 DAY:  dexamethasone (Decadron) 4 MG/ML injection       Date Action Dose Route User    9/4/2024 0729 Given 4 mg Intravenous Jimi Norman MD          dilTIAZem (cardIZEM) tab 60 mg       Date Action Dose Route User    9/4/2024 1119 Given 60 mg Oral Socorro Medrano, RN    9/4/2024 0509 Given 60 mg Oral Mey Basurto RN    9/3/2024 2334 Given 60 mg Oral Mey Basurto RN    9/3/2024 1700 Given 60 mg Oral  Socorro Medrano RN          famotidine (Pepcid) tab 20 mg       Date Action Dose Route User    9/4/2024 1119 Given 20 mg Oral Socorro Medrano RN    9/3/2024 2201 Given 20 mg Oral Mey Basurto RN          fentaNYL (Duragesic) 50 MCG/HR patch 1 patch       Date Action Dose Route User    9/3/2024 2219 Fentanyl Patch Applied 1 patch Transdermal (Left Upper Arm) Mey Basurto RN          fluticasone furoate (Arnuity Ellipta) 100 MCG/ACT inhaler 1 puff       Date Action Dose Route User    9/4/2024 1119 Given 1 puff Inhalation Socorro Medrano RN          furosemide (Lasix) 10 mg/mL injection 20 mg       Date Action Dose Route User    9/4/2024 1229 Given 20 mg Intravenous Socorro Medrano RN          HYDROcodone-acetaminophen (Norco) 5-325 MG per tab 2 tablet       Date Action Dose Route User    9/4/2024 1235 Given 2 tablet Oral Socorro Medrano RN    9/3/2024 1921 Given 2 tablet Oral Socorro Medrano RN     loratadine (Claritin Reditabs) oral disintegrating tablet TBDP 10 mg       Date Action Dose Route User    9/4/2024 1119 Given 10 mg Oral Socorro Medrano RN    9/3/2024 2200 Given 10 mg Oral Mey Basurto RN          LORazepam (Ativan) tab 0.5 mg       Date Action Dose Route User    9/4/2024 1457 Given 0.5 mg Oral Socorro Medrano RN          magnesium oxide (Mag-Ox) tab 800 mg       Date Action Dose Route User    9/4/2024 1118 Given 800 mg Oral Socorro Medrano RN          metoprolol tartrate (Lopressor) tab 25 mg       Date Action Dose Route User    9/4/2024 0509 Given 25 mg Oral Mey Basurto RN    9/3/2024 1700 Given 25 mg Oral Socorro Medrano RN          metoprolol (Lopressor) 5 mg/5mL injection 5 mg       Date Action Dose Route User    9/4/2024 1228 Given 5 mg Intravenous Socorro Medrano RN          ondansetron (Zofran) 4 MG/2ML injection 4 mg       Date Action Dose Route User    9/4/2024 1118 Given 4 mg Intravenous Socorro Medrano, KATHERINE     potassium chloride 40 mEq in 250mL sodium chloride 0.9% IVPB premix        Date Action Dose Route User    9/4/2024 1131 New Bag 40 mEq Intravenous Socorro Medrano, RN     Vitals (last day)       Date/Time Temp Pulse Resp BP SpO2 Weight O2 Device O2 Flow Rate (L/min) [facility]    09/04/24 1230 98 °F (36.7 °C) 66 20 138/109 96 % -- Nasal cannula 2 L/min KA    09/04/24 1000 97.6 °F (36.4 °C) 90 23 112/68 91 % -- Nasal cannula 2 L/min JG    09/04/24 0830 -- 108 19 101/53 94 % -- Nasal cannula 3 L/min DK    09/04/24 0825 -- 118 19 111/78 88 % -- -- -- DK    09/04/24 0820 -- 127 23 138/121 89 % -- -- -- DK    09/04/24 0819 -- -- -- -- -- -- Face tent 8 L/min DK    09/04/24 0819 99.1 °F (37.3 °C) 101 18 121/51 96 % -- -- -- SA    09/04/24 0814 99 °F (37.2 °C) 132 20 125/104 92 % -- Nasal cannula 2 L/min DK    09/03/24 1926 99.8 °F (37.7 °C) 92 19 96/50 95 % -- Nasal cannula 2 L/min AS    09/03/24 1159 97 °F (36.1 °C) 85 18 119/70 92 % -- Nasal cannula 2 L/min MV    09/03/24 0821 97 °F (36.1 °C) 85 18 106/71 95 % -- Nasal cannula 2 L/min MV    09/03/24 0518 99.1 °F (37.3 °C) -- -- -- -- -- -- -- KB    09/03/24 0518 -- 82 17 115/56 94 % -- -- 2 L/min MF

## 2024-09-04 NOTE — PLAN OF CARE
Patient had an elective cardioversion today with Dr. Cordova. Dr. Moss provided the anesthesia. Patient had successful cardioversion and converted to sinus rhythm. 12 lead EKG completed. Post cardioversion, patient WONG, A/O x3. Dr. Cordova spoke with patient's . Report called to KATHERINE Quintanilla. Awaiting transport.     Transport here to bring patient back to [id number].

## 2024-09-04 NOTE — PROGRESS NOTES
Progress Note  Yesenia Daniels Patient Status:  Inpatient    1975 MRN [id number]   Location [facility] 3NE-A Attending Valentina Green, DO   Hosp Day # 4 PCP Eduardo Brooks MD     Subjective:   at bedside  Long discussion regarding goals of symptom management while transitioning to hospice  Currently feels symptomatic with elevated HR    Objective:  /76 (BP Location: Left arm)   Pulse 96   Temp 97.8 °F (36.6 °C) (Oral)   Resp 16   Ht 5' 8\" (1.727 m)   Wt (!) 315 lb (142.9 kg)   SpO2 94%   BMI 47.90 kg/m²     Telemetry: AFRVR, rates 110-130s    Intake/Output:    Intake/Output Summary (Last 24 hours) at 2024 1702  Last data filed at 9/3/2024 2150  Gross per 24 hour   Intake 360 ml   Output --   Net 360 ml     Last 3 Weights   24 0823 (!) 315 lb (142.9 kg)   24 0332 (!) 315 lb (142.9 kg)   24 1006 285 lb (129.3 kg)   23 1657 286 lb (129.7 kg)     Labs:  Recent Labs   Lab 24  0528 24  0529 24  0523   GLU 91 89 97   BUN 7* 8* 8*   CREATSERUM 0.50* 0.48* 0.46*   EGFRCR 115 116 117   CA 8.5* 8.4* 8.3*    138 136   K 3.5 3.6 3.4*  3.4*    106 105   CO2 26.0 27.0 24.0     Recent Labs   Lab 24  0352 24  0506 24  0528 24  1117 24  0523   RBC 2.65* 2.55* 2.30* 2.19* 2.26*   HGB 8.3* 8.1* 7.3* 7.0* 7.2*   HCT 26.2* 26.2* 23.3* 22.2* 22.6*   MCV 98.9 102.7* 101.3* 101.4* 100.0   MCH 31.3 31.8 31.7 32.0 31.9   MCHC 31.7 30.9* 31.3 31.5 31.9   RDW 21.3 21.0 20.5 20.1 19.9   NEPRELIM 2.88 3.38  --  4.46  --    WBC 4.3 4.8 4.8 5.9 5.6   .0 436.0 410.0 403.0 397.0     Recent Labs   Lab 24  0352 24  0529   TROPHS 12 12     Lab Results   Component Value Date/Time    HDL 50 10/02/2020 06:35 AM    LDL 92 10/02/2020 06:35 AM    TRIG 103 10/02/2020 06:35 AM     Lab Results   Component Value Date    DDIMER 0.96 (H) 2021     Lab Results   Component Value Date    TSH 2.212 2024     Review of  Systems:   Constitutional: No fevers, chills, fatigue or night sweats.  ENT: No mouth pain, neck pain, running nose, headaches or swollen glands.  Skin: No rashes, pruritus or skin changes,  Respiratory: Denies cough, wheezing or shortness of breath.  CV: Denies chest pain, palpitations, orthopnea, PND or dizziness.  Musculoskeletal: No joint pain, stiffness or swelling.  GI: No nausea, vomiting or diarrhea. No blood in stools.  Neurologic: No seizures, tremors, weakness or numbness.     Physical Exam:  General: Alert, cooperative, no distress, appears stated age.  Neck: Supple, symmetrical, trachea midline, no adenopathy, thyroid: no enlargment/tenderness/nodules, no carotid bruit and no JVD.  Lungs: Clear to auscultation bilaterally.  Chest wall: No tenderness or deformity.  Heart: Irregularly irregular rate and rhythm, tachycardic, S1, S2 normal, no murmur, click, rub or gallop.  Abdomen: Soft, non-tender. Bowel sounds normal. No masses,  No organomegaly.  Extremities: Extremities normal, atraumatic, no cyanosis, 1+ BLE/BUE edema.  Pulses: 2+ and symmetric all extremities.  Neurologic: Grossly intact.    Medications:   [START ON 9/5/2024] rivaroxaban  10 mg Oral Daily with dinner    senna-docusate  2 tablet Oral Daily    [START ON 9/5/2024] cefTRIAXone  2 g Intravenous Daily    dilTIAZem  60 mg Oral 4 times per day    metoprolol tartrate  25 mg Oral 2x Daily(Beta Blocker)    Levalbuterol Tartrate  2 puff Inhalation q6h    digoxin  250 mcg Intravenous Once    famotidine  20 mg Oral BID    fluticasone furoate  1 puff Inhalation Daily    potassium chloride  40 mEq Oral Once    fentaNYL  1 patch Transdermal Q72H    loratadine  10 mg Oral BID      sodium chloride     Assessment:    Paroxysmal Atrial fibrillation with RVR  Remains in AF with rates elevated on telemetry today in 110s-130s  -s/p bronchoscopy this morning   -PO diltiazem 60mg Q6hrs, metoprolol 25mg BID, can increase for better HR control  -flecainide  given x 1 dose earlier this admission, no improvement in rhythm  -TSH normal 2.2  -[id number]= 1 for gender, on xarelto low dose due to bleeding from her open wounds, she is refusing therapeutic dosing despite education provided on elevated stroke risk with lower, non-therapeutic dose  -normal LV function 60% on last echo in 2023  -Hx DCCV in 2020  -wants to control HR as she is symptomatic with RVR with shortness of breath/palpitations    Stage IV metastatic breast CA with cutaneous involvement on back and chest   Chemotherapy regimen recently changed, possibly contributing to AFRVR recurrence  -bronchoscopy today for thoracic lymphadenopathy/staging, right middle lobe collapse  -biopsy pending  -signed consent for hospice per RN, do not see notes or orders in chart to confirm, social work consult pending  -requiring 3L NC    Pneumonia  Abx per primary  -inhalers, nebs    Hypokalemia  K+3.4  -replete per protocol    Hypomagnesemia  Mag 1.5  -replete per protocol     Anemia  Hgb 7.2, stable    Hx DVT  RUE DVT  -xarelto 10mg at home, increased to 20mg for therapeutic dosing for AF, refusing due to wound bleeding      Plan:  -Start IV lopressor 5mg Q6hr PRN for HR sustained >120, give IV dose now  -Continue PO lopressor 25mg BID, she expressed desire to actively control HR as a comfort measure as she transitions to hospice  -Continue diltiazem 60mg Q6hrs, xarelto 10mg  -Give one time dose IV lasix 20mg now  -Further recs per primary/hospice/palliative  -Long discussion regarding plan of care, she is comfortable and accepting of her current plan at this time      Plan of care discussed with patient, RN.        hCaro Yusuf, APRN  9/4/2024  11:24 AM  [phone] [place]  [phone] [place]    ____________________________  Pt seen and examined and discussed with the APN.    Tele: AF    Exam  General- awake alert  HEENT- PEERLA  Neck- JVP normal  CV- irreg irreg  Lungs- CTA  Extremities-   Neuro- Normal  Psych-  mood/affect congruent  Skin- no lesions    I have personally performed the medical decision making in its entirety. My additions include:  Plan  - Discussed DCCV with known risk by not being on Af dosing for xarelto or NEFTALY. Pt wants to proceed despite these risks. She is going to hospice and feels poorly.    Discussed higher stroke risk with pt and her  and they want to proceed today    DCCV today    R3    ____________________________  Damián Cordova MD, FACC, FHRS  Cardiac Electrophysiololgy  [facility]  9/4/2024

## 2024-09-04 NOTE — PLAN OF CARE
Assumed care at 0730  A/Ox4. 2L NC. Afib on tele   C/o pain- Norco given per MAR  R chest port a cath  Cardiac EP- Potassium and Mg replaced  Regular diet- not much of an appetite  IV Rocephin q24   Pt will transition to home hospice   Safety precautions in place.  at bedside. All needs met at this time

## 2024-09-04 NOTE — PROGRESS NOTES
[facility]   part of [facility]     Hospitalist Progress Note     Yesenia Daniels Patient Status:  Inpatient    1975 MRN [id number]   Location [facility] 3NE-A Attending Monica Loyola MD   Hosp Day # 4 PCP Eduardo Brooks MD     Chief Complaint: Afib     Subjective:     S/p bronch, feels about the same, pain controlled     Objective:    Review of Systems:   A comprehensive review of systems was completed; pertinent positive and negatives stated in subjective.    Vital signs:  Temp:  [97 °F (36.1 °C)-99.8 °F (37.7 °C)] 99.1 °F (37.3 °C)  Pulse:  [] 108  Resp:  [18-23] 19  BP: ()/() 101/53  SpO2:  [88 %-96 %] 94 %    Physical Exam:    General: No acute distress  Respiratory: No wheezes, no rhonchi- ausc anteriorly   Cardiovascular: S1, S2, irregular rate and rhythm  Abdomen: Soft, Non-tender, non-distended, positive bowel sounds  Extremities: No edema      Diagnostic Data:    Labs:  Recent Labs   Lab 24  0352 24  0506 24  0528 24  1117 24  1654 24  0523   WBC 4.3 4.8 4.8 5.9  --  5.6   HGB 8.3* 8.1* 7.3* 7.0*  --  7.2*   MCV 98.9 102.7* 101.3* 101.4*  --  100.0   .0 436.0 410.0 403.0  --  397.0   INR  --   --   --   --  1.30*  --        Recent Labs   Lab 24  0352 24  1610 24  0528 24  0529 24  0523   GLU 92   < > 91 89 97   BUN 14   < > 7* 8* 8*   CREATSERUM 0.56   < > 0.50* 0.48* 0.46*   CA 9.2   < > 8.5* 8.4* 8.3*   ALB 3.6  --   --   --   --       < > 139 138 136   K 3.6   < > 3.5 3.6 3.4*  3.4*      < > 107 106 105   CO2 24.0   < > 26.0 27.0 24.0   ALKPHO 64  --   --   --   --    AST 19  --   --   --   --    ALT 23  --   --   --   --    BILT 0.7  --   --   --   --    TP 5.9  --   --   --   --     < > = values in this interval not displayed.       Estimated Creatinine Clearance: 149.2 mL/min (A) (based on SCr of 0.46 mg/dL (L)).    Recent Labs   Lab 24  0352 24  0529   TROPHS 12  12       Recent Labs   Lab 09/03/24  1654   PTP 16.2*   INR 1.30*                  Microbiology    Hospital Encounter on 08/31/24   1. Blood Culture     Status: None (Preliminary result)    Collection Time: 08/31/24  4:12 AM    Specimen: Blood,peripheral   Result Value Ref Range    Blood Culture Result No Growth 3 Days N/A         Imaging: Reviewed in Epic.    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  -s/p Bronch/EBUS with mechanical debridement  -Await biopsy results   -no obvious PNA evidence per bronch report, Continue Empiric course of Ceftriaxone     #Afib, RVR  -Cardizem/Metoprolol  -Xarelto (resume 9/5)    #Metastatic Breast Cancer  -Pain control - continue fentanyl patch, PRN Norco     #Chronic Anemia due to chemo, malignancy   #h/o RUE DVT      Supplementary Documentation:     Quality:  DVT Mechanical Prophylaxis:        DVT Pharmacologic Prophylaxis   Medication    rivaroxaban (Xarelto) tab 10 mg    heparin (Porcine) 100 Units/mL lock flush 500 Units                Code Status: Not on file  Crawford: No urinary catheter in place  Crawford Duration (in days):   Central line:    JACQUIE: 8/31/2024    Discharge is dependent on: clinical   At this point Ms. Daniels is expected to be discharge to: home    The 21st Century Cures Act makes medical notes like these available to patients in the interest of transparency. Please be advised this is a medical document. Medical documents are intended to carry relevant information,  facts as evident, and the clinical opinion of the practitioner. The medical note is intended as peer to peer communication and may appear blunt or direct. It is written in medical language and may contain abbreviations or verbiage that are unfamiliar.

## 2024-09-04 NOTE — PROGRESS NOTES
Progress Note  Yesenia Daniels Patient Status:  Inpatient    1975 MRN [id number]   [facility] 3NE-A Attending Valentina Green, DO   Hosp Day # 4 PCP Eduardo Brooks MD     Subjective:   at bedside  Long discussion regarding goals of symptom management while transitioning to hospice  Currently feels symptomatic with elevated HR    Objective:  /59   Pulse 107   Temp 97.4 °F (36.3 °C) (Temporal)   Resp 20   Ht 5' 8\" (1.727 m)   Wt (!) 315 lb (142.9 kg)   SpO2 97%   BMI 47.90 kg/m²     Telemetry: AFRVR, rates 110-130s    Intake/Output:    Intake/Output Summary (Last 24 hours) at 2024 1124  Last data filed at 9/3/2024 2150  Gross per 24 hour   Intake 360 ml   Output --   Net 360 ml     Last 3 Weights   24 0823 (!) 315 lb (142.9 kg)   24 0332 (!) 315 lb (142.9 kg)   24 1006 285 lb (129.3 kg)   23 1657 286 lb (129.7 kg)     Labs:  Recent Labs   Lab 24  0528 24  0529 24  0523   GLU 91 89 97   BUN 7* 8* 8*   CREATSERUM 0.50* 0.48* 0.46*   EGFRCR 115 116 117   CA 8.5* 8.4* 8.3*    138 136   K 3.5 3.6 3.4*  3.4*    106 105   CO2 26.0 27.0 24.0     Recent Labs   Lab 24  0352 24  0506 24  0528 24  1117 24  0523   RBC 2.65* 2.55* 2.30* 2.19* 2.26*   HGB 8.3* 8.1* 7.3* 7.0* 7.2*   HCT 26.2* 26.2* 23.3* 22.2* 22.6*   MCV 98.9 102.7* 101.3* 101.4* 100.0   MCH 31.3 31.8 31.7 32.0 31.9   MCHC 31.7 30.9* 31.3 31.5 31.9   RDW 21.3 21.0 20.5 20.1 19.9   NEPRELIM 2.88 3.38  --  4.46  --    WBC 4.3 4.8 4.8 5.9 5.6   .0 436.0 410.0 403.0 397.0     Recent Labs   Lab 24  0352 24  0529   TROPHS 12 12     Lab Results   Component Value Date/Time    HDL 50 10/02/2020 06:35 AM    LDL 92 10/02/2020 06:35 AM    TRIG 103 10/02/2020 06:35 AM     Lab Results   Component Value Date    DDIMER 0.96 (H) 2021     Lab Results   Component Value Date    TSH 2.212 2024     Review of Systems:    Constitutional: No fevers, chills, fatigue or night sweats.  ENT: No mouth pain, neck pain, running nose, headaches or swollen glands.  Skin: No rashes, pruritus or skin changes,  Respiratory: Denies cough, wheezing or shortness of breath.  CV: Denies chest pain, palpitations, orthopnea, PND or dizziness.  Musculoskeletal: No joint pain, stiffness or swelling.  GI: No nausea, vomiting or diarrhea. No blood in stools.  Neurologic: No seizures, tremors, weakness or numbness.     Physical Exam:  General: Alert, cooperative, no distress, appears stated age.  Neck: Supple, symmetrical, trachea midline, no adenopathy, thyroid: no enlargment/tenderness/nodules, no carotid bruit and no JVD.  Lungs: Clear to auscultation bilaterally.  Chest wall: No tenderness or deformity.  Heart: Irregularly irregular rate and rhythm, tachycardic, S1, S2 normal, no murmur, click, rub or gallop.  Abdomen: Soft, non-tender. Bowel sounds normal. No masses,  No organomegaly.  Extremities: Extremities normal, atraumatic, no cyanosis, 1+ BLE/BUE edema.  Pulses: 2+ and symmetric all extremities.  Neurologic: Grossly intact.    Medications:   [START ON 9/5/2024] rivaroxaban  10 mg Oral Daily with dinner    senna-docusate  2 tablet Oral Daily    [START ON 9/5/2024] cefTRIAXone  2 g Intravenous Daily    potassium chloride  40 mEq Intravenous Once    dilTIAZem  60 mg Oral 4 times per day    metoprolol tartrate  25 mg Oral 2x Daily(Beta Blocker)    Levalbuterol Tartrate  2 puff Inhalation q6h    digoxin  250 mcg Intravenous Once    famotidine  20 mg Oral BID    fluticasone furoate  1 puff Inhalation Daily    potassium chloride  40 mEq Oral Once    fentaNYL  1 patch Transdermal Q72H    loratadine  10 mg Oral BID     Assessment:    Paroxysmal Atrial fibrillation with RVR  Remains in AF with rates elevated on telemetry today in 110s-130s  -s/p bronchoscopy this morning   -PO diltiazem 60mg Q6hrs, metoprolol 25mg BID, can increase for better HR  control  -flecainide given x 1 dose earlier this admission, no improvement in rhythm  -TSH normal 2.2  -[id number]= 1 for gender, on xarelto low dose due to bleeding from her open wounds, she is refusing therapeutic dosing despite education provided on elevated stroke risk with lower, non-therapeutic dose  -normal LV function 60% on last echo in 2023  -Hx DCCV in 2020  -wants to control HR as she is symptomatic with RVR with shortness of breath/palpitations    Stage IV metastatic breast CA with cutaneous involvement on back and chest   Chemotherapy regimen recently changed, possibly contributing to AFRVR recurrence  -bronchoscopy today for thoracic lymphadenopathy/staging, right middle lobe collapse  -biopsy pending  -signed consent for hospice per RN, do not see notes or orders in chart to confirm, social work consult pending  -requiring 3L NC    Pneumonia  Abx per primary  -inhalers, nebs    Hypokalemia  K+3.4  -replete per protocol    Hypomagnesemia  Mag 1.5  -replete per protocol     Anemia  Hgb 7.2, stable    Hx DVT  RUE DVT  -xarelto 10mg at home, increased to 20mg for therapeutic dosing for AF, refusing due to wound bleeding      Plan:  -Start IV lopressor 5mg Q6hr PRN for HR sustained >120, give IV dose now  -Continue PO lopressor 25mg BID, she expressed desire to actively control HR as a comfort measure as she transitions to hospice  -Continue diltiazem 60mg Q6hrs, xarelto 10mg  -Give one time dose IV lasix 20mg now  -Further recs per primary/hospice/palliative  -Long discussion regarding plan of care, she is comfortable and accepting of her current plan at this time      Plan of care discussed with patient, RN.        Charo Yusuf, APRN  9/4/2024  11:24 AM  [phone] [place]  [phone] [place]

## 2024-09-04 NOTE — PROGRESS NOTES
[facility] Pulmonary Progress Note    Yesenia Daniels Patient Status:  Inpatient    1975 MRN [id number]   [facility] 3NE-A Attending Monica Loyola MD   Hosp Day # 4 PCP Eduardo Brooks MD     Subjective/ Interval history:  No acute events overnight, she feels the same today. No worse. Remains on 1-2L o2.  No fevers    Objective:  /78   Pulse 107   Temp 99.8 °F (37.7 °C) (Oral)   Resp 19   Ht 5' 8\" (1.727 m)   Wt (!) 315 lb (142.9 kg)   SpO2 92%   BMI 47.90 kg/m²       Temp (24hrs), Av.9 °F (36.6 °C), Min:97 °F (36.1 °C), Max:99.8 °F (37.7 °C)      Intake/Output:    Intake/Output Summary (Last 24 hours) at 2024 0659  Last data filed at 9/3/2024 2150  Gross per 24 hour   Intake 360 ml   Output --   Net 360 ml       Physical Exam:   General: alert, cooperative, oriented.  No respiratory distress.   Head: Normocephalic, without obvious abnormality, atraumatic.   Lungs: clear to auscultation bilaterally   Heart: regular rate and rhythm, S1, S2 normal, no murmur, click, rub or gallop   Extremity: No edema or cyanosis   Neurological: Alert, interactive, no focal deficits  Lab Data Review:     Recent Results (from the past 72 hour(s))   CBC, Platelet; No Differential    Collection Time: 24  5:28 AM   Result Value Ref Range    WBC 4.8 4.0 - 11.0 x10(3) uL    RBC 2.30 (L) 3.80 - 5.30 x10(6)uL    HGB 7.3 (L) 12.0 - 16.0 g/dL    HCT 23.3 (L) 35.0 - 48.0 %    .0 150.0 - 450.0 10(3)uL    .3 (H) 80.0 - 100.0 fL    MCH 31.7 26.0 - 34.0 pg    MCHC 31.3 31.0 - 37.0 g/dL    RDW 20.5 %   Basic Metabolic Panel (8)    Collection Time: 24  5:28 AM   Result Value Ref Range    Glucose 91 70 - 99 mg/dL    Sodium 139 136 - 145 mmol/L    Potassium 3.5 3.5 - 5.1 mmol/L    Chloride 107 98 - 112 mmol/L    CO2 26.0 21.0 - 32.0 mmol/L    Anion Gap 6 0 - 18 mmol/L    BUN 7 (L) 9 - 23 mg/dL    Creatinine 0.50 (L) 0.55 - 1.02 mg/dL    Calcium, Total 8.5 (L) 8.7 - 10.4 mg/dL    Calculated  Osmolality 286 275 - 295 mOsm/kg    eGFR-Cr 115 >=60 mL/min/1.73m2   Open heart surgical profile    Collection Time: 09/03/24  5:29 AM   Result Value Ref Range    Glucose 89 70 - 99 mg/dL    Sodium 138 136 - 145 mmol/L    Potassium 3.6 3.5 - 5.1 mmol/L    Chloride 106 98 - 112 mmol/L    CO2 27.0 21.0 - 32.0 mmol/L    BUN 8 (L) 9 - 23 mg/dL    Creatinine 0.48 (L) 0.55 - 1.02 mg/dL    Calcium, Total 8.4 (L) 8.7 - 10.4 mg/dL    eGFR-Cr 116 >=60 mL/min/1.73m2    Magnesium 1.7 1.6 - 2.6 mg/dL   CBC With Differential With Platelet    Collection Time: 09/03/24 11:17 AM   Result Value Ref Range    WBC 5.9 4.0 - 11.0 x10(3) uL    RBC 2.19 (L) 3.80 - 5.30 x10(6)uL    HGB 7.0 (L) 12.0 - 16.0 g/dL    HCT 22.2 (L) 35.0 - 48.0 %    .0 150.0 - 450.0 10(3)uL    .4 (H) 80.0 - 100.0 fL    MCH 32.0 26.0 - 34.0 pg    MCHC 31.5 31.0 - 37.0 g/dL    RDW 20.1 %    Neutrophil Absolute Prelim 4.46 1.50 - 7.70 x10 (3) uL   Manual differential    Collection Time: 09/03/24 11:17 AM   Result Value Ref Range    Neutrophil Absolute Manual 5.55 1.50 - 7.70 x10(3) uL    Lymphocyte Absolute Manual 0.18 (L) 1.00 - 4.00 x10(3) uL    Monocyte Absolute Manual 0.18 0.10 - 1.00 x10(3) uL    Eosinophil Absolute Manual 0.00 0.00 - 0.70 x10(3) uL    Basophil Absolute Manual 0.00 0.00 - 0.20 x10(3) uL    Neutrophils % Manual 94 %    Lymphocyte % Manual 3 %    Monocyte % Manual 3 %    Eosinophil % Manual 0 %    Basophil % Manual 0 %    NRBC 1 (H) 0    Total Cells Counted 100     RBC Morphology Normal Normal, Slide reviewed, see previous RBC morphology.    Platelet Morphology Normal Normal   Prothrombin Time (PT)    Collection Time: 09/03/24  4:54 PM   Result Value Ref Range    PT 16.2 (H) 11.6 - 14.8 seconds    INR 1.30 (H) 0.80 - 1.20   Magnesium    Collection Time: 09/04/24  5:23 AM   Result Value Ref Range    Magnesium 1.5 (L) 1.6 - 2.6 mg/dL   Potassium    Collection Time: 09/04/24  5:23 AM   Result Value Ref Range    Potassium 3.4 (L) 3.5 -  5.1 mmol/L   Basic Metabolic Panel (8)    Collection Time: 09/04/24  5:23 AM   Result Value Ref Range    Glucose 97 70 - 99 mg/dL    Sodium 136 136 - 145 mmol/L    Potassium 3.4 (L) 3.5 - 5.1 mmol/L    Chloride 105 98 - 112 mmol/L    CO2 24.0 21.0 - 32.0 mmol/L    Anion Gap 7 0 - 18 mmol/L    BUN 8 (L) 9 - 23 mg/dL    Creatinine 0.46 (L) 0.55 - 1.02 mg/dL    Calcium, Total 8.3 (L) 8.7 - 10.4 mg/dL    Calculated Osmolality 280 275 - 295 mOsm/kg    eGFR-Cr 117 >=60 mL/min/1.73m2   CBC, Platelet; No Differential    Collection Time: 09/04/24  5:23 AM   Result Value Ref Range    WBC 5.6 4.0 - 11.0 x10(3) uL    RBC 2.26 (L) 3.80 - 5.30 x10(6)uL    HGB 7.2 (L) 12.0 - 16.0 g/dL    HCT 22.6 (L) 35.0 - 48.0 %    .0 150.0 - 450.0 10(3)uL    .0 80.0 - 100.0 fL    MCH 31.9 26.0 - 34.0 pg    MCHC 31.9 31.0 - 37.0 g/dL    RDW 19.9 %       Recent Labs   Lab 09/03/24  1654   PTP 16.2*   INR 1.30*       Cultures:   Hospital Encounter on 08/31/24   1. Blood Culture     Status: None (Preliminary result)    Collection Time: 08/31/24  4:12 AM    Specimen: Blood,peripheral   Result Value Ref Range    Blood Culture Result No Growth 3 Days N/A       Radiology:  CTA CHEST (CPT=71275)  Narrative: PROCEDURE:  CT ANGIOGRAPHY, CHEST (CPT=71275)     COMPARISON:  EDALAN , XR, XR CHEST AP PORTABLE  (CPT=71045), 8/31/2024, 3:53 AM.  JUAN C , CT, CT CHEST (CPT=71250), 2/17/2023, 6:32 PM.     INDICATIONS:  cancer patient, chest palpitations, and shortness of breath     TECHNIQUE:  IV contrast-enhanced multislice CT angiography is performed through the pulmonary arterial anatomy. 3D volume renderings are generated.  Dose reduction techniques were used. Dose information is transmitted to the ACR (American College of   Radiology) NRDR (National Radiology Data Registry) which includes the Dose Index Registry.     PATIENT STATED HISTORY:(As transcribed by Technologist)  Heart palpitations, dyspnea. History of breast Ca.      CONTRAST USED:   100cc of Isovue 370     FINDINGS:    VASCULATURE:  No pulmonary embolus.  THORACIC AORTA:  No aneurysm or visible dissection.    LUNGS:  There is a soft tissue mass in the right hilum extending into the right lower lobe and right middle lobe with collapse of the right middle lobe.  Multiple masses are noted in the right lower lobe the largest measures 3 cm.  This mass is difficult   to measure due to the extension into the hilum.  There is volume loss on the right.  There are nodular interlobular septa asymmetric right lung versus left consistent with lymphangitic spread of metastases.  There is peribronchial thickening.  There is   some ground-glass opacity in the left upper lobe consistent with pneumonitis.  There are some ground-glass opacities noted in the lower lobes adjacent to the lung masses in the RLL.  Marked narrowing of the bronchi to the RML and RLL centrally related to   the mass.  MEDIASTINUM:  Extensive mediastinal lymphadenopathy within the right left paratracheal spaces, prevascular space, subcarinal lymph node.  MELIA:  Bilateral hilar lymphadenopathy the largest lymph nodes are on the right measuring 2.5 x 2 cm.  CARDIAC:  No enlargement, pericardial thickening, or significant coronary artery calcification.  PLEURA:  Small right pleural effusion.  CHEST WALL:  Right-sided Port-A-Cath tip in SVC.  Marked nodular skin thickening involving the entire left chest wall and breast marked thickening up to 1.5 cm.  Multiple cysts nodules are noted within left breast extending to the mediastinum.  Left   axillary soft tissue mass measures up to 3.2 cm.  There are supraclavicular lymph nodes and lymph nodes noted measuring up to 2.5 cm.  2.5 cm right axillary enlarged lymph node also noted.  There are numerous smaller axillary lymph nodes seen on the   left.  LIMITED ABDOMEN:  There is a 3.8 x 3.5 cm mass in the gastrosplenic ligament.  BONES:  Mild to moderate multiple level degenerative disc  disease.  OTHER:  Negative.                     Impression: CONCLUSION:    1. No pulmonary embolus or aortic dissection.  2. Extensive tumor extending to involve the left breast and less skin.  There is extensive lymphadenopathy within the hilar regions, supraclavicular region, mediastinum, hilum.  There is masslike lesion in the central aspect of the right lung extending   into the right lower lobe with complete atelectasis of the right middle lobe underlying tumor not excluded in the right middle lobe consolidation/collapse.  There is a soft tissue mass noted in the gastrohepatic ligament noted.  Findings are consistent   with metastatic disease given the history.           Preliminary interpretation was performed by [facility]. Final report agrees with preliminary interpretation without discrepancy.                LOCATION:  [place] [address]        Dictated by (CST): Manuelito Reid MD on 8/31/2024 at 12:20 PM       Finalized by (CST): Manuelito Reid MD on 8/31/2024 at 12:27 PM     XR CHEST AP PORTABLE  (CPT=71045)  Narrative: PROCEDURE:  XR CHEST AP PORTABLE  (CPT=71045)     TECHNIQUE:  AP chest radiograph was obtained.     COMPARISON:  None.     INDICATIONS:  cancer patient, chest palpitations, and shortness of breath     PATIENT STATED HISTORY: (As transcribed by Technologist)   cancer patient, chest palpitations, and shortness of breath.         FINDINGS:  Moderate to large region of consolidation in the right lung base medially.  This may be related to acute infection versus underlying pulmonary in mass.  There is ground-glass opacity left mid lung.     Port-A-Cath tip SVC.  No pneumothorax.     Mild blunting both costophrenic angles.     Heart size upper limits of normal.                   Impression: CONCLUSION:  Masslike consolidation in the right lung base medially.     Bilateral pulmonary infiltrates/atelectasis.        LOCATION:  [place]                 Dictated by (CST): Sasha Caballero MD on  8/31/2024 at 7:21 AM       Finalized by (CST): Sasha Caballero MD on 8/31/2024 at 7:22 AM         Medications reviewed     Assessment:  Shortness of breath, likely multifactorial in setting of progressive breast cancer and right middle lobe consolidation  Right middle lobe consolidation, concern for postobstructive pneumonia versus malignancy  Multiple masses noted in the right lower lobe with the largest being 3 cm with known malignancy  Mediastinal adenopathy  Abdominal mass, 3.8 x 3.5 cm mass in gastrosplenic  History of left breast tumor with infiltrating ductal carcinoma  History of DVT, currently on Xarelto  History of asthma  Obesity    Plan:  Plan on bronchoscopy today as detailed yesterday  Continue to hold rivaroxaban in interim - likely resume tomorrow    Sixto Lockhart MD

## 2024-09-04 NOTE — CM/SW NOTE
KIRA received consult for hospice stating [facility]. KIRA sent referral to [facility] in [place] .    KIRA called [facility] ([phone]) and spoke with Rajni. Rajni stated a representative from [facility] will be able to meet with pt at bedside today and [facility] will reach out to this writer with a meeting time. Updated treatment team.     Addendum (12:15pm) - KIRA received message from Addison at [facility] stating a Hospice RN will meet with pt/family between 1-2pm today. Updated RN.     Addendum (2:45pm) - KIRA spoke with [facility] RN Mya and  RN Socorro. Mya stated pt has signed consents with [facility] and plan is to discharge home tomorrow 9/5/24. Mya stated DME will be delivered to pt's residence and pt's spouse will bring a portable oxygen tank to transport pt home. Mya stated pt does not want ambulance transport. Mya requested pt discharge tomorrow at 12pm and hospice RN will admit pt at 3pm at home. RN aware.     Pt will discharge home tomorrow 9/5/24 at 12pm with [facility]. KIRA will continue to follow.     JOANNA Mccall  Discharge Planner

## 2024-09-04 NOTE — PLAN OF CARE
Assumed patient care at 1930.  AxOx4  2L nasal cannula  Afib on tele  VSS  Denies nausea  NPO at MN for bronchoscopy  Consent signed  PRN norco given for pain  R chest port saline locked  Unit draw  Bed in lowest position  Call light within reach  Family at bedside  Updated on POC   Needs met at this time    Problem: CARDIOVASCULAR - ADULT  Goal: Maintains optimal cardiac output and hemodynamic stability  Description: INTERVENTIONS:  - Monitor vital signs, rhythm, and trends  - Monitor for bleeding, hypotension and signs of decreased cardiac output  - Evaluate effectiveness of vasoactive medications to optimize hemodynamic stability  - Monitor arterial and/or venous puncture sites for bleeding and/or hematoma  - Assess quality of pulses, skin color and temperature  - Assess for signs of decreased coronary artery perfusion - ex. Angina  - Evaluate fluid balance, assess for edema, trend weights  9/4/2024 0721 by Mey Basurto RN  Outcome: Progressing  9/4/2024 0721 by Mey Basurto RN  Outcome: Progressing  Goal: Absence of cardiac arrhythmias or at baseline  Description: INTERVENTIONS:  - Continuous cardiac monitoring, monitor vital signs, obtain 12 lead EKG if indicated  - Evaluate effectiveness of antiarrhythmic and heart rate control medications as ordered  - Initiate emergency measures for life threatening arrhythmias  - Monitor electrolytes and administer replacement therapy as ordered  9/4/2024 0721 by Mey Basurto RN  Outcome: Progressing  9/4/2024 0721 by Mey Basurto RN  Outcome: Progressing     Problem: PAIN - ADULT  Goal: Verbalizes/displays adequate comfort level or patient's stated pain goal  Description: INTERVENTIONS:  - Encourage pt to monitor pain and request assistance  - Assess pain using appropriate pain scale  - Administer analgesics based on type and severity of pain and evaluate response  - Implement non-pharmacological measures as appropriate and evaluate response  - Consider cultural  and social influences on pain and pain management  - Manage/alleviate anxiety  - Utilize distraction and/or relaxation techniques  - Monitor for opioid side effects  - Notify MD/LIP if interventions unsuccessful or patient reports new pain  - Anticipate increased pain with activity and pre-medicate as appropriate  9/4/2024 0721 by Mey Basurto, RN  Outcome: Progressing  9/4/2024 0721 by Mey Basurto, RN  Outcome: Progressing

## 2024-09-04 NOTE — ANESTHESIA PROCEDURE NOTES
Airway  Date/Time: 9/4/2024 7:20 AM  Urgency: elective    Airway not difficult    General Information and Staff    Patient location during procedure: endo  Anesthesiologist: Jimi Norman MD  Performed: anesthesiologist   Performed by: Jimi Norman MD  Authorized by: Jimi Norman MD      Indications and Patient Condition  Indications for airway management: anesthesia  Preoxygenated: yes  Mask difficulty assessment: 1 - vent by mask    Final Airway Details  Final airway type: endotracheal airway      Successful airway: ETT  Cuffed: yes   Successful intubation technique: direct laryngoscopy  Endotracheal tube insertion site: oral  Blade: Liang  Blade size: #3  ETT size (mm): 8.5    Cormack-Lehane Classification: grade I - full view of glottis  Placement verified by: capnometry   Cuff volume (mL): 0  Measured from: lips  ETT to lips (cm): 19  Number of attempts at approach: 1  Number of other approaches attempted: 0

## 2024-09-04 NOTE — ANESTHESIA POSTPROCEDURE EVALUATION
[facility]    Yesenia HODGES Jose Alfredo Ilya SEAN Patient Status:  Inpatient   Age/Gender 49 year old female MRN [id number]   Location [facility] POST ANESTHESIA CARE UNIT Attending Valentina Green DO   Hosp Day # 4 PCP Eduardo Brooks MD       Anesthesia Post-op Note    ENDOBRONCHIAL ULTRASOUND (EBUS) with TBNA with endobronchial biopsies    Procedure Summary       Date: 09/04/24 Room / Location:  ENDOSCOPY 04 /  ENDOSCOPY    Anesthesia Start: 0715 Anesthesia Stop:     Procedure: ENDOBRONCHIAL ULTRASOUND (EBUS) with TBNA with endobronchial biopsies Diagnosis: (lymphadenopathy)    Surgeons: Sixto Lockhart MD Anesthesiologist: Jimi Norman MD    Anesthesia Type: general ASA Status: 3            Anesthesia Type: general    Vitals Value Taken Time   /51 09/04/24 0819   Temp 99.1 °F (37.3 °C) 09/04/24 0819   Pulse 101 09/04/24 0819   Resp 18 09/04/24 0819   SpO2 96 % 09/04/24 0819       Patient Location: PACU    Anesthesia Type: general    Airway Patency: patent    Postop Pain Control: adequate    Mental Status: mildly sedated but able to meaningfully participate in the post-anesthesia evaluation    Nausea/Vomiting: none    Cardiopulmonary/Hydration status: stable euvolemic    Complications: no apparent anesthesia related complications    Postop vital signs: stable    Dental Exam: Unchanged from Preop    Patient to be discharged from PACU when criteria met.

## 2024-09-05 VITALS
TEMPERATURE: 98 F | HEART RATE: 69 BPM | HEIGHT: 68 IN | WEIGHT: 293 LBS | SYSTOLIC BLOOD PRESSURE: 113 MMHG | OXYGEN SATURATION: 95 % | BODY MASS INDEX: 44.41 KG/M2 | DIASTOLIC BLOOD PRESSURE: 62 MMHG | RESPIRATION RATE: 18 BRPM

## 2024-09-05 LAB
ATRIAL RATE: 75 BPM
MAGNESIUM SERPL-MCNC: 1.9 MG/DL (ref 1.6–2.6)
P AXIS: 45 DEGREES
P-R INTERVAL: 188 MS
POTASSIUM SERPL-SCNC: 4.1 MMOL/L (ref 3.5–5.1)
Q-T INTERVAL: 378 MS
QRS DURATION: 102 MS
QTC CALCULATION (BEZET): 422 MS
R AXIS: 76 DEGREES
T AXIS: 39 DEGREES
VENTRICULAR RATE: 75 BPM

## 2024-09-05 PROCEDURE — 99232 SBSQ HOSP IP/OBS MODERATE 35: CPT | Performed by: INTERNAL MEDICINE

## 2024-09-05 PROCEDURE — 99239 HOSP IP/OBS DSCHRG MGMT >30: CPT | Performed by: INTERNAL MEDICINE

## 2024-09-05 PROCEDURE — 99233 SBSQ HOSP IP/OBS HIGH 50: CPT | Performed by: STUDENT IN AN ORGANIZED HEALTH CARE EDUCATION/TRAINING PROGRAM

## 2024-09-05 RX ORDER — CEFUROXIME AXETIL 500 MG/1
500 TABLET ORAL EVERY 12 HOURS SCHEDULED
Status: DISCONTINUED | OUTPATIENT
Start: 2024-09-05 | End: 2024-09-05

## 2024-09-05 RX ORDER — SENNA AND DOCUSATE SODIUM 50; 8.6 MG/1; MG/1
2 TABLET, FILM COATED ORAL DAILY
Qty: 60 TABLET | Refills: 0 | Status: SHIPPED | OUTPATIENT
Start: 2024-09-06

## 2024-09-05 RX ORDER — CEFUROXIME AXETIL 500 MG/1
500 TABLET ORAL EVERY 12 HOURS SCHEDULED
Qty: 10 TABLET | Refills: 0 | Status: SHIPPED | OUTPATIENT
Start: 2024-09-05 | End: 2024-09-10

## 2024-09-05 RX ORDER — DILTIAZEM HYDROCHLORIDE 120 MG/1
240 CAPSULE, EXTENDED RELEASE ORAL DAILY
Status: DISCONTINUED | OUTPATIENT
Start: 2024-09-05 | End: 2024-09-05

## 2024-09-05 RX ORDER — METOPROLOL TARTRATE 25 MG/1
25 TABLET, FILM COATED ORAL
Qty: 60 TABLET | Refills: 3 | Status: SHIPPED | OUTPATIENT
Start: 2024-09-05

## 2024-09-05 NOTE — PROGRESS NOTES
Progress Note  Yesenia Daniels Patient Status:  Inpatient    1975 MRN [id number]   Location [facility] 3NE-A Attending Valentina Green, DO   Hosp Day # 5 PCP Eduardo Brooks MD     Subjective:  No acute events overnight.  Denies any cardiac symptoms currently,breathing feels stable.  Sustaining SR post DCCV 24.    Objective:  /47 (BP Location: Right arm)   Pulse 69   Temp 97.2 °F (36.2 °C) (Oral)   Resp 17   Ht 5' 8\" (1.727 m)   Wt (!) 315 lb (142.9 kg)   SpO2 95%   BMI 47.90 kg/m²     Telemetry: SR, HR 70s      Intake/Output:  No intake or output data in the 24 hours ending 24 0908    Last 3 Weights   24 0823 (!) 315 lb (142.9 kg)   24 0332 (!) 315 lb (142.9 kg)   24 1006 285 lb (129.3 kg)   23 1657 286 lb (129.7 kg)       Labs:  Recent Labs   Lab 24  0528 24  0529 24  0523 24  0715   GLU 91 89 97  --    BUN 7* 8* 8*  --    CREATSERUM 0.50* 0.48* 0.46*  --    EGFRCR 115 116 117  --    CA 8.5* 8.4* 8.3*  --     138 136  --    K 3.5 3.6 3.4*  3.4* 4.1    106 105  --    CO2 26.0 27.0 24.0  --      Recent Labs   Lab 24  0352 24  0506 24  0528 24  1117 24  0523   RBC 2.65* 2.55* 2.30* 2.19* 2.26*   HGB 8.3* 8.1* 7.3* 7.0* 7.2*   HCT 26.2* 26.2* 23.3* 22.2* 22.6*   MCV 98.9 102.7* 101.3* 101.4* 100.0   MCH 31.3 31.8 31.7 32.0 31.9   MCHC 31.7 30.9* 31.3 31.5 31.9   RDW 21.3 21.0 20.5 20.1 19.9   NEPRELIM 2.88 3.38  --  4.46  --    WBC 4.3 4.8 4.8 5.9 5.6   .0 436.0 410.0 403.0 397.0         Recent Labs   Lab 24  0352 24  0529   TROPHS 12 12       Review of Systems   Constitutional: Negative.   Cardiovascular:  Positive for dyspnea on exertion.   Respiratory: Negative.         Physical Exam:    Gen: alert, oriented x 3, NAD  Heent: pupils equal, reactive. Mucous membranes moist.   Neck: no jvd  Cardiac: regular rate and rhythm, normal S1,S2,no murmur, gallop or rub.  Lungs:  CTA,diminished at the basis   Abd: soft, NT/ND +bs  Ext: trace of lower extremities edema  Skin: Warm, dry,pale   Neuro: No focal deficits        Medications:     rivaroxaban  10 mg Oral Daily with dinner    senna-docusate  2 tablet Oral Daily    cefTRIAXone  2 g Intravenous Daily    dilTIAZem  60 mg Oral 4 times per day    metoprolol tartrate  25 mg Oral 2x Daily(Beta Blocker)    Levalbuterol Tartrate  2 puff Inhalation q6h    digoxin  250 mcg Intravenous Once    famotidine  20 mg Oral BID    fluticasone furoate  1 puff Inhalation Daily    potassium chloride  40 mEq Oral Once    fentaNYL  1 patch Transdermal Q72H    loratadine  10 mg Oral BID      sodium chloride       Assessment:  Paroxysmal atrial fibrillation, RVR s/p successful cardioversion 9/4/24  On lopressor 25 mg bid, diltiazem 60 mg 4 x daily    S/P x 2 doses of digoxin 9/1/24   Hx of prior DCCV 2020  [facility] Echo 10/2023 LVEF 60%, mod increased LA size  [id number] 2 (female, HTN): on Xarelto 10 mg daily-patient refusing to take full dose d/t prior hx of increased bleeding from the wound side   Suspected PNA  On abx  Pulm following, s/p bronchoscopy/EBUS with mechanical debridement 9/4/24 -suspect malignant airway obstruction    Asthma  Albuterol prn   Stage IV Metastatic Breast Cancer w/cutaneous involvement to chest  Chronic Anemia - Hgb 7.2-stable   Hx RUE DVT - historically on xarelto    Plan:  POD #1 s/p successful DCCV-currently sustaining in SR.  Continue lopressor,diltiazem.  Continue xarelto.  Plan for hospice meeting today and transition to home with hospice care.  Will schedule op follow up with Dr Camacho in 1 month.     Plan of care discussed with patient, RN.    Sumaya Du, APRN  9/5/2024  9:08 AM  [phone]

## 2024-09-05 NOTE — PROGRESS NOTES
Assumed care around 0700. A/Ox4. 3LNC. Going home on home 02. Port deaccessed by night shift. Up ad chan. Discharge pending.

## 2024-09-05 NOTE — PROGRESS NOTES
[facility] Pulmonary Progress Note    Yesenia Daniels Patient Status:  Inpatient    1975 MRN [id number]   Location [facility] 3NE-A Attending Monica Loyola MD   Hosp Day # 5 PCP Eduardo Brooks MD     Subjective:  Yesenia Daniels is a(n) 49 year old female who is admitted for shortness of breath respiratory failure.    Overnight: No acute events overnight, ready to go home    Objective:  /62 (BP Location: Right arm)   Pulse 69   Temp 98.1 °F (36.7 °C) (Oral)   Resp 18   Ht 5' 8\" (1.727 m)   Wt (!) 315 lb (142.9 kg)   SpO2 95%   BMI 47.90 kg/m²       Temp (24hrs), Av.7 °F (36.5 °C), Min:97.2 °F (36.2 °C), Max:98.1 °F (36.7 °C)      Intake/Output:  No intake or output data in the 24 hours ending 24 1410      Physical Exam:   General: alert, cooperative, oriented.  No respiratory distress.   Head: Normocephalic, without obvious abnormality, atraumatic.   Throat: Lips, mucosa, and tongue normal.  No thrush noted.   Neck: trachea midline, no adenopathy, no thyromegaly. No JVD.   Lungs: clear to auscultation bilaterally   Chest wall: No tenderness or deformity.   Heart: regular rate and rhythm, S1, S2 normal, no murmur, click, rub or gallop   Abdomen: soft, non-distended, no masses, no guarding, no     Rebound.   Extremity: No edema or cyanosis   Skin: No rashes or lesions.   Neurological: Alert, interactive, no focal deficits    Lab Data Review:  Recent Labs     24  0352 24  0506 24  0528   WBC 4.3 4.8 4.8   HGB 8.3* 8.1* 7.3*   .0 436.0 410.0     Recent Labs     24  0352 24  1610 24  0506 24  0528     --  141 139   K 3.6 3.8 3.9  3.9 3.5     --  107 107   CO2 24.0  --  24.0 26.0   BUN 14  --  11 7*   CREATSERUM 0.56  --  0.60 0.50*     Recent Labs   Lab 24  1654   PTP 16.2*   INR 1.30*       Cultures:   Hospital Encounter on 24   1. Blood Culture     Status: None    Collection Time: 24  4:12 AM    Specimen:  Blood,peripheral   Result Value Ref Range    Blood Culture Result No Growth 5 Days N/A       Radiology:  CATH CARDIOVERSION  Damián Cordova MD     9/4/2024  5:04 PM     Electrophysiology Procedure Note    Yesenia HODGES Jose Alfredo Ilya ESTRADA Location: Cath Lab   [facility] [id number] MRN [id number]   Admission Date 8/31/2024  Operation Date 09/04/24    Attending Physician Damián Cordova MD Operating Physician   Damián Cordova MD     Pre-Operative Diagnosis:Atrial Fibrillation    Post-Operative Diagnosis: Same as above  PROCEDURE(S) PERFORMED:    1.    Cardioversion.  2.     Sedation      :  Damián Cordova MD     ANESTHESIA:  IV sedation.per Anesthesia  INDICATION:  Persistent Atrial Fibrillation     COMPLICATIONS:  None.     METHODS:  The patient was brought to the outpatient cardiac   telemetry unit in a fasting and nonsedated state after providing   informed consent.  IV sedation was administered during continuous   ECG, pulse oximeter and noninvasive hemodynamic monitoring.    After administering IV Brevital in intermittent boluses, the   desired level of sedation was achieved.      Cardioversion Energy:  360J    Pad Position: [place]  Pre- Cardioversion Rhythm- Af  Post Cardioversion Rhythm - NSR    CONCLUSIONS:  1.    Successful cardioversion       Plan:  1- Rhythm/Rate Control Meds:  no changes  2) Anticoagulation- no changes  3) Follow Up- 1 month                    Damián Cordova MD     Cardiac Electrophysiololgy  [facility]           Medications reviewed     Assessment and Plan:   Patient Active Problem List   Diagnosis    Atrial fibrillation with rapid ventricular response (HCC)    Epigastric abdominal pain    Hyponatremia    Leukocytosis    Chest pressure    Acute deep vein thrombosis (DVT) of axillary vein of right upper extremity (HCC)    Acute deep vein thrombosis (DVT) of brachial vein of right upper extremity (HCC)    Open wound of left breast    Metastasis from breast cancer (HCC)     Sloughing of wound    Allergic reaction    Localized edema    Atrial fibrillation with RVR (HCC)    Community acquired pneumonia, unspecified laterality    Anemia, unspecified type    History of breast cancer    Anatomic airway obstruction    Thoracic lymphadenopathy    Palliative care by specialist    Goals of care, counseling/discussion    Shortness of breath    Cancer related pain       Assessment:  Shortness of breath, likely multifactorial in setting of progressive breast cancer and right middle lobe consolidation  Right middle lobe consolidation, concern for postobstructive pneumonia versus malignancy  Multiple masses noted in the right lower lobe with the largest being 3 cm with known malignancy  Mediastinal adenopathy  Abdominal mass, 3.8 x 3.5 cm mass in gastrosplenic  History of left breast tumor with infiltrating ductal carcinoma  History of DVT, currently on Xarelto  History of asthma  Obesity    Plan:  Completed abx  Will follow up bronchoscopy cultures  Cardiology following for A-fib  Going home with comfort care  We discussed how to use portable oxygen device  Ok for dc will sign off        Milad Ratliff MD  [facility]  Office: [phone]

## 2024-09-05 NOTE — DISCHARGE SUMMARY
[facility]     Yesenia Daniels Patient Status:  Inpatient    1975 MRN [id number]   Location [facility] 3NE-A Attending Valentina Green, DO   Hosp Day # 5 PCP Eduardo Brooks MD     Date of Admission: 2024  Date of Discharge:   2024    Discharge Disposition: Home or Self Care    Discharge Diagnosis:  #Hypoxia 2/2 RML malignant airway obstruction  -s/p Bronch/EBUS with mechanical debridement  -Await biopsy results   -no obvious PNA evidence per bronch report, Complete Empiric course 5 day course of Ceftriaxone      #Persistent Afib   -s/p successful cardioversion   -Cardizem/Metoprolol/digoxin  -Xarelto      #Metastatic Breast Cancer  -Pain control - continue fentanyl patch, PRN Norco   -Palliative care on consult, patient opting to discharge with Hospice transition      #Chronic Anemia due to chemo, malignancy - transfuse for Hb <7  #h/o RUE DVT    History of Present Illness:      Yesenia Daniels is a 49 year old female with history of metastatic breast cancer to lungs and brain, atrial fibrillation, anemia presents emergency room with shortness of breath that started 3 days ago.  No fevers, chills, nausea, vomiting, diarrhea or constipation.  No chest pain but did have some palpitations.  No dizziness, lightheadedness, or syncope.  No numbness or ting of extremities or any focalized weakness.    Brief Synopsis: Patient was admitted, managed for atrial fibrillation with diltiazem drip and initially treated for presumed postobstructive pneumonia, taken for bronchoscopy where she was found to have a malignant obstruction which was intervened on, symptomatically improved following procedure but remained in atrial fibrillation, went for cardioversion which was successful, met with palliative care and ultimately decided upon discharge with hospice, patient was discharged home in stable condition.    Lace+ Score: 73  59-90 High Risk  29-58 Medium Risk  0-28   Low Risk        TCM Follow-Up Recommendation:  LACE > 58: High Risk of readmission after discharge from the hospital.      Procedures during hospitalization:   Bronch/EBUS, Cardioversion    Incidental or significant findings and recommendations (brief descriptions):  See above    Lab/Test results pending at Discharge:   Bronch path    Consultants:  Pulmonology, Cardiology, Palliative care     Discharge Medication List:     Discharge Medications        START taking these medications        Instructions Prescription details   cefuroxime 500 MG Tabs  Commonly known as: Ceftin      Take 1 tablet (500 mg total) by mouth every 12 (twelve) hours for 5 days.   Stop taking on: September 10, 2024  Quantity: 10 tablet  Refills: 0     metoprolol tartrate 25 MG Tabs  Commonly known as: Lopressor      Take 1 tablet (25 mg total) by mouth 2x Daily(Beta Blocker).   Quantity: 60 tablet  Refills: 3     senna-docusate 8.6-50 MG Tabs  Commonly known as: Senokot-S  Start taking on: September 6, 2024      Take 2 tablets by mouth daily.   Quantity: 60 tablet  Refills: 0            CHANGE how you take these medications        Instructions Prescription details   dilTIAZem  MG Cp24  Commonly known as: CardIZEM CD  What changed: when to take this      Take 1 capsule (240 mg total) by mouth daily.   Quantity: 30 capsule  Refills: 1            CONTINUE taking these medications        Instructions Prescription details   Betamethasone Dipropionate Aug 0.05 % Crea  Commonly known as: Diprolene AF      Apply 1 Application topically 2 (two) times daily.   Quantity: 50 g  Refills: 1     famotidine 20 MG Tabs  Commonly known as: Pepcid      Take 1 tablet (20 mg total) by mouth 2 (two) times daily.   Refills: 0     fentaNYL 50 MCG/HR Pt72  Commonly known as: Duragesic      Place 1 patch onto the skin every third day.   Stop taking on: September 22, 2024  Refills: 0     fluticasone propionate 110 MCG/ACT Aero  Commonly known as: Flovent HFA      Inhale 2  puffs into the lungs every 4 (four) hours as needed (as needed).   Refills: 0     gabapentin 100 MG Caps  Commonly known as: Neurontin      Take 1 capsule (100 mg total) by mouth 3 (three) times daily.   Refills: 0     Levalbuterol Tartrate 45 MCG/ACT Aero  Commonly known as: XOPENEX HFA      Inhale 2 puffs into the lungs every 4 (four) hours as needed for Wheezing.   Refills: 0     loratadine 10 MG Tabs  Commonly known as: Claritin      Take 1 tablet (10 mg total) by mouth 2 (two) times daily.   Refills: 0     LORazepam 0.5 MG Tabs  Commonly known as: Ativan      Take 2 tablets (1 mg total) by mouth 2 (two) times daily as needed.   Refills: 0     oxyCODONE-acetaminophen 5-325 MG Tabs  Commonly known as: Percocet      Take 1-2 tablets by mouth every 6 (six) hours as needed for Pain.   Quantity: 20 tablet  Refills: 0     rivaroxaban 15 & 20 MG Tbpk      Take As Directed based on package instructions: Days 1-21: 15 mg by mouth twice daily Days 22-30: 20 mg by mouth once daily   Quantity: 1 each  Refills: 0     Xarelto 20 MG Tabs  Generic drug: rivaroxaban      Take 0.5 tablets (10 mg total) by mouth daily.   Refills: 0     sodium hypochlorite 0.125 % Soln  Commonly known as: Dakin's      Apply on affected areas daily.   Quantity: 1000 mL  Refills: 3     tiZANidine 4 MG Tabs  Commonly known as: Zanaflex      Take 1 tablet (4 mg total) by mouth 3 (three) times daily as needed (spasms).   Quantity: 20 tablet  Refills: 0            STOP taking these medications      apixaban 5 MG Tabs  Commonly known as: Eliquis        cetirizine 10 MG Tabs  Commonly known as: ZyrTEC        Ketorolac Tromethamine 10 MG Tabs  Commonly known as: TORADOL        omeprazole 20 MG Cpdr  Commonly known as: PriLOSEC                  Where to Get Your Medications        These medications were sent to [id number] - [place], [place] - [address] AT [facility] [address], [phone], [phone]  [address],  [facility] [address]      Phone: [phone]   cefuroxime 500 MG Tabs  metoprolol tartrate 25 MG Tabs  senna-docusate 8.6-50 MG Tabs         [place] reviewed: n/a    Follow-up appointment:   Vic Camacho MD  [address] [facility] [facility]  [address]  [facility] [id number] [phone]    Follow up in 1 month(s)  Office will call you for follow up appt    Eduardo Brooks MD  [address] [facility] [address] [phone]    Follow up  As needed    Appointments for Next 30 Days 2024 - 10/5/2024      None            Vital signs:  Temp:  [97.2 °F (36.2 °C)-98.1 °F (36.7 °C)] 98.1 °F (36.7 °C)  Pulse:  [69-96] 69  Resp:  [16-18] 18  BP: (101-133)/(39-76) 113/62  SpO2:  [94 %-97 %] 95 %    Physical Exam:    General: No acute distress   Lungs: clear to auscultation  Cardiovascular: S1, S2  Abdomen: Soft      -----------------------------------------------------------------------------------------------  PATIENT DISCHARGE INSTRUCTIONS: See electronic chart    Valentina Green DO    Total time spent on discharge plannin minutes     The  [employer] Cures Act makes medical notes like these available to patients in the interest of transparency. Please be advised this is a medical document. Medical documents are intended to carry relevant information, facts as evident, and the clinical opinion of the practitioner. The medical note is intended as peer to peer communication and may appear blunt or direct. It is written in medical language and may contain abbreviations or verbiage that are unfamiliar.

## 2024-09-05 NOTE — PROGRESS NOTES
NURSING DISCHARGE NOTE    Discharged Home via Wheelchair.  Accompanied by Spouse  Belongings Taken by patient/family.      Discharge paperwork provided and explained. All questions and concerns addressed. Scripts sent to pharmacy of choice. Patient going home on home hospice care. Left in care of family.

## 2024-09-05 NOTE — CM/SW NOTE
09/05/24 1000   Discharge disposition   Expected discharge disposition Hospice/Home   Additional Home Care/Hospice Provider   ([facility])   Discharge transportation Private car     Pt discussed in rounds with RN. Pt will discharge home today on [facility]. Per chart notes, [facility] RN Mya stated DME will be delivered to pt's residence and pt's spouse will transport pt home at 12pm. Per chart notes, [facility] will admit pt to hospice around 3pm at home.     [facility]  [phone]    JOANNA Mccall  Discharge Planner

## 2024-09-05 NOTE — PLAN OF CARE
Assumed care 1930  A&ox4  3L O2 NC  Meds In Mar given  Family at bedside   Removed HUB needle per pt request, refuses MD RENY notified   Zofran PO ordered   Continuing to meet pt needs  Safety precautions in place  Need further details reference flowsheets        Problem: CARDIOVASCULAR - ADULT  Goal: Maintains optimal cardiac output and hemodynamic stability  Description: INTERVENTIONS:  - Monitor vital signs, rhythm, and trends  - Monitor for bleeding, hypotension and signs of decreased cardiac output  - Evaluate effectiveness of vasoactive medications to optimize hemodynamic stability  - Monitor arterial and/or venous puncture sites for bleeding and/or hematoma  - Assess quality of pulses, skin color and temperature  - Assess for signs of decreased coronary artery perfusion - ex. Angina  - Evaluate fluid balance, assess for edema, trend weights  Outcome: Progressing  Goal: Absence of cardiac arrhythmias or at baseline  Description: INTERVENTIONS:  - Continuous cardiac monitoring, monitor vital signs, obtain 12 lead EKG if indicated  - Evaluate effectiveness of antiarrhythmic and heart rate control medications as ordered  - Initiate emergency measures for life threatening arrhythmias  - Monitor electrolytes and administer replacement therapy as ordered  Outcome: Progressing     Problem: PAIN - ADULT  Goal: Verbalizes/displays adequate comfort level or patient's stated pain goal  Description: INTERVENTIONS:  - Encourage pt to monitor pain and request assistance  - Assess pain using appropriate pain scale  - Administer analgesics based on type and severity of pain and evaluate response  - Implement non-pharmacological measures as appropriate and evaluate response  - Consider cultural and social influences on pain and pain management  - Manage/alleviate anxiety  - Utilize distraction and/or relaxation techniques  - Monitor for opioid side effects  - Notify MD/LIP if interventions unsuccessful or patient reports new  pain  - Anticipate increased pain with activity and pre-medicate as appropriate  Outcome: Progressing     Problem: RESPIRATORY - ADULT  Goal: Achieves optimal ventilation and oxygenation  Description: INTERVENTIONS:  - Assess for changes in respiratory status  - Assess for changes in mentation and behavior  - Position to facilitate oxygenation and minimize respiratory effort  - Oxygen supplementation based on oxygen saturation or ABGs  - Provide Smoking Cessation handout, if applicable  - Encourage broncho-pulmonary hygiene including cough, deep breathe, Incentive Spirometry  - Assess the need for suctioning and perform as needed  - Assess and instruct to report SOB or any respiratory difficulty  - Respiratory Therapy support as indicated  - Manage/alleviate anxiety  - Monitor for signs/symptoms of CO2 retention  Outcome: Progressing

## 2024-09-05 NOTE — PROGRESS NOTES
[facility]   part of Merged with [facility]     Hospitalist Progress Note     Yesenia Daniels Patient Status:  Inpatient    1975 MRN [id number]   Location [facility] 3NE-A Attending Monica Loyola MD   Hosp Day # 5 PCP Eduardo Brooks MD     Chief Complaint: Afib     Subjective:     Feels well, wants to go home     Objective:    Review of Systems:   A comprehensive review of systems was completed; pertinent positive and negatives stated in subjective.    Vital signs:  Temp:  [97.2 °F (36.2 °C)-98 °F (36.7 °C)] 97.2 °F (36.2 °C)  Pulse:  [] 69  Resp:  [16-23] 17  BP: (101-138)/() 101/47  SpO2:  [91 %-97 %] 95 %    Physical Exam:    General: No acute distress  Respiratory: No wheezes, no rhonchi- ausc anteriorly   Cardiovascular: S1, S2, NSR  Abdomen: Soft, Non-tender, non-distended, positive bowel sounds  Extremities: No edema      Diagnostic Data:    Labs:  Recent Labs   Lab 24  0352 24  0506 24  0528 24  1117 24  1654 24  0523   WBC 4.3 4.8 4.8 5.9  --  5.6   HGB 8.3* 8.1* 7.3* 7.0*  --  7.2*   MCV 98.9 102.7* 101.3* 101.4*  --  100.0   .0 436.0 410.0 403.0  --  397.0   INR  --   --   --   --  1.30*  --        Recent Labs   Lab 24  0352 24  1610 24  0528 24  0529 24  0523 24  0715   GLU 92   < > 91 89 97  --    BUN 14   < > 7* 8* 8*  --    CREATSERUM 0.56   < > 0.50* 0.48* 0.46*  --    CA 9.2   < > 8.5* 8.4* 8.3*  --    ALB 3.6  --   --   --   --   --       < > 139 138 136  --    K 3.6   < > 3.5 3.6 3.4*  3.4* 4.1      < > 107 106 105  --    CO2 24.0   < > 26.0 27.0 24.0  --    ALKPHO 64  --   --   --   --   --    AST 19  --   --   --   --   --    ALT 23  --   --   --   --   --    BILT 0.7  --   --   --   --   --    TP 5.9  --   --   --   --   --     < > = values in this interval not displayed.       Estimated Creatinine Clearance: 149.2 mL/min (A) (based on SCr of 0.46 mg/dL (L)).    Recent Labs   Lab  08/31/24  0352 08/31/24  0529   TROPHS 12 12       Recent Labs   Lab 09/03/24  1654   PTP 16.2*   INR 1.30*                  Microbiology    Hospital Encounter on 08/31/24   1. Blood Culture     Status: None (Preliminary result)    Collection Time: 08/31/24  4:12 AM    Specimen: Blood,peripheral   Result Value Ref Range    Blood Culture Result No Growth 4 Days N/A         Imaging: Reviewed in Epic.    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  -s/p Bronch/EBUS with mechanical debridement  -Await biopsy results   -no obvious PNA evidence per bronch report, Complete Empiric course 5 day course of Ceftriaxone     #Persistent Afib   -s/p successful cardioversion 9/4  -Cardizem/Metoprolol/digoxin  -Xarelto     #Metastatic Breast Cancer  -Pain control - continue fentanyl patch, PRN Norco   -Palliative care on consult, patient opting to discharge with Hospice transition     #Chronic Anemia due to chemo, malignancy - transfuse for Hb <7  #h/o RUE DVT    POC:  Discharge home with Hospice       Supplementary Documentation:     Quality:  DVT Mechanical Prophylaxis:        DVT Pharmacologic Prophylaxis   Medication    rivaroxaban (Xarelto) tab 10 mg    heparin (Porcine) 100 Units/mL lock flush 500 Units                Code Status: DNAR/Selective Treatment  Crawford: No urinary catheter in place  Crawford Duration (in days):   Central line:    JACQUIE: 8/31/2024    Discharge is dependent on: clinical   At this  point Ms. Daniels is expected to be discharge to: home    The 21st Century Cures Act makes medical notes like these available to patients in the interest of transparency. Please be advised this is a medical document. Medical documents are intended to carry relevant information, facts as evident, and the clinical opinion of the practitioner. The medical note is intended as peer to peer communication and may appear blunt or direct. It is written in medical language and may contain abbreviations or verbiage that are unfamiliar.

## (undated) DEVICE — ENDOSCOPY CHANNEL ADAPTER: Brand: ERBE

## (undated) DEVICE — 1200CC GUARDIAN II: Brand: GUARDIAN

## (undated) DEVICE — MEDI-VAC NON-CONDUCTIVE SUCTION TUBING: Brand: CARDINAL HEALTH

## (undated) DEVICE — SYRINGE MED 10ML SLIP TIP CLR BRL TAPR PLUNG

## (undated) DEVICE — MEDI-VAC SUCTION HANDLE REGULAR CAPACITY: Brand: CARDINAL HEALTH

## (undated) DEVICE — GIJAW SINGLE-USE MINI BIOPSY FORCEPS WITHOUT NEEDLE: Brand: GIJAW

## (undated) DEVICE — SINGLE USE SUCTION VALVE MAJ-209: Brand: SINGLE USE SUCTION VALVE (STERILE)

## (undated) DEVICE — MASK,FACE,MAXFLUIDPROTECT,SHIELD/TIES: Brand: MEDLINE

## (undated) DEVICE — Device: Brand: BALLOON

## (undated) DEVICE — 10FT COMBINED O2 DELIVERY/CO2 MONITORING. FILTER WITH MICROSTREAM TYPE LUER: Brand: DUAL ADULT NASAL CANNULA

## (undated) DEVICE — NEEDLE ASPIR 19GA HIST FLX VIZISHOT 2

## (undated) DEVICE — SINGLE USE BIOPSY VALVE MAJ-210: Brand: SINGLE USE BIOPSY VALVE (STERILE)

## (undated) DEVICE — 60 ML SYRINGE REGULAR TIP: Brand: MONOJECT

## (undated) DEVICE — 4-WAY HIGH FLOW STOPCOCK W/ROTATING LUER: Brand: ICU MEDICAL

## (undated) DEVICE — KIT CUSTOM ENDOPROCEDURE STERIS

## (undated) DEVICE — 3M™ RED DOT™ MONITORING ELECTRODE WITH FOAM TAPE AND STICKY GEL, 50/BAG, 20/CASE, 72/PLT 2570: Brand: RED DOT™

## (undated) DEVICE — MAJ-1414 SINGLE USE ADPATER BIOPSY VALV: Brand: SINGLE USE ADAPTOR BIOPSY VALVE

## (undated) DEVICE — BOWL MED MD 16OZ PLAS CAP GRAD

## (undated) NOTE — LETTER
Date: 8/19/2024  Patient name: Yesenia Daniels  YOB: 1975  Medical Record Number: [id number]  Primary Coverage: Payor: [facility] PPO / Plan: [facility] PPO / Product Type: PPO /   Secondary Coverage:   Insurance ID: [id number]  Patient Address: [address] [place] [id number]  Telephone Information:   Home Phone [phone]   Mobile [phone]         Encounter Date: 8/19/2024  Provider: Gerson Christian MD  Diagnosis:     ICD-10-CM   1. Open wound of left breast, subsequent encounter  S21.002D   2. Rash  R21   3. Allergic reaction, initial encounter  T78.40XA   4. Metastasis from breast cancer (HCC)  C79.9    C50.919   5. Sloughing of wound  R23.8   6. Localized edema  R60.0       Progress Note:  [facility] PROGRESS NOTE  GERSON CHRISTIAN MD  8/19/2024    Chief Complaint:   Chief Complaint   Patient presents with    Wound Care     Patient is here for a wound care follow up. She complains of pain that is 10/10. She states that it has gotten progressively worse over the past week.        HPI:   Subjective  Yesenia Daniels is a 49 year old female coming in for a follow-up visit.    HPI    Wound has deteriorated significantly. Increased size - malignant appearing tissue.   She has increased pain which is not well controlled  New rash on back which is v itchy and she thinks is from her chemotherapy.   Would like med for the same.     Review of Systems  Negative except HPI   Denies chest pain / SOB / palpitations  Denies fever.     Allergies  Allergies   Allergen Reactions    Antihistamine & Nasal Deconges [Fexofenadine-Pseudoephedrine] PALPITATIONS    Benadryl [Diphenhydramine] PALPITATIONS and SHORTNESS OF BREATH    Maalox Advanced Max St [Antacid] PALPITATIONS and SHORTNESS OF BREATH    Morphine ANXIETY     Tolerates hydrocodone    Sudafed [Pseudoephedrine] PALPITATIONS and SHORTNESS OF BREATH    Betadine [Povidone Iodine] RASH    Erythromycin NAUSEA AND VOMITING    Latex RASH     Other OTHER (SEE COMMENTS)     Morphine makes her loopy,benedryl as well    Vitamin E RASH       Current Meds:  Current Outpatient Medications   Medication Sig Dispense Refill    Betamethasone Dipropionate Aug (DIPROLENE AF) 0.05 % External Cream Apply 1 Application topically 2 (two) times daily. 50 g 1    sodium hypochlorite 0.125 % External Solution Apply on affected areas daily. 1000 mL 3    tiZANidine 4 MG Oral Tab Take 1 tablet (4 mg total) by mouth 3 (three) times daily as needed (spasms). 20 tablet 0    famotidine 20 MG Oral Tab Take 1 tablet (20 mg total) by mouth 2 (two) times daily.      XARELTO 20 MG Oral Tab Take 1 tablet (20 mg total) by mouth daily.      Ketorolac Tromethamine 10 MG Oral Tab Take 1 tablet (10 mg total) by mouth every 6 (six) hours as needed for Pain. 20 tablet 0    rivaroxaban 15 & 20 MG Oral Tablet Therapy Pack Take As Directed based on package instructions: Days 1-21: 15 mg by mouth twice daily Days 22-30: 20 mg by mouth once daily 1 each 0    oxyCODONE-acetaminophen 5-325 MG Oral Tab Take 1-2 tablets by mouth every 6 (six) hours as needed for Pain. 20 tablet 0    cetirizine 10 MG Oral Tab Take 1 tablet (10 mg total) by mouth daily.      omeprazole 20 MG Oral Capsule Delayed Release Take 1 capsule (20 mg total) by mouth at bedtime.      dilTIAZem HCl ER Coated Beads 240 MG Oral Capsule SR 24 Hr Take 1 capsule (240 mg total) by mouth daily. (Patient taking differently: Take 1 capsule (240 mg total) by mouth at bedtime.) 30 capsule 1         EXAM:   Objective  Objective    Physical Exam    Vital Signs  Vitals:    08/19/24 1403   BP: 104/76   Pulse:    Resp:    Temp:        Wound Assessment  Wound 01/05/23 Incision Chest Right;Upper (Active)       Wound 01/10/23 Incision Chest Right;Upper (Active)       Wound 01/10/23 Chest Right;Upper (Active)       Wound 07/05/24 #1 Left Breast Breast Left;Lower (Active)   Wound Image   08/19/24 1359   Drainage Amount Moderate 08/19/24 1359   Drainage  Description Serous;Yellow 08/19/24 1359   Wound Length (cm) 26 cm 08/19/24 1359   Wound Width (cm) 28.5 cm 08/19/24 1359   Wound Surface Area (cm^2) 741 cm^2 08/19/24 1359   Wound Depth (cm) 0.2 cm 08/19/24 1359   Wound Volume (cm^3) 148.2 cm^3 08/19/24 1359   Wound Healing % -976 08/19/24 1359   Margins Well-defined edges 08/19/24 1359   Non-staged Wound Description Full thickness 08/19/24 1359   Chele-wound Assessment Edema;Red 08/19/24 1359   Wound Granulation Tissue Spongy;Pink 08/19/24 1359   Wound Bed Granulation (%) 75 % 08/19/24 1359   Wound Bed Epithelium (%) 75 % 07/05/24 0959   Wound Bed Slough (%) 25 % 08/19/24 1359   Wound Odor None 08/19/24 1359   Shape Clustered 07/05/24 0959   Tunneling? No 08/19/24 1359   Undermining? No 08/19/24 1359   Sinus Tracts? No 08/19/24 1359           ASSESSMENT AND PLAN:     Assessment    Encounter Diagnosis  1. Open wound of left breast, subsequent encounter    2. Rash    3. Allergic reaction, initial encounter    4. Metastasis from breast cancer (HCC)    5. Sloughing of wound    6. Localized edema      PLAN OF CARE:    Continu dakins / vashe / absorptive dressings  Will order dressings.   Trial of betamethasone cream for allergic rash.   Watch out for signs of early infection - counseled.   Plan of care discussed with patient in detail - All questions answered   Return in 6 week.       Meds & Refills for this Visit:  Requested Prescriptions     Signed Prescriptions Disp Refills    Betamethasone Dipropionate Aug (DIPROLENE AF) 0.05 % External Cream 50 g 1     Sig: Apply 1 Application topically 2 (two) times daily.         Patient Instructions     Wound Cleaning and Dressings:    Wash your hands with soap and water. Always wear gloves while changing dressings. Donot touch wound / chele-wound skin with un-gloved hands. Remove old dressing, discard and place into trash.      DRESSINGS: dakins / VASHE soaked gauze / abd pad OR kerramax OR Eclipse dressing  Change dressing  daily.    Miscellaneous Instructions:  Supplement with a daily multivitamin   Increase protein intake / consider protein supplements - see below    DIETARY MODIFICATIONS TO HELP WITH WOUND HEALING:    Protein: Meats, beans, eggs, milk and yogurt particularly Greek yogurt), tofu, soy nuts, soy protein products    Vitamin C: Citrus fruits and juices, strawberries, tomatoes, tomato juice, peppers, baked potatoes, spinach, broccoli, cauliflower, Davin sprouts, cabbage    Vitamin A: Dark green, leafy vegetables, orange or yellow vegetables, cantaloupe, fortified dairy products, liver, fortified cereals    Zinc: Fortified cereals, red meats, seafood    Consider Johnie by [employer] (These are essential branch chain amino acids that help with tissue building and wound healing) and take 2 packets/day. you can order online at [employer] or [employer]    ADDITIONAL REMINDERS:    The treatment plan has been discussed at length with you and your provider. Follow all instructions carefully, it is very important. If you do not follow all instructions, you are at  risk of your wound not healing, infection, possible loss of limb and even end of life.  Please call the clinic during regular business hours ( 7:30 AM - 5:30 PM) if you notice increased bleeding, redness, warmth, pain or pus like drainage or start running a fever greater than 100.3.    For after hour emergencies, please call your primary physician or go to the nearest emergency room.        Patient/Caregiver Education: There are no barriers to learning. Medical education for above diagnosis given.   Answered all questions.    Outcome: Patient verbalizes understanding. Patient is notified to call with any questions, complications, allergies, or worsening or changing symptoms.  Patient is to call with any side effects or complications as a result of the treatments today.      DOCUMENTATION OF TIME SPENT: Code selection for this visit was based on time spent : 30 min on date of  service in preparing to see the patient, obtaining and/or reviewing separately obtained history, performing a medically appropriate examination, counseling and educating the patient/family/caregiver, ordering medications or testing, referring and communicating with other healthcare providers, documenting clinical information in the E HR, independently interpreting results and communicating results to the patient/family/caregiver and care coordination with the patient's other providers.    Followup: Return in about 6 weeks (around 9/30/2024) for Wound followup.      Note to Patient:  The 21st Century Cures Act makes medical notes like these available to patients in the interest of transparency. However, be advised this is a medical document and is intended as [id number]-lgeo communication; it is written in medical language and may appear blunt, direct, or contain abbreviations or verbiage that are unfamiliar. Medical documents are intended to carry relevant information, facts as evident, and the clinical opinion of the practitioner.    Also, please note that this report has been produced using speech recognition software and may contain errors related to that system including, but not limited to, errors in grammar, punctuation, and spelling, as well as words and phrases that possibly may have been recognized inappropriately.  If there are any questions or concerns, contact the dictating provider for clarification.      Pepper Smith MD  8/19/2024  2:29 PM                   .Weekly Wound Education Note    Teaching Provided To: Patient;Family  Training Topics: Dressing;Discharge instructions;Cleasing and general instructions  Training Method: Explain/Verbal;Written  Training Response: Patient responds and understands            Silvadene cream to open areas, may cover with vaseline gauze, abd pads.  Supplies ordered this visit.    Wound Treatment Orders:  No orders of the defined types were placed in this  encounter.        Wound Information/Order:  Product:Other Eclypse contour 12in x x20in  Dispense: 30 days  Dressing Frequency:Change dressing daily and/or PRN    Was a Debridement performed: Yes, Debridement type: mechanical    Compression Stockings ordered: No    Notes: Other Eclypse contour 12in x x20in

## (undated) NOTE — LETTER
Date: 7/5/2024  Patient name: Yesenia Daniels  YOB: 1975  Medical Record Number: [id number]  Primary Coverage: Payor: [facility] PPO / Plan: BCBS PPO / Product Type: PPO /   Secondary Coverage:   Insurance ID: [id number]  Patient Address: [address] [place] [address]  Telephone Information:   Home Phone [phone]   Mobile [phone]         Encounter Date: 7/5/2024  Provider: Gerson Christian MD  Diagnosis:     ICD-10-CM   1. Open wound of left breast, initial encounter  S21.002A   2. Metastasis from breast cancer (HCC)  C79.9    C50.919   3. Sloughing of wound  R23.8   4. Localized edema  R60.0       Progress Note:  [facility] CONSULTATION NOTE  GERSON CHRISTIAN MD  7/5/2024    Subjective  Yesenia Daniels is a 49 year old female.    Chief Complaint   Patient presents with    Wound Care     Initial visit for left breast wound. Pt first noticed wound draining around may. Has been dressing with bleached gauze and other dressings.      HPI    48 yo CF here for eval and management of open wound left breast - they opened up in April 2024.   She has h/o met. Breast cancer - brain liver lungs etc - has undergone lumpectomy, chemo, XRT. She has had recurrence and hence started on palliative chemotherapy - She is here for wound management of draining left breast ulcers related to her inflammatory breast cancer.   She has  biopsy proven malignancy in the wound bed - per pt.   We are  awaiting oncology treatment records    Significant swelling and  discoloration over left breast with skin break down and slough and bleeding.       Diabetes status: no    Smoker status: no    Past Medical history, Surgical history, Social history, Family history reviewed with patient.   Medications reviewed.   Epic chart notes including provider notes, labs, imaging etc. Reviewed.   [facility] care everywhere queried and results reviewed.     Past Medical Hx:  Past Medical History:    Arrhythmia    Asthma  (HCC)    Back problem    Breast cancer (HCC)    Cancer (HCC)    Shortness of breath    Visual impairment     Past Surgical Hx:  Past Surgical History:   Procedure Laterality Date    Cholecystectomy      Endometrial ablation            x5    Removal gallbladder       Problem List:  Patient Active Problem List   Diagnosis    Atrial fibrillation with rapid ventricular response (HCC)    Epigastric abdominal pain    Hyponatremia    Leukocytosis    Chest pressure    Acute deep vein thrombosis (DVT) of axillary vein of right upper extremity (HCC)    Acute deep vein thrombosis (DVT) of brachial vein of right upper extremity (HCC)     Social History:  Social History     Socioeconomic History    Marital status:    Tobacco Use    Smoking status: Never    Smokeless tobacco: Never   Vaping Use    Vaping status: Never Used   Substance and Sexual Activity    Alcohol use: No     Alcohol/week: 0.0 standard drinks of alcohol    Drug use: No     Social Determinants of Health     Financial Resource Strain: Low Risk  (2022)    [facility]    Overall Financial Resource Strain (CARDIA)     Difficulty of Paying Living Expenses: Not hard at all   Food Insecurity: No Food Insecurity (2022)    [facility]    Hunger Vital Sign     Worried About Running Out of Food in the Last Year: Never true     Ran Out of Food in the Last Year: Never true   Transportation Needs: No Transportation Needs (2022)    [facility]    PRAPARE - Transportation     Lack of Transportation (Medical): No     Lack of Transportation (Non-Medical): No   Physical Activity: Insufficiently Active (2022)    Received from [facility]    Exercise Vital Sign     Days of Exercise per Week: 1 day      Minutes of Exercise per Session: 30 min   Stress: No Stress Concern Present (12/14/2022)    [facility]    [facility] of Occupational Health - Occupational Stress Questionnaire     Feeling of Stress : Not at all    Received from [facility]     Family History:  Family History   Problem Relation Age of Onset    Heart Disorder Father     Cancer Father     Heart Disorder Mother     Heart Disorder Maternal Grandmother     Heart Disorder Maternal Grandfather     Heart Disorder Paternal Grandmother     Heart Disorder Sister      Allergies:  Allergies   Allergen Reactions    Antihistamine & Nasal Deconges [Fexofenadine-Pseudoephedrine] PALPITATIONS    Benadryl [Diphenhydramine] PALPITATIONS and SHORTNESS OF BREATH    Maalox Advanced Max St [Antacid] PALPITATIONS and SHORTNESS OF BREATH    Morphine ANXIETY     Tolerates hydrocodone    Sudafed [Pseudoephedrine] PALPITATIONS and SHORTNESS OF BREATH    Betadine [Povidone Iodine] RASH    Erythromycin NAUSEA AND VOMITING    Latex RASH    Other OTHER (SEE COMMENTS)     Morphine makes her loopy,benedryl as well    Vitamin E RASH     Current Meds:  Current Outpatient Medications   Medication Sig Dispense Refill    sodium hypochlorite 0.125 % External Solution Apply on affected areas daily. 1000 mL 3    tiZANidine 4 MG Oral Tab Take 1 tablet (4 mg total) by mouth 3 (three) times daily as needed (spasms). 20 tablet 0    famotidine 20 MG Oral Tab Take 1 tablet (20 mg total) by mouth 2 (two) times daily.      XARELTO 20 MG Oral Tab Take 1 tablet (20 mg total) by mouth daily.      Ketorolac Tromethamine 10 MG Oral Tab Take 1 tablet (10 mg total) by mouth every 6 (six) hours as needed for Pain. 20 tablet 0    rivaroxaban 15 & 20 MG Oral Tablet Therapy Pack Take As Directed based on package instructions: Days 1-21: 15 mg by mouth twice daily Days 22-30: 20 mg by mouth once daily 1 each 0     oxyCODONE-acetaminophen 5-325 MG Oral Tab Take 1-2 tablets by mouth every 6 (six) hours as needed for Pain. 20 tablet 0    cetirizine 10 MG Oral Tab Take 1 tablet (10 mg total) by mouth daily.      omeprazole 20 MG Oral Capsule Delayed Release Take 1 capsule (20 mg total) by mouth at bedtime.      dilTIAZem HCl ER Coated Beads 240 MG Oral Capsule SR 24 Hr Take 1 capsule (240 mg total) by mouth daily. (Patient taking differently: Take 1 capsule (240 mg total) by mouth at bedtime.) 30 capsule 1     Tobacco Counseling:  Counseling given: Not Answered       REVIEW OF SYSTEMS:   CONSTITUTIONAL:  Denies unusual weight gain/loss, fever, chills, or fatigue.  EENT:  Eyes:  Denies eye pain, visual loss, blurred vision, double vision or yellow sclerae.   CARDIOVASCULAR:  Denies chest pain, chest pressure, chest discomfort, palpitations, dyspnea on exertion or at rest.  RESPIRATORY:  Denies shortness of breath, wheezing, cough or sputum.  GASTROINTESTINAL:  Denies abdominal pain, nausea, vomiting, constipation, diarrhea, or blood in stool.  MUSCULOSKELETAL:  Denies weakness  NEUROLOGICAL:  Denies headache, seizures, dizziness, syncope      Objective  Objective  Physical Exam    Wound Assessment  Wound 01/05/23 Incision Chest Right;Upper (Active)       Wound 01/10/23 Incision Chest Right;Upper (Active)       Wound 01/10/23 Chest Right;Upper (Active)       Wound 07/05/24 #1 Left Breast Breast Left;Lower (Active)   Wound Image   07/05/24 0959   Drainage Amount Scant 07/05/24 0959   Drainage Description Serous;Yellow 07/05/24 0959   Wound Length (cm) 9.5 cm 07/05/24 0959   Wound Width (cm) 14.5 cm 07/05/24 0959   Wound Surface Area (cm^2) 137.75 cm^2 07/05/24 0959   Wound Depth (cm) 0.1 cm 07/05/24 0959   Wound Volume (cm^3) 13.775 cm^3 07/05/24 0959   Margins Well-defined edges 07/05/24 0959   Non-staged Wound Description Full thickness 07/05/24 0959   Patricia-wound Assessment Edema;Red;Dry 07/05/24 0959   Wound Granulation Tissue  Spongy;Pink 07/05/24 0959   Wound Bed Granulation (%) 10 % 07/05/24 0959   Wound Bed Epithelium (%) 75 % 07/05/24 0959   Wound Bed Slough (%) 15 % 07/05/24 0959   Wound Odor None 07/05/24 0959   Shape Clustered 07/05/24 0959          PHYSICAL EXAM:   /63   Pulse 71   Temp 97.5 °F (36.4 °C)   Resp 16   Ht 68\"   Wt 285 lb (129.3 kg)   BMI 43.33 kg/m²  Estimated body mass index is 43.33 kg/m² as calculated from the following:    Height as of this encounter: 68\".    Weight as of this encounter: 285 lb (129.3 kg).   Vital signs reviewed.Appears stated age, well groomed.  Physical Exam:  GEN:  Patient is alert, awake and oriented, well developed, well nourished, no apparent distress.      Assessment  Assessment    Encounter Diagnosis  1. Open wound of left breast, initial encounter    2. Metastasis from breast cancer (HCC)    3. Sloughing of wound    4. Localized edema        Problem List  Patient Active Problem List   Diagnosis    Atrial fibrillation with rapid ventricular response (HCC)    Epigastric abdominal pain    Hyponatremia    Leukocytosis    Chest pressure    Acute deep vein thrombosis (DVT) of axillary vein of right upper extremity (HCC)    Acute deep vein thrombosis (DVT) of brachial vein of right upper extremity (HCC)       Plan    Get records from oncologist.   Start dakins soak   Start absorptive dressings.   W/o s/o infection.   Extensive discussion about plan of care  Return 4 weeks / sooner PRN    PROCEDURES:     Mechanical debridement of wound bed with saline soaked gauze - removed slough - good bleeding response.     Patient Instructions     Wound Cleaning and Dressings:    Wash your hands with soap and water. Always wear gloves while changing dressings. Donot touch wound / chele-wound skin with un-gloved hands. Remove old dressing, discard and place into trash.      DRESSINGS: dakins soaked gauze / abd pad OR kerramax OR Eclipse dressing  Change dressing daily.    Miscellaneous  Instructions:  Supplement with a daily multivitamin   Increase protein intake / consider protein supplements - see below    DIETARY MODIFICATIONS TO HELP WITH WOUND HEALING:    Protein: Meats, beans, eggs, milk and yogurt particularly Greek yogurt), tofu, soy nuts, soy protein products    Vitamin C: Citrus fruits and juices, strawberries, tomatoes, tomato juice, peppers, baked potatoes, spinach, broccoli, cauliflower, [place] sprouts, cabbage    Vitamin A: Dark green, leafy vegetables, orange or yellow vegetables, cantaloupe, fortified dairy products, liver, fortified cereals    Zinc: Fortified cereals, red meats, seafood    Consider Johnie by [employer] (These are essential branch chain amino acids that help with tissue building and wound healing) and take 2 packets/day. you can order online at [employer] or [employer]    ADDITIONAL REMINDERS:    The treatment plan has been discussed at length with you and your provider. Follow all instructions carefully, it is very important. If you do not follow all instructions, you are at  risk of your wound not healing, infection, possible loss of limb and even end of life.  Please call the clinic during regular business hours ( 7:30 AM - 5:30 PM) if you notice increased bleeding, redness, warmth, pain or pus like drainage or start running a fever greater than 100.3.    For after hour emergencies, please call your primary physician or go to the nearest emergency room.        Meds & Refills for this Visit:  Requested Prescriptions     Signed Prescriptions Disp Refills    sodium hypochlorite 0.125 % External Solution 1000 mL 3     Sig: Apply on affected areas daily.         Patient/Caregiver Education: There are no barriers to learning. Medical education for above diagnosis given.   Answered all questions.    Outcome: Patient verbalizes understanding. Patient is notified to call with any questions, complications, allergies, or worsening or changing symptoms.  Patient is to call with  any side effects or complications as a result of the treatments today.      DOCUMENTATION OF TIME SPENT: Code selection for this visit was based on time spent : 60 min on date of service in preparing to see the patient, obtaining and/or reviewing separately obtained history, performing a medically appropriate examination, counseling and educating the patient/family/caregiver, ordering medications or testing, referring and communicating with other healthcare providers, documenting clinical information in the E HR, independently interpreting results and communicating results to the patient/family/caregiver and care coordination with the patient's other providers.    Followup: Return in about 4 weeks (around 8/2/2024) for Wound followup.      Note to Patient:  The 21st Century Cures Act makes medical notes like these available to patients in the interest of transparency. However, be advised this is a medical document and is intended as [id number] communication; it is written in medical language and may appear blunt, direct, or contain abbreviations or verbiage that are unfamiliar. Medical documents are intended to carry relevant information, facts as evident, and the clinical opinion of the practitioner.    Also, please note that this report has been produced using speech recognition software and may contain errors related to that system including, but not limited to, errors in grammar, punctuation, and spelling, as well as words and phrases that possibly may have been recognized inappropriately.  If there are any questions or concerns, contact the dictating provider for clarification.      Pepper Smith MD  7/5/2024  10:21 AM           Weekly Wound Education Note    Teaching Provided To: Patient  Training Topics: Cleasing and general instructions;Discharge instructions;Dressing  Training Method: Explain/Verbal  Training Response: Patient responds and understands;Reinforcement needed        Notes: Initial  visit: to left breast - hx of cancer to area and mets. Daksins solution ordered today. Vashe soaked gauze applied to sloughy/open areas and covered with ABD pads, held on with bra. Supplied ordered from [employer]. Informed patient that maxi pads or baby diapers can also be used as an absorbptive dressing.        Wound Information/Order:  Wound Number: All wounds  Product:4x4 gauze, eclypse super absorbent dressing  Dispense: 30 days  Dressing Frequency:Change dressing twice daily    Was a Debridement performed: Yes, Debridement type: mechanical    Compression Stockings ordered: No    Notes: dispense as written

## (undated) NOTE — ED AVS SNAPSHOT
[facility] in [address] [place] [address] Drive,[address]    [address] [place] [address]    Phone:  [phone]    Fax:  [phone]           Son Szymanski   MRN: [id number]    Department:  THE [facility] in [facility] END   Date of Visit:  3/28/2017 You can get these medications from any pharmacy     Bring a paper prescription for each of these medications    - Cyclobenzaprine HCl 10 MG Tabs              Discharge Instructions       Please return to the ER/clinic if symptoms worsen.  Follow-up with you primary care or a specialist physician for a follow-up visit, please tell this physician (or your personal doctor if your instructions are to return to your personal doctor) about any new or lasting problems.  The primary care or specialist physician will s - If you are a smoker or have smoked in the last 12 months, we encourage you to explore options for quitting.     - If you have concerns related to behavioral health issues or thoughts of harming yourself, contact [facility] a [employer] will allow you to access patient instructions from your recent visit,  view other health information, and more. To sign up or find more information, go to [email or web address] and click on the Sign Up Now link in the [employer] box.      Enter

## (undated) NOTE — ED AVS SNAPSHOT
[facility] in [address] [place] [address],[address]    [facility]    Phone:  [phone]    Fax:  [phone]           Emile Medhat   MRN: [id number]    Department:  [facility] END   Date of Visit:  3/14/2017 gut while the antibiotic fights the bad bacteria that is causing your infection. The good bacteria in your gut act as part of your immune system.   Taking a probiotic while on antibiotics will decrease the chances of upset stomach and diarrhea, which are c this physician (or your personal doctor if your instructions are to return to your personal doctor) about any new or lasting problems. The primary care or specialist physician will see patients referred from the [facility].  Follow-up care is at you to explore options for quitting.     - If you have concerns related to behavioral health issues or thoughts of harming yourself, contact [facility] at [phone].     - If you don’t have insurance, Russ Blandon

## (undated) NOTE — LETTER
[facility] [address], [address]  Consent for Procedure/Sedation                                      Date: 10-2-2020    Time: 1000      1. I authorize the performance upon Deandre Ayala the following:  CARDIOVERSION    2.  I Printed: 10/2/2020   9:57 AM  Patient Name: Deandre Ayala        : 1975       Medical Record #: [id number]

## (undated) NOTE — LETTER
[address]  [address]  Authorization for Surgical Operation and Procedure     Date:___________                                                                                                         Time:__________  I hereby authorize , my physician and his/her assistants (if applicable), which may include medical students, residents, and/or fellows, to perform the following surgical operation/ procedure and administer such anesthesia as may be determined necessary by my physician:  Operation/Procedure name (cardioversion)  on Yesenia Daniels   2.   I recognize that during the surgical operation/procedure, unforeseen conditions may necessitate additional or different procedures than those listed above.  I, therefore, further authorize and request that the above-named surgeon, assistants, or designees perform such procedures as are, in their judgment, necessary and desirable.    3.   My surgeon/physician has discussed prior to my surgery the potential benefits, risks and side effects of this procedure; the likelihood of achieving goals; and potential problems that might occur during recuperation.  They also discussed reasonable alternatives to the procedure, including risks, benefits, and side effects related to the alternatives and risks related to not receiving this procedure.  I have had all my questions answered and I acknowledge that no guarantee has been made as to the result that may be obtained.    4.   Should the need arise during my operation/procedure, which includes change of level of care prior to discharge, I also consent to the administration of blood and/or blood products.  Further, I understand that despite careful testing and screening of blood or blood products by collecting agencies, I may still be subject to ill effects as a result of receiving a blood transfusion and/or blood products.  The following are some, but not all, of the potential risks that  can occur: fever and allergic reactions, hemolytic reactions, transmission of diseases such as Hepatitis, AIDS and Cytomegalovirus (CMV) and fluid overload.  In the event that I wish to have an autologous transfusion of my own blood, or a directed donor transfusion, I will discuss this with my physician.  Check only if Refusing Blood or Blood Products  I understand refusal of blood or blood products as deemed necessary by my physician may have serious consequences to my condition to include possible death. I hereby assume responsibility for my refusal and release the hospital, its personnel, and my physicians from any responsibility for the consequences of my refusal.          o  Refuse      5.   I authorize the use of any specimen, organs, tissues, body parts or foreign objects that may be removed from my body during the operation/procedure for diagnosis, research or teaching purposes and their subsequent disposal by hospital authorities.  I also authorize the release of specimen test results and/or written reports to my treating physician on the hospital medical staff or other referring or consulting physicians involved in my care, at the discretion of the Pathologist or my treating physician.    6.   I consent to the photographing or videotaping of the operations or procedures to be performed, including appropriate portions of my body for medical, scientific, or educational purposes, provided my identity is not revealed by the pictures or by descriptive texts accompanying them.  If the procedure has been photographed/videotaped, the surgeon will obtain the original picture, image, videotape or CD.  The hospital will not be responsible for storage, release or maintenance of the picture, image, tape or CD.    7.   I consent to the presence of a  or observers in the operating room as deemed necessary by my physician or their designees.    8.   I recognize that in the event my procedure results in  extended X-Ray/fluoroscopy time, I may develop a skin reaction.    9. If I have a Do Not Attempt Resuscitation (DNAR) order in place, that status will be suspended while in the operating room, procedural suite, and during the recovery period unless otherwise explicitly stated by me (or a person authorized to consent on my behalf). The surgeon or my attending physician will determine when the applicable recovery period ends for purposes of reinstating the DNAR order.  10. Patients having a sterilization procedure: I understand that if the procedure is successful the results will be permanent and it will therefore be impossible for me to inseminate, conceive, or bear children.  I also understand that the procedure is intended to result in sterility, although the result has not been guaranteed.   11. I acknowledge that my physician has explained sedation/analgesia administration to me including the risk and benefits I consent to the administration of sedation/analgesia as may be necessary or desirable in the judgment of my physician.    I CERTIFY THAT I HAVE READ AND FULLY UNDERSTAND THE ABOVE CONSENT TO OPERATION and/or OTHER PROCEDURE.    _________________________________________  __________________________________  Signature of Patient     Signature of Responsible Person         ___________________________________         Printed Name of Responsible Person           _________________________________                 Relationship to Patient  _________________________________________  ______________________________  Signature of Witness          Date  Time      Patient Name: Yesenia HODGES Jose Alfredo Richey     : 1975                 Printed: 2024     Medical Record #: [id number]                     Page 1 of [address] [place] [address]  [id number]    Consent for Anesthesia    I, Yesenia Daniels agree to be cared for by an anesthesiologist, who is  specially trained to monitor me and give me medicine to put me to sleep or keep me comfortable during my procedure    I understand that my anesthesiologist is not an employee or agent of [facility] or [employer]. He or she works for [employer].    As the patient asking for anesthesia services, I agree to:  Allow the anesthesiologist (anesthesia doctor) to give me medicine and do additional procedures as necessary. Some examples are: Starting or using an “IV” to give me medicine, fluids or blood during my procedure, and having a breathing tube placed to help me breathe when I’m asleep (intubation). In the event that my heart stops working properly, I understand that my anesthesiologist will make every effort to sustain my life, unless otherwise directed by [facility] Do Not Resuscitate documents.  Tell my anesthesia doctor before my procedure:  If I am pregnant.  The last time that I ate or drank.  All of the medicines I take (including prescriptions, herbal supplements, and pills I can buy without a prescription (including street drugs/illegal medications). Failure to inform my anesthesiologist about these medicines may increase my risk of anesthetic complications.  If I am allergic to anything or have had a reaction to anesthesia before.  I understand how the anesthesia medicine will help me (benefits).  I understand that with any type of anesthesia medicine there are risks:  The most common risks are: nausea, vomiting, sore throat, muscle soreness, damage to my eyes, mouth, or teeth (from breathing tube placement).  Rare risks include: remembering what happened during my procedure, allergic reactions to medications, injury to my airway, heart, lungs, vision, nerves, or muscles and in extremely rare instances death.  My doctor has explained to me other choices available to me for my care (alternatives).  Pregnant Patients (“epidural”):  I understand that the risks of having  an epidural (medicine given into my back to help control pain during labor), include itching, low blood pressure, difficulty urinating, headache or slowing of the baby’s heart. Very rare risks include infection, bleeding, seizure, irregular heart rhythms and nerve injury.  Regional Anesthesia (“spinal”, “epidural”, & “nerve blocks”):  I understand that rare but potential complications include headache, bleeding, infection, seizure, irregular heart rhythms, and nerve injury.    I can change my mind about having anesthesia services at any time before I get the medicine.    _____________________________________________________________________________  Patient (or Representative) Signature/Relationship to Patient  Date   Time    _____________________________________________________________________________   Name (if used)    Language/Organization   Time    _____________________________________________________________________________  Anesthesiologist Signature     Date   Time  I have discussed the procedure and information above with the patient (or patient’s representative) and answered their questions. The patient or their representative has agreed to have anesthesia services.    _____________________________________________________________________________  Witness        Date   Time  I have verified that the signature is that of the patient or patient’s representative, and that it was signed before the procedure  Patient Name: Yesenia HODGES Jose Alfredo Richey     : 1975                 Printed: 2024     Medical Record #: [id number]                     Page 2 of 2

## (undated) NOTE — ED AVS SNAPSHOT
Kenroy Joaquin   MRN: [id number]    Department:  [facility] Emergency Department   Date of Visit:  9/26/2019           Disclosure     Insurance plans vary and the physician(s) referred by the ER may not be covered by your plan.  Please contact y tell this physician (or your personal doctor if your instructions are to return to your personal doctor) about any new or lasting problems. The primary care or specialist physician will see patients referred from the [facility] Emergency Department.  Pj Lantigua

## (undated) NOTE — LETTER
[address]  [address]  Authorization for Surgical Operation and Procedure     Date:___________                                                                                                         Time:__________  I hereby authorize Sixto Lockhart, my physician and his/her assistants (if applicable), which may include medical students, residents, and/or fellows, to perform the following surgical operation/ procedure and administer such anesthesia as may be determined necessary by my physician:  Operation/Procedure name (bronchoscopy with endobronchial ultrasound guided transbronchial needle aspirations of lymphadenopathy, possible endobronchial biopsies, possible LASER, possible cautery )  on Yesenia HODGES Van Ilya AA   2.   I recognize that during the surgical operation/procedure, unforeseen conditions may necessitate additional or different procedures than those listed above.  I, therefore, further authorize and request that the above-named surgeon, assistants, or designees perform such procedures as are, in their judgment, necessary and desirable.    3.   My surgeon/physician has discussed prior to my surgery the potential benefits, risks and side effects of this procedure; the likelihood of achieving goals; and potential problems that might occur during recuperation.  They also discussed reasonable alternatives to the procedure, including risks, benefits, and side effects related to the alternatives and risks related to not receiving this procedure.  I have had all my questions answered and I acknowledge that no guarantee has been made as to the result that may be obtained.    4.   Should the need arise during my operation/procedure, which includes change of level of care prior to discharge, I also consent to the administration of blood and/or blood products.  Further, I understand that despite careful testing and screening of blood or blood products by collecting agencies, I  may still be subject to ill effects as a result of receiving a blood transfusion and/or blood products.  The following are some, but not all, of the potential risks that can occur: fever and allergic reactions, hemolytic reactions, transmission of diseases such as Hepatitis, AIDS and Cytomegalovirus (CMV) and fluid overload.  In the event that I wish to have an autologous transfusion of my own blood, or a directed donor transfusion, I will discuss this with my physician.  Check only if Refusing Blood or Blood Products  I understand refusal of blood or blood products as deemed necessary by my physician may have serious consequences to my condition to include possible death. I hereby assume responsibility for my refusal and release the hospital, its personnel, and my physicians from any responsibility for the consequences of my refusal.          o  Refuse      5.   I authorize the use of any specimen, organs, tissues, body parts or foreign objects that may be removed from my body during the operation/procedure for diagnosis, research or teaching purposes and their subsequent disposal by hospital authorities.  I also authorize the release of specimen test results and/or written reports to my treating physician on the hospital medical staff or other referring or consulting physicians involved in my care, at the discretion of the Pathologist or my treating physician.    6.   I consent to the photographing or videotaping of the operations or procedures to be performed, including appropriate portions of my body for medical, scientific, or educational purposes, provided my identity is not revealed by the pictures or by descriptive texts accompanying them.  If the procedure has been photographed/videotaped, the surgeon will obtain the original picture, image, videotape or CD.  The hospital will not be responsible for storage, release or maintenance of the picture, image, tape or CD.    7.   I consent to the presence of a   or observers in the operating room as deemed necessary by my physician or their designees.    8.   I recognize that in the event my procedure results in extended X-Ray/fluoroscopy time, I may develop a skin reaction.    9. If I have a Do Not Attempt Resuscitation (DNAR) order in place, that status will be suspended while in the operating room, procedural suite, and during the recovery period unless otherwise explicitly stated by me (or a person authorized to consent on my behalf). The surgeon or my attending physician will determine when the applicable recovery period ends for purposes of reinstating the DNAR order.  10. Patients having a sterilization procedure: I understand that if the procedure is successful the results will be permanent and it will therefore be impossible for me to inseminate, conceive, or bear children.  I also understand that the procedure is intended to result in sterility, although the result has not been guaranteed.   11. I acknowledge that my physician has explained sedation/analgesia administration to me including the risk and benefits I consent to the administration of sedation/analgesia as may be necessary or desirable in the judgment of my physician.    I CERTIFY THAT I HAVE READ AND FULLY UNDERSTAND THE ABOVE CONSENT TO OPERATION and/or OTHER PROCEDURE.    _________________________________________  __________________________________  Signature of Patient     Signature of Responsible Person         ___________________________________         Printed Name of Responsible Person           _________________________________                 Relationship to Patient  _________________________________________  ______________________________  Signature of Witness          Date  Time      Patient Name: Yesenia TRACIE Daniels     : 1975                 Printed: September 3, 2024     Medical Record #: [id number]                     Page 1 of 2                                     [address]  [address]    Consent for Anesthesia    I Yesenia HODGES Jose Alfredo Richey agree to be cared for by an anesthesiologist, who is specially trained to monitor me and give me medicine to put me to sleep or keep me comfortable during my procedure    I understand that my anesthesiologist is not an employee or agent of [facility] or [email or web address]. He or she works for [employer].    As the patient asking for anesthesia services, I agree to:  Allow the anesthesiologist (anesthesia doctor) to give me medicine and do additional procedures as necessary. Some examples are: Starting or using an “IV” to give me medicine, fluids or blood during my procedure, and having a breathing tube placed to help me breathe when I’m asleep (intubation). In the event that my heart stops working properly, I understand that my anesthesiologist will make every effort to sustain my life, unless otherwise directed by [facility] Do Not Resuscitate documents.  Tell my anesthesia doctor before my procedure:  If I am pregnant.  The last time that I ate or drank.  All of the medicines I take (including prescriptions, herbal supplements, and pills I can buy without a prescription (including street drugs/illegal medications). Failure to inform my anesthesiologist about these medicines may increase my risk of anesthetic complications.  If I am allergic to anything or have had a reaction to anesthesia before.  I understand how the anesthesia medicine will help me (benefits).  I understand that with any type of anesthesia medicine there are risks:  The most common risks are: nausea, vomiting, sore throat, muscle soreness, damage to my eyes, mouth, or teeth (from breathing tube placement).  Rare risks include: remembering what happened during my procedure, allergic reactions to medications, injury to my airway, heart, lungs, vision, nerves, or muscles and in extremely rare  instances death.  My doctor has explained to me other choices available to me for my care (alternatives).  Pregnant Patients (“epidural”):  I understand that the risks of having an epidural (medicine given into my back to help control pain during labor), include itching, low blood pressure, difficulty urinating, headache or slowing of the baby’s heart. Very rare risks include infection, bleeding, seizure, irregular heart rhythms and nerve injury.  Regional Anesthesia (“spinal”, “epidural”, & “nerve blocks”):  I understand that rare but potential complications include headache, bleeding, infection, seizure, irregular heart rhythms, and nerve injury.    I can change my mind about having anesthesia services at any time before I get the medicine.    _____________________________________________________________________________  Patient (or Representative) Signature/Relationship to Patient  Date   Time    _____________________________________________________________________________   Name (if used)    Language/Organization   Time    _____________________________________________________________________________  Anesthesiologist Signature     Date   Time  I have discussed the procedure and information above with the patient (or patient’s representative) and answered their questions. The patient or their representative has agreed to have anesthesia services.    _____________________________________________________________________________  Witness        Date   Time  I have verified that the signature is that of the patient or patient’s representative, and that it was signed before the procedure  Patient Name: Yesenia HODGES Jose Alfredo Richey     : 1975                 Printed: September 3, 2024     Medical Record #: [id number]                     Page 2 of 2